# Patient Record
Sex: MALE | Race: WHITE | HISPANIC OR LATINO | Employment: OTHER | URBAN - NONMETROPOLITAN AREA
[De-identification: names, ages, dates, MRNs, and addresses within clinical notes are randomized per-mention and may not be internally consistent; named-entity substitution may affect disease eponyms.]

---

## 2017-03-09 PROBLEM — M19.90 DJD (DEGENERATIVE JOINT DISEASE): Status: ACTIVE | Noted: 2017-03-09

## 2017-03-09 PROBLEM — G47.33 OSA (OBSTRUCTIVE SLEEP APNEA): Status: ACTIVE | Noted: 2017-03-09

## 2017-03-09 PROBLEM — F32.A ANXIETY AND DEPRESSION: Status: ACTIVE | Noted: 2017-03-09

## 2017-03-09 PROBLEM — G47.00 INSOMNIA: Status: ACTIVE | Noted: 2017-03-09

## 2017-03-09 PROBLEM — E78.5 DYSLIPIDEMIA: Status: ACTIVE | Noted: 2017-03-09

## 2017-03-09 PROBLEM — K51.90 ULCERATIVE COLITIS (HCC): Status: ACTIVE | Noted: 2017-03-09

## 2017-03-09 PROBLEM — I25.10 CAD (CORONARY ARTERY DISEASE): Status: ACTIVE | Noted: 2017-03-09

## 2017-03-09 PROBLEM — F41.9 ANXIETY AND DEPRESSION: Status: ACTIVE | Noted: 2017-03-09

## 2017-03-09 PROBLEM — I10 HYPERTENSION: Status: ACTIVE | Noted: 2017-03-09

## 2017-03-09 PROBLEM — R41.3 POOR SHORT TERM MEMORY: Status: ACTIVE | Noted: 2017-03-09

## 2017-03-09 PROBLEM — N40.0 BPH (BENIGN PROSTATIC HYPERPLASIA): Status: ACTIVE | Noted: 2017-03-09

## 2017-10-04 ENCOUNTER — OFFICE VISIT (OUTPATIENT)
Dept: CARDIOLOGY | Facility: CLINIC | Age: 72
End: 2017-10-04

## 2017-10-04 VITALS
HEART RATE: 58 BPM | OXYGEN SATURATION: 100 % | WEIGHT: 195.4 LBS | HEIGHT: 66 IN | DIASTOLIC BLOOD PRESSURE: 69 MMHG | BODY MASS INDEX: 31.4 KG/M2 | SYSTOLIC BLOOD PRESSURE: 125 MMHG

## 2017-10-04 DIAGNOSIS — I25.10 CORONARY ARTERY DISEASE DUE TO CALCIFIED CORONARY LESION: Primary | ICD-10-CM

## 2017-10-04 DIAGNOSIS — R06.02 SHORTNESS OF BREATH: ICD-10-CM

## 2017-10-04 DIAGNOSIS — I25.84 CORONARY ARTERY DISEASE DUE TO CALCIFIED CORONARY LESION: Primary | ICD-10-CM

## 2017-10-04 DIAGNOSIS — E78.5 DYSLIPIDEMIA: ICD-10-CM

## 2017-10-04 DIAGNOSIS — G47.33 OSA (OBSTRUCTIVE SLEEP APNEA): ICD-10-CM

## 2017-10-04 DIAGNOSIS — I10 ESSENTIAL HYPERTENSION: ICD-10-CM

## 2017-10-04 DIAGNOSIS — R41.3 POOR SHORT TERM MEMORY: ICD-10-CM

## 2017-10-04 PROCEDURE — 99214 OFFICE O/P EST MOD 30 MIN: CPT | Performed by: INTERNAL MEDICINE

## 2017-10-04 PROCEDURE — 93000 ELECTROCARDIOGRAM COMPLETE: CPT | Performed by: INTERNAL MEDICINE

## 2017-10-04 RX ORDER — MECLIZINE HYDROCHLORIDE 25 MG/1
25 TABLET ORAL
COMMUNITY
Start: 2017-07-06 | End: 2020-01-28

## 2017-10-04 RX ORDER — ZOLPIDEM TARTRATE 10 MG/1
10 TABLET ORAL NIGHTLY PRN
COMMUNITY
End: 2023-02-07

## 2017-10-04 RX ORDER — METOPROLOL TARTRATE 50 MG/1
TABLET, FILM COATED ORAL NIGHTLY
COMMUNITY
Start: 2017-08-07 | End: 2017-10-04 | Stop reason: DRUGHIGH

## 2017-10-04 RX ORDER — ALPRAZOLAM 1 MG/1
1 TABLET ORAL NIGHTLY
COMMUNITY
Start: 2017-09-05 | End: 2023-02-07

## 2017-10-04 RX ORDER — LISINOPRIL 5 MG/1
10 TABLET ORAL DAILY
COMMUNITY
Start: 2017-09-24 | End: 2020-01-28 | Stop reason: DRUGHIGH

## 2017-10-04 RX ORDER — CLONIDINE HYDROCHLORIDE 0.2 MG/1
0.2 TABLET ORAL NIGHTLY
COMMUNITY
Start: 2017-09-22 | End: 2020-01-28

## 2017-10-04 RX ORDER — ASPIRIN 325 MG
325 TABLET ORAL DAILY
COMMUNITY
End: 2023-02-07 | Stop reason: DRUGHIGH

## 2017-10-04 NOTE — PROGRESS NOTES
Subjective   Keyon Olivas is a 72 y.o. male     Chief Complaint   Patient presents with   • Establish Care   • Coronary Artery Disease   • Hypertension   • Sleep Apnea   • Hyperlipidemia       PROBLEM LIST:     1. Coronary artery disease   1.1 CABG x 3, 05/11/2000;  LIMA to LAD.  Left radial artery to RCA.  Left radial artery as T graft to LIMA/OM2  1.2 cath august 2006, stenting to RCA and OM2  2. Hypertension   3. Dyslipidemia   3.1 intolerant to Crestor 2012, and Lipitor   4. H/o hep. C; followed by Dr. Cash   5. Dyspnea on Exertion     Specialty Problems        Cardiology Problems    CAD (coronary artery disease)        Hypertension                HPI:  Mr. Keyon Olivas is a 72-year-old white male referred from Dr. Isac Olivas's office for assumption of cardiac care.    Mr. Olivas has known coronary artery disease and underwent coronary artery bypass grafting ×3 in the year 2000 after presenting with acute coronary syndrome.  Apparently internal mammary artery and left radial artery were used as conduits.  Several years after bypass grafting Mr. Olivas developed progressive exertional dyspnea.  Apparently this resulted in repeat stress testing and subsequently PTCA.  Several years later, again per patient report, exertional dyspnea resulted in a second round of stenting.    Mr. Olivas was lost to follow-up shortly thereafter.  However, he was seen by Dr. Hylton in 2016.  Apparently, the patient had stress testing which demonstrated no evidence of ischemia and no further evaluation was performed.    Currently, Mr. Olivas describes severe exertional dyspnea.  He states that he tries to take in his garden or plant even a small plant he will have to spend the entire day working and resting to accomplish.  He has no exertional chest discomfort.  He relates no orthopnea or PND.  He has stable lower extremity edema.  His current symptoms mimic those prior to stenting.  Notably, symptoms improved after each percutaneous  intervention.  Mr. Olivas has chronic intermittent vertigo which she relates to benign positional vertigo.  He also has mild instability.  However, he denies claudication, palpitations, presyncope, or syncope.          CURRENT MEDICATION:    Current Outpatient Prescriptions   Medication Sig Dispense Refill   • ALPRAZolam (XANAX) 1 MG tablet Every Night.     • aspirin 325 MG tablet Take 325 mg by mouth Daily.     • CloNIDine (CATAPRES) 0.2 MG tablet Every Night.     • lisinopril (PRINIVIL,ZESTRIL) 5 MG tablet Daily.     • meclizine (ANTIVERT) 25 MG tablet prn     • zolpidem (AMBIEN) 10 MG tablet Take 10 mg by mouth At Night As Needed for Sleep.     • Evolocumab with Infusor 420 MG/3.5ML solution cartridge Inject 1 Cartridge under the skin Every 30 (Thirty) Days. 1 cartridge. 11   • metoprolol tartrate (LOPRESSOR) 25 MG tablet Take 1 tablet by mouth 2 (Two) Times a Day. 60 tablet 11     No current facility-administered medications for this visit.        ALLERGIES:    Crestor [rosuvastatin]; Lescol [fluvastatin sodium]; Lipitor [atorvastatin]; Penicillins; and Niacin and related    PAST MEDICAL HISTORY:    Past Medical History:   Diagnosis Date   • Anxiety and depression 3/9/2017   • BPH (benign prostatic hyperplasia) 3/9/2017   • CAD (coronary artery disease) 3/9/2017   • DJD (degenerative joint disease) 3/9/2017   • Dyslipidemia 3/9/2017   • Hepatitis C     History of hepatitis C; followed by Dr. Cash in [place].   • Hypertension 3/9/2017   • Insomnia 3/9/2017   • DEACON (obstructive sleep apnea) 3/9/2017   • Poor short term memory 3/9/2017   • Ulcerative colitis 3/9/2017       SURGICAL HISTORY:    Past Surgical History:   Procedure Laterality Date   • CARDIAC CATHETERIZATION  05/2000   • COLECTOMY TOTAL      Ulcerative colitis, status post total colectomy with ileostomy.    • CORONARY ARTERY BYPASS GRAFT  05/11/2000    x3   • CORONARY STENT PLACEMENT  08/2006       SOCIAL HISTORY:    Social History     Social History  "  • Marital status:      Spouse name: N/A   • Number of children: N/A   • Years of education: N/A     Occupational History   • Not on file.     Social History Main Topics   • Smoking status: Former Smoker     Types: Cigarettes     Quit date: 1/1/1983   • Smokeless tobacco: Former User     Types: Chew      Comment: smoked 10-20 years, < last 1 PPD   • Alcohol use No   • Drug use: No   • Sexual activity: Not on file     Other Topics Concern   • Not on file     Social History Narrative       FAMILY HISTORY:    Family History   Problem Relation Age of Onset   • Emphysema Mother    • Heart failure Mother    • Heart attack Father    • Memory loss Brother        Review of Systems   Constitutional: Positive for fatigue. Negative for chills, diaphoresis and fever.   HENT: Positive for voice change.    Eyes: Positive for visual disturbance (reading glasses).   Respiratory: Positive for shortness of breath. Negative for cough, choking, chest tightness, wheezing and stridor.    Cardiovascular: Negative.    Gastrointestinal: Negative.  Negative for abdominal pain, blood in stool, constipation, diarrhea, nausea and vomiting.   Endocrine: Negative.  Negative for cold intolerance and heat intolerance.   Genitourinary: Negative.    Musculoskeletal: Positive for arthralgias and myalgias.   Skin: Negative.  Negative for color change, pallor, rash and wound.   Allergic/Immunologic: Positive for environmental allergies.   Neurological: Positive for dizziness (vertigo). Negative for tremors, seizures, syncope, facial asymmetry, speech difficulty, weakness, light-headedness, numbness and headaches.        HX vertigo   Hematological: Bruises/bleeds easily.   Psychiatric/Behavioral: Positive for sleep disturbance.       VISIT VITALS:    /69 (BP Location: Left arm, Patient Position: Sitting)  Pulse 58  Ht 66\" (167.6 cm)  Wt 195 lb 6.4 oz (88.6 kg)  SpO2 100%  BMI 31.54 kg/m2    RECENT LABS:    Objective       Physical Exam "   Constitutional: He is oriented to person, place, and time. He appears well-developed and well-nourished. No distress.   HENT:   Head: Normocephalic and atraumatic.   Mouth/Throat: Uvula is midline. No oral lesions.   Eyes: Conjunctivae, EOM and lids are normal. Pupils are equal, round, and reactive to light. Lids are everted and swept, no foreign bodies found.   Negative ptosis   Negative lid lag   Negative arcus senilis     Neck: Normal range of motion. Neck supple. Normal carotid pulses, no hepatojugular reflux and no JVD present. Carotid bruit is not present. No tracheal deviation present. No thyroid mass and no thyromegaly present.   Cardiovascular: Normal rate, regular rhythm, S1 normal, S2 normal, normal heart sounds and intact distal pulses.  Exam reveals no gallop, no S3, no S4, no distant heart sounds and no friction rub.    No murmur heard.  Pulses:       Radial pulses are 2+ on the right side, and 2+ on the left side.        Dorsalis pedis pulses are 2+ on the right side, and 2+ on the left side.        Posterior tibial pulses are 2+ on the right side, and 2+ on the left side.   Pulmonary/Chest: Effort normal and breath sounds normal. No respiratory distress. He has no decreased breath sounds. He has no wheezes. He has no rhonchi. He has no rales. He exhibits no tenderness.   Abdominal: Soft. Bowel sounds are normal. He exhibits no distension, no abdominal bruit and no mass. There is no tenderness. There is no rebound and no guarding.   Negative organomegaly      Musculoskeletal: Normal range of motion. He exhibits edema (trace edema BLE). He exhibits no tenderness or deformity.   Lymphadenopathy:     He has no cervical adenopathy.     He has no axillary adenopathy.   Neurological: He is alert and oriented to person, place, and time.   Skin: Skin is warm and dry. No rash noted. No erythema. No pallor.   Psychiatric: He has a normal mood and affect. His speech is normal and behavior is normal. Judgment  and thought content normal. Cognition and memory are normal.   Nursing note and vitals reviewed.        ECG 12 Lead  Date/Time: 10/4/2017 2:51 PM  Performed by: SONIYA GRACE  Authorized by: SONIYA GRACE   Rhythm: sinus rhythm  Rate: bradycardic  QRS axis: normal  Clinical impression: abnormal ECG              Assessment/Plan    #1.  Mr. Olivas has known coronary artery disease and has had progressive and severe exertional dyspnea (no pericardial association class 3-4 symptomatology).  His current symptoms are similar to those which prompted stenting on 2 separate occasions in the past.  Therefore, I think that further risk stratification is indicated.    #2.  We will perform pharmacologic stress testing as an ischemia assessment as I don't believe the patient would be able to walk adequately on a treadmill.    #3.  I would like to obtain an echocardiogram to assess LV systolic and diastolic, LV filling pressures, pulmonary pressures, and a look for restrictive or constrictive hemodynamics as the patient states that shortness of breath initially began approximately one year after bypass.    #4.  The patient is significantly bradycardic today.  He takes metoprolol tartrate 50 mg in the morning.  He complains of labile hypertension.  Therefore, I would like to change metoprolol dosing to 25 mg twice daily and follow heart rate and blood pressure closely.  We may need to consider event monitoring at sometime in the future to ensure that chronotropic incompetence is not a contributing factor to the patient's poor exercise capacity.    #5.  Mr. Olivas is had significant side effects from multiple statin medications.  In the setting of known coronary artery disease I believe he is a candidate for PCSK-9 inhibitor therapy.  We will begin the same.    #6.  Mr. Olivas will follow-up with Dr. Olivas as instructed, and in our office after testing or on a when necessary basis for symptoms as discussed today.                Diagnosis Plan   1. Coronary artery disease due to calcified coronary lesion  ECG 12 Lead    Evolocumab with Infusor 420 MG/3.5ML solution cartridge    Lipid Panel    Comprehensive Metabolic Panel    Stress Test With Myocardial Perfusion One Day    Adult Transthoracic Echo Complete W/ Cont if Necessary Per Protocol    Lipid Panel    Comprehensive Metabolic Panel    Stress Test With Myocardial Perfusion One Day    Adult Transthoracic Echo Complete W/ Cont if Necessary Per Protocol   2. Essential hypertension  ECG 12 Lead    Lipid Panel    Comprehensive Metabolic Panel    Stress Test With Myocardial Perfusion One Day    Adult Transthoracic Echo Complete W/ Cont if Necessary Per Protocol    Lipid Panel    Comprehensive Metabolic Panel    Stress Test With Myocardial Perfusion One Day    Adult Transthoracic Echo Complete W/ Cont if Necessary Per Protocol   3. Shortness of breath  ECG 12 Lead    Lipid Panel    Comprehensive Metabolic Panel    Stress Test With Myocardial Perfusion One Day    Adult Transthoracic Echo Complete W/ Cont if Necessary Per Protocol    Lipid Panel    Comprehensive Metabolic Panel    Stress Test With Myocardial Perfusion One Day    Adult Transthoracic Echo Complete W/ Cont if Necessary Per Protocol   4. Poor short term memory     5. Dyslipidemia  Evolocumab with Infusor 420 MG/3.5ML solution cartridge    Lipid Panel    Comprehensive Metabolic Panel    Stress Test With Myocardial Perfusion One Day    Adult Transthoracic Echo Complete W/ Cont if Necessary Per Protocol    Lipid Panel    Comprehensive Metabolic Panel    Stress Test With Myocardial Perfusion One Day    Adult Transthoracic Echo Complete W/ Cont if Necessary Per Protocol   6. DEACON (obstructive sleep apnea)         Return for f/u after testing .         Juan C Samaniego MD   Scribed for Dr. Juan C Samaniego by DAXA Lou October 4, 2017 4:18 PM

## 2017-11-01 ENCOUNTER — HOSPITAL ENCOUNTER (OUTPATIENT)
Dept: CARDIOLOGY | Facility: HOSPITAL | Age: 72
Discharge: HOME | End: 2017-11-01
Attending: INTERNAL MEDICINE

## 2017-11-01 ENCOUNTER — OUTSIDE FACILITY SERVICE (OUTPATIENT)
Dept: CARDIOLOGY | Facility: CLINIC | Age: 72
End: 2017-11-01

## 2017-11-01 LAB
MAXIMAL PREDICTED HEART RATE: 150 BPM
MAXIMAL PREDICTED HEART RATE: 150 BPM
STRESS TARGET HR: 128 BPM
STRESS TARGET HR: 128 BPM

## 2017-11-01 PROCEDURE — A9500 TC99M SESTAMIBI: HCPCS | Performed by: INTERNAL MEDICINE

## 2017-11-01 PROCEDURE — 25010000002 REGADENOSON 0.4 MG/5ML SOLUTION: Performed by: INTERNAL MEDICINE

## 2017-11-01 PROCEDURE — 78452 HT MUSCLE IMAGE SPECT MULT: CPT

## 2017-11-01 PROCEDURE — 93018 CV STRESS TEST I&R ONLY: CPT | Performed by: INTERNAL MEDICINE

## 2017-11-01 PROCEDURE — 93306 TTE W/DOPPLER COMPLETE: CPT | Performed by: INTERNAL MEDICINE

## 2017-11-01 PROCEDURE — 0 TECHNETIUM SESTAMIBI: Performed by: INTERNAL MEDICINE

## 2017-11-01 PROCEDURE — 93306 TTE W/DOPPLER COMPLETE: CPT

## 2017-11-01 PROCEDURE — 93017 CV STRESS TEST TRACING ONLY: CPT

## 2017-11-01 PROCEDURE — 78452 HT MUSCLE IMAGE SPECT MULT: CPT | Performed by: INTERNAL MEDICINE

## 2017-11-01 RX ADMIN — TECHNETIUM TC-99M SESTAMIBI 1 DOSE: 1 INJECTION INTRAVENOUS at 13:15

## 2017-11-01 RX ADMIN — REGADENOSON 0.4 MG: 0.08 INJECTION, SOLUTION INTRAVENOUS at 13:15

## 2017-11-08 ENCOUNTER — TELEPHONE (OUTPATIENT)
Dept: CARDIOLOGY | Facility: CLINIC | Age: 72
End: 2017-11-08

## 2017-11-08 NOTE — TELEPHONE ENCOUNTER
PATIENT AWARE OF ECHO AND STRESS TEST RESULTS.  MEI PERDOMO SCHEDULING FOLLOW UP APPT. CHACHA,MICHELLE

## 2017-11-08 NOTE — TELEPHONE ENCOUNTER
----- Message from Jose Montano sent at 11/8/2017  9:49 AM EST -----  Contact: DEVORA LOZOYA RETURNED YOUR CALL FOR HIS TEST RESULTS.

## 2018-01-08 ENCOUNTER — OFFICE VISIT (OUTPATIENT)
Dept: CARDIOLOGY | Facility: CLINIC | Age: 73
End: 2018-01-08

## 2018-01-08 VITALS
WEIGHT: 192.2 LBS | HEIGHT: 66 IN | SYSTOLIC BLOOD PRESSURE: 134 MMHG | BODY MASS INDEX: 30.89 KG/M2 | DIASTOLIC BLOOD PRESSURE: 71 MMHG | OXYGEN SATURATION: 98 % | HEART RATE: 65 BPM

## 2018-01-08 DIAGNOSIS — G47.33 OSA (OBSTRUCTIVE SLEEP APNEA): ICD-10-CM

## 2018-01-08 DIAGNOSIS — E78.5 DYSLIPIDEMIA: ICD-10-CM

## 2018-01-08 DIAGNOSIS — I10 ESSENTIAL HYPERTENSION: ICD-10-CM

## 2018-01-08 DIAGNOSIS — I25.810 CORONARY ARTERY DISEASE INVOLVING CORONARY BYPASS GRAFT OF NATIVE HEART WITHOUT ANGINA PECTORIS: Primary | ICD-10-CM

## 2018-01-08 PROCEDURE — 99213 OFFICE O/P EST LOW 20 MIN: CPT | Performed by: NURSE PRACTITIONER

## 2018-01-08 RX ORDER — NITROGLYCERIN 0.4 MG/1
0.4 TABLET SUBLINGUAL
COMMUNITY
End: 2018-01-08 | Stop reason: SDUPTHER

## 2018-01-08 RX ORDER — NITROGLYCERIN 0.4 MG/1
0.4 TABLET SUBLINGUAL
Qty: 35 TABLET | Refills: 5 | Status: SHIPPED | OUTPATIENT
Start: 2018-01-08 | End: 2020-01-28 | Stop reason: SDUPTHER

## 2018-01-08 NOTE — PROGRESS NOTES
Subjective   Keyon Olivas is a 72 y.o. male     Chief Complaint   Patient presents with   • Coronary Artery Disease     presents as a follow up   • Results     presents for testing results        HPI    PROBLEM LIST:     1. Coronary artery disease   1.1 CABG x 3, 05/11/2000;  LIMA to LAD.  Left radial artery to RCA.  Left radial artery as T graft to LIMA/OM2  1.2 cath august 2006, stenting to RCA and OM2  1.3 Stress test 11/1/17-no ischemia, preserved LVEF  2. Hypertension   3. Dyslipidemia   3.1 intolerant to Crestor 2012, and Lipitor   4. H/o hep. C; followed by Dr. Cash   5. Dyspnea on Exertion   5.1 Echo 11/1/17-EF greater than 65%, diastolic dysfunction 1, mild MR, mild TR, IL, PA 30-35    Patient is a 72-year-old male who presents today for follow-up on testing.  He denies any chest pain, pressure, palpitations, fluttering, presyncope, syncope, orthopnea, PND or edema.  He denies any shortness of breath with activity.  He says he does have a history of vertigo has not had episode in a while.  He says he has for a few weeks now had numbness tingling in his arms and his legs.  He will follow-up with PCP regarding this.  PCP monitors his cholesterol.    Current Outpatient Prescriptions   Medication Sig Dispense Refill   • ALPRAZolam (XANAX) 1 MG tablet Every Night.     • aspirin 325 MG tablet Take 325 mg by mouth Daily.     • CloNIDine (CATAPRES) 0.2 MG tablet Every Night.     • Evolocumab with Infusor 420 MG/3.5ML solution cartridge Inject 1 Cartridge under the skin Every 30 (Thirty) Days. 1 cartridge. 11   • meclizine (ANTIVERT) 25 MG tablet prn     • metoprolol tartrate (LOPRESSOR) 25 MG tablet Take 1 tablet by mouth 2 (Two) Times a Day. (Patient taking differently: Take 25 mg by mouth Daily.) 60 tablet 11   • nitroglycerin (NITROSTAT) 0.4 MG SL tablet Place 1 tablet under the tongue Every 5 (Five) Minutes As Needed for Chest Pain. Take no more than 3 doses in 15 minutes. 35 tablet 5   • zolpidem (AMBIEN) 10  MG tablet Take 10 mg by mouth At Night As Needed for Sleep.     • lisinopril (PRINIVIL,ZESTRIL) 5 MG tablet Take 2.5 mg by mouth Daily.       No current facility-administered medications for this visit.        ALLERGIES    Crestor [rosuvastatin]; Lescol [fluvastatin sodium]; Lipitor [atorvastatin]; Penicillins; and Niacin and related    Past Medical History:   Diagnosis Date   • Anxiety and depression 3/9/2017   • BPH (benign prostatic hyperplasia) 3/9/2017   • CAD (coronary artery disease) 3/9/2017   • DJD (degenerative joint disease) 3/9/2017   • Dyslipidemia 3/9/2017   • Hepatitis C     History of hepatitis C; followed by Dr. Cash in [place].   • Hypertension 3/9/2017   • Insomnia 3/9/2017   • DEACON (obstructive sleep apnea) 3/9/2017   • Poor short term memory 3/9/2017   • Ulcerative colitis 3/9/2017       Social History     Social History   • Marital status:      Spouse name: N/A   • Number of children: N/A   • Years of education: N/A     Occupational History   • Not on file.     Social History Main Topics   • Smoking status: Former Smoker     Types: Cigarettes     Quit date: 1/1/1983   • Smokeless tobacco: Former User     Types: Chew      Comment: smoked 10-20 years, < last 1 PPD   • Alcohol use No   • Drug use: No   • Sexual activity: Not on file     Other Topics Concern   • Not on file     Social History Narrative       Family History   Problem Relation Age of Onset   • Emphysema Mother    • Heart failure Mother    • Heart attack Father    • Memory loss Brother        Review of Systems   Constitutional: Negative for diaphoresis and fatigue.   HENT: Positive for rhinorrhea. Negative for sinus pressure and sneezing.         Dried out nose at night    Eyes: Negative for visual disturbance.   Respiratory: Negative for chest tightness and shortness of breath.    Cardiovascular: Negative for chest pain, palpitations and leg swelling.   Gastrointestinal: Negative for constipation, diarrhea, nausea and  "vomiting.   Endocrine: Negative.    Genitourinary: Negative for difficulty urinating.   Musculoskeletal: Positive for arthralgias, back pain and gait problem (off balance ). Negative for myalgias and neck pain.   Skin: Negative.    Allergic/Immunologic: Positive for environmental allergies.   Neurological: Positive for dizziness (H/O vertigo ) and numbness (arms, hands, legs and feet tingles started a couple of weeks ago  ). Negative for syncope, light-headedness and headaches.   Hematological: Does not bruise/bleed easily.   Psychiatric/Behavioral: The patient is not nervous/anxious.        Objective   /71 (BP Location: Left arm, Patient Position: Sitting)  Pulse 65  Ht 167.6 cm (66\")  Wt 87.2 kg (192 lb 3.2 oz)  SpO2 98%  BMI 31.02 kg/m2  Vitals:    01/08/18 1512   BP: 134/71   BP Location: Left arm   Patient Position: Sitting   Pulse: 65   SpO2: 98%   Weight: 87.2 kg (192 lb 3.2 oz)   Height: 167.6 cm (66\")      Lab Results (most recent)     None        Physical Exam   Constitutional: He is oriented to person, place, and time. Vital signs are normal. He appears well-developed and well-nourished. He is active and cooperative.   HENT:   Head: Normocephalic.   Eyes: Lids are normal.   Neck: Normal carotid pulses, no hepatojugular reflux and no JVD present. Carotid bruit is not present.   Cardiovascular: Normal rate, regular rhythm and normal heart sounds.    Pulses:       Radial pulses are 2+ on the left side.        Dorsalis pedis pulses are 2+ on the right side, and 2+ on the left side.        Posterior tibial pulses are 2+ on the right side, and 2+ on the left side.   No edema BLE.    Pulmonary/Chest: Effort normal and breath sounds normal.   Abdominal: Normal appearance and bowel sounds are normal.   Neurological: He is alert and oriented to person, place, and time.   Skin: Skin is warm, dry and intact.   Psychiatric: He has a normal mood and affect. His speech is normal and behavior is normal. " Judgment and thought content normal. Cognition and memory are normal.       Procedure   Procedures         Assessment/Plan      Diagnosis Plan   1. Coronary artery disease involving coronary bypass graft of native heart without angina pectoris  nitroglycerin (NITROSTAT) 0.4 MG SL tablet   2. Essential hypertension     3. Dyslipidemia     4. DEACON (obstructive sleep apnea)         Return in about 3 months (around 4/8/2018).       CAD-patient is on aspirin, Repatha and beta.  Hypertension-patient doing well.  Dyslipidemia-patient is a Repatha.  DEACON-patient is following up with Dr. Worthington.  He will continue his medication regimen.  He'll follow-up in 3 months or sooner if any changes.    Electronically signed by:

## 2018-01-08 NOTE — PATIENT INSTRUCTIONS
Coronary Artery Disease, Male  Coronary artery disease (CAD) is a process in which the blood vessels of the heart (coronary arteries) become narrow or blocked. The narrowing or blockage can lead to decreased blood flow to the heart muscle (angina). Prolonged reduced blood flow can cause a heart attack (myocardial infarction, MI). Because CAD is the leading cause of death in men, it is important to understand what causes this condition and how it is treated.  CAUSES  Atherosclerosis is the cause of CAD. This is the buildup of fat and cholesterol (plaque) on the inside of the arteries. Over time, the plaque may narrow or block the artery, and this will lessen blood flow to the heart. Plaque can also become weak and break off within a coronary artery to form a clot and cause a sudden blockage.  RISK FACTORS  Many risk factors increase your chances of getting CAD, including:  · High cholesterol levels.  · High blood pressure (hypertension).  · Tobacco use.  · Diabetes.  · Age. Men over age 45 are at a greater risk of CAD.  · Gender. Men often develop CAD earlier in life than women.  · Family history of CAD.  · Obesity.  · Lack of exercise.  · A diet high in saturated fats.  SYMPTOMS   Many people do not experience any symptoms during the early stages of CAD. As the condition progresses, symptoms may include:   · Chest pain.  ¨ The pain can be described as a crushing or squeezing in the chest, or a tightness, pressure, fullness, or heaviness in the chest.  ¨ The pain can last more than a few minutes or can stop and recur.   · Pain in the arms, neck, jaw, or back.  · Unexplained heartburn or indigestion.  · Shortness of breath.  · Nausea.  · Sudden cold sweats.   Less common symptoms of CAD in men can include:  · Fatigue.  · Unexplained feelings of nervousness or anxiety.  · Weakness.  · Diarrhea.  · Sudden light-headedness.  DIAGNOSIS   Tests to diagnose CAD may include:  · ECG (electrocardiogram).  · Exercise stress  test. This looks for signs of blockage when the heart is being exercised.  · Pharmacologic stress test. This test looks for signs of blockage when the heart is being stressed with a medicine.  · Blood tests.  · Coronary angiogram. This is a procedure to look at the coronary arteries to see if there is any blockage.  TREATMENT  The treatment of CAD may include the following:  · Healthy behavioral changes to reduce or control risk factors.  · Medicine.  · Coronary stenting. A stent helps to keep an artery open.  · Coronary angioplasty. This procedure widens a narrowed or blocked artery.  · Coronary artery bypass surgery. This will allow your blood to pass the blockage (bypass) to reach your heart.  HOME CARE INSTRUCTIONS  · Take medicines only as directed by your health care provider.  · Do not take the following medicines unless your health care provider approves:    Nonsteroidal anti-inflammatory drugs (NSAIDs), such as ibuprofen, naproxen, or celecoxib.    Vitamin supplements that contain vitamin A, vitamin E, or both.  · Manage other health conditions such as hypertension and diabetes as directed by your health care provider.  · Follow a heart-healthy diet. A dietitian can help to educate you about healthy food options and changes.  · Use healthy cooking methods such as roasting, grilling, broiling, baking, poaching, steaming, or stir-frying. Talk to a dietitian to learn more about healthy cooking methods.  · Follow an exercise program approved by your health care provider.  · Maintain a healthy weight. Lose weight as approved by your health care provider.  · Plan rest periods when fatigued.  · Learn to manage stress.  · Do not use any tobacco products, including cigarettes, chewing tobacco, or electronic cigarettes. If you need help quitting, ask your health care provider.  · If you drink alcohol, and your health care provider approves, limit your alcohol intake to no more than 1 drink per day. One drink equals  12 ounces of beer, 5 ounces of wine, or 1½ ounces of hard liquor.  · Stop illegal drug use.  · Your health care provider may ask you to monitor your blood pressure. A blood pressure reading consists of a higher number over a lower number, such as 110 over 72, which is written as 110/72. Ideally, your blood pressure should be:    Below 140/90 if you have no other medical conditions.    Below 130/80 if you have diabetes or kidney disease.  · Keep all follow-up visits as directed by your health care provider. This is important.  SEEK IMMEDIATE MEDICAL CARE IF:  · You have pain in your chest, neck, arm, jaw, stomach, or back that lasts more than a few minutes, is recurring, or is unrelieved by taking medicine under your tongue (sublingual nitroglycerin).  · You have profuse sweating without cause.  · You have unexplained:    Heartburn or indigestion.    Shortness of breath or difficulty breathing.    Nausea or vomiting.    Fatigue.    Feelings of nervousness or anxiety.    Weakness.    Diarrhea.  · You have sudden light-headedness or dizziness.  · You faint.  These symptoms may represent a serious problem that is an emergency. Do not wait to see if the symptoms will go away. Get medical help right away. Call your local emergency services (911 in the [place] Do not drive yourself to the hospital.     This information is not intended to replace advice given to you by your health care provider. Make sure you discuss any questions you have with your health care provider.     Document Released: 07/15/2015 Document Reviewed: 07/15/2015  [email or web address] Interactive Patient Education ©2017 [email or web address]

## 2018-04-20 ENCOUNTER — TRANSCRIBE ORDERS (OUTPATIENT)
Dept: LAB | Facility: HOSPITAL | Age: 73
End: 2018-04-20

## 2018-04-20 ENCOUNTER — LAB (OUTPATIENT)
Dept: LAB | Facility: HOSPITAL | Age: 73
End: 2018-04-20

## 2018-04-20 DIAGNOSIS — R20.2 PARESTHESIA: Primary | ICD-10-CM

## 2018-04-20 DIAGNOSIS — R53.83 FATIGUE, UNSPECIFIED TYPE: ICD-10-CM

## 2018-04-20 DIAGNOSIS — E55.9 VITAMIN D DEFICIENCY DISEASE: ICD-10-CM

## 2018-04-20 DIAGNOSIS — R20.2 PARESTHESIA: ICD-10-CM

## 2018-04-20 LAB
25(OH)D3 SERPL-MCNC: 28.2 NG/ML
ALBUMIN SERPL-MCNC: 4.6 G/DL (ref 3.2–4.8)
ALBUMIN/GLOB SERPL: 1.5 G/DL (ref 1.5–2.5)
ALP SERPL-CCNC: 71 U/L (ref 25–100)
ALT SERPL W P-5'-P-CCNC: 31 U/L (ref 7–40)
ANION GAP SERPL CALCULATED.3IONS-SCNC: 8 MMOL/L (ref 3–11)
ARTICHOKE IGE QN: 129 MG/DL (ref 0–130)
AST SERPL-CCNC: 27 U/L (ref 0–33)
BILIRUB SERPL-MCNC: 1 MG/DL (ref 0.3–1.2)
BUN BLD-MCNC: 23 MG/DL (ref 9–23)
BUN/CREAT SERPL: 19.2 (ref 7–25)
CALCIUM SPEC-SCNC: 10.2 MG/DL (ref 8.7–10.4)
CHLORIDE SERPL-SCNC: 102 MMOL/L (ref 99–109)
CHOLEST SERPL-MCNC: 176 MG/DL (ref 0–200)
CO2 SERPL-SCNC: 27 MMOL/L (ref 20–31)
CORTIS SERPL-MCNC: 3.6 MCG/DL
CREAT BLD-MCNC: 1.2 MG/DL (ref 0.6–1.3)
FOLATE SERPL-MCNC: 16.64 NG/ML (ref 3.2–20)
GFR SERPL CREATININE-BSD FRML MDRD: 60 ML/MIN/1.73
GLOBULIN UR ELPH-MCNC: 3 GM/DL
GLUCOSE BLD-MCNC: 95 MG/DL (ref 70–100)
HDLC SERPL-MCNC: 42 MG/DL (ref 40–60)
POTASSIUM BLD-SCNC: 4.1 MMOL/L (ref 3.5–5.5)
PROT SERPL-MCNC: 7.6 G/DL (ref 5.7–8.2)
SODIUM BLD-SCNC: 137 MMOL/L (ref 132–146)
T4 FREE SERPL-MCNC: 1.44 NG/DL (ref 0.89–1.76)
TRIGL SERPL-MCNC: 94 MG/DL (ref 0–150)
VIT B12 BLD-MCNC: 586 PG/ML (ref 211–911)

## 2018-04-20 PROCEDURE — 82306 VITAMIN D 25 HYDROXY: CPT

## 2018-04-20 PROCEDURE — 80061 LIPID PANEL: CPT | Performed by: INTERNAL MEDICINE

## 2018-04-20 PROCEDURE — 82533 TOTAL CORTISOL: CPT

## 2018-04-20 PROCEDURE — 80053 COMPREHEN METABOLIC PANEL: CPT | Performed by: INTERNAL MEDICINE

## 2018-04-20 PROCEDURE — 84439 ASSAY OF FREE THYROXINE: CPT

## 2018-04-20 PROCEDURE — 36415 COLL VENOUS BLD VENIPUNCTURE: CPT | Performed by: INTERNAL MEDICINE

## 2018-04-20 PROCEDURE — 82607 VITAMIN B-12: CPT

## 2018-04-20 PROCEDURE — 82746 ASSAY OF FOLIC ACID SERUM: CPT

## 2018-05-07 ENCOUNTER — TRANSCRIBE ORDERS (OUTPATIENT)
Dept: LAB | Facility: HOSPITAL | Age: 73
End: 2018-05-07

## 2018-05-07 ENCOUNTER — LAB (OUTPATIENT)
Dept: LAB | Facility: HOSPITAL | Age: 73
End: 2018-05-07

## 2018-05-07 DIAGNOSIS — R53.83 FATIGUE DUE TO DEPRESSION: ICD-10-CM

## 2018-05-07 DIAGNOSIS — F32.A FATIGUE DUE TO DEPRESSION: ICD-10-CM

## 2018-05-07 DIAGNOSIS — E55.9 VITAMIN D DEFICIENCY: ICD-10-CM

## 2018-05-07 DIAGNOSIS — R20.2 PARESTHESIA: ICD-10-CM

## 2018-05-07 DIAGNOSIS — R20.2 PARESTHESIA: Primary | ICD-10-CM

## 2018-05-07 LAB — CORTIS SERPL-MCNC: 18 MCG/DL

## 2018-05-07 PROCEDURE — 82533 TOTAL CORTISOL: CPT | Performed by: INTERNAL MEDICINE

## 2018-05-07 PROCEDURE — 36415 COLL VENOUS BLD VENIPUNCTURE: CPT

## 2019-06-27 ENCOUNTER — OFFICE VISIT (OUTPATIENT)
Dept: NEUROSURGERY | Facility: CLINIC | Age: 74
End: 2019-06-27

## 2019-06-27 VITALS
OXYGEN SATURATION: 99 % | BODY MASS INDEX: 30.02 KG/M2 | DIASTOLIC BLOOD PRESSURE: 58 MMHG | HEART RATE: 53 BPM | WEIGHT: 186 LBS | SYSTOLIC BLOOD PRESSURE: 110 MMHG | TEMPERATURE: 97.1 F | RESPIRATION RATE: 18 BRPM

## 2019-06-27 DIAGNOSIS — M48.02 CERVICAL STENOSIS OF SPINAL CANAL: Primary | ICD-10-CM

## 2019-06-27 DIAGNOSIS — M50.30 BULGING OF CERVICAL INTERVERTEBRAL DISC: ICD-10-CM

## 2019-06-27 PROCEDURE — 99203 OFFICE O/P NEW LOW 30 MIN: CPT | Performed by: NEUROLOGICAL SURGERY

## 2019-06-27 NOTE — PROGRESS NOTES
Subjective   Patient ID: Keyon Olivas is a 73 y.o. male is being seen for consultation today at the request of Osman Olivas MD  Chief Complaint: Numbness, tingling, paresthesia of feet and hands    History of Present Illness: The patient is a 73-year-old man who retired from [employer] in [place] about 16 years ago.  He has a complaint of numbness, paresthesia, and pain that affects his feet and if it continues long enough affect his hands as well.  He knows it particularly when he reclines in the evening at rest, and it may evolve very quickly.  He has had no gait disturbance.  He gets some crepitus of his neck.  He had an EMG/NCV by Dr. Grant which found no abnormalities.  He was seeing a neurologist who left [place] before his treatment was completed.  MRI of the cervical spine was done and cervical stenosis was reported and he is here to see if this needs to be treated.  He has tried gabapentin in the past but has adverse response and stopped taking it.    Review of Radiographic Studies:  Cervical MRI scan dated 4/26/2019 shows minimal stenosis at C5-6 and C6-7.  The C5-6 level shows contact with the spinal cord both anteriorly and posteriorly of the posterior disc space and the ligamentum flavum, but there is no distortion, flattening, or other evidence of significant pressure on the spinal cord.    The following portions of the patient's history were reviewed, updated as appropriate and approved: allergies, current medications, past family history, past medical history, past social history, past surgical history, review of systems and problem list.  Review of Systems   Constitutional: Positive for fatigue. Negative for activity change, appetite change, chills, diaphoresis, fever and unexpected weight change.   HENT: Negative for congestion, dental problem, drooling, ear discharge, ear pain, facial swelling, hearing loss, mouth sores, nosebleeds, postnasal drip, rhinorrhea, sinus pressure,  sneezing, sore throat, tinnitus, trouble swallowing and voice change.    Eyes: Negative for photophobia, pain, discharge, redness, itching and visual disturbance.   Respiratory: Negative for apnea, cough, choking, chest tightness, shortness of breath, wheezing and stridor.    Cardiovascular: Negative for chest pain, palpitations and leg swelling.   Gastrointestinal: Negative for abdominal distention, abdominal pain, anal bleeding, blood in stool, constipation, diarrhea, nausea, rectal pain and vomiting.   Endocrine: Negative for cold intolerance, heat intolerance, polydipsia, polyphagia and polyuria.   Genitourinary: Negative for decreased urine volume, difficulty urinating, dysuria, enuresis, flank pain, frequency, genital sores, hematuria and urgency.   Musculoskeletal: Positive for neck pain. Negative for arthralgias, back pain, gait problem, joint swelling, myalgias and neck stiffness.   Skin: Negative for color change, pallor, rash and wound.   Allergic/Immunologic: Negative for environmental allergies, food allergies and immunocompromised state.   Neurological: Positive for dizziness, weakness and light-headedness. Negative for tremors, seizures, syncope, facial asymmetry, speech difficulty, numbness and headaches.   Hematological: Negative for adenopathy. Does not bruise/bleed easily.   Psychiatric/Behavioral: Negative for agitation, behavioral problems, confusion, decreased concentration, dysphoric mood, hallucinations, self-injury, sleep disturbance and suicidal ideas. The patient is not nervous/anxious and is not hyperactive.        Objective     NEUROLOGICAL EXAMINATION:      MENTAL STATUS:  Alert and oriented.  Speech intact.  Recent and remote memory intact.      CRANIAL NERVES:  Cranial nerve II:  Visual fields are full.  Cranial nerves III, IV and VI:  PERRLADC.  Extraocular movements are intact.  Nystagmus is not present.  Cranial nerve V:  Facial sensation is intact.  Cranial nerve VII:  Muscles of  facial expression reveal no asymmetry.  Cranial nerve VIII:  Hearing is intact.  Cranial nerves IX and X:  Palate elevates symmetrically.  Cranial nerve XI:  Shoulder shrug is intact.  Cranial nerve XII:  Tongue is midline without evidence of atrophy or fasciculation.    MUSCULOSKELETAL: Crepitus with cervical range of motion subjectively.    MOTOR: No weakness of upper or lower extremities.  No spasticity.    SENSATION: Sensory subjective alterations of the feet as noted in present illness.    REFLEXES:  DTR normal 2+ upper and lower extremities.    Assessment   Mild stenosis C5-6, insufficient anatomically to be the cause of a myelopathy, and the paresthesias/dysesthesia of his feet has more the character of peripheral neuropathy rather than myelopathy.       Plan   I do not recommend surgical decompression of the cervical spine.       Evert Bland MD

## 2019-11-11 NOTE — TELEPHONE ENCOUNTER
Refill request for Metoprolol denied. Patient was given a 2 week supply on 10/28/19 and still did not schedule appt. Last seen 1/2018.  , DAXA

## 2020-01-28 ENCOUNTER — OFFICE VISIT (OUTPATIENT)
Dept: CARDIOLOGY | Facility: CLINIC | Age: 75
End: 2020-01-28

## 2020-01-28 VITALS
HEIGHT: 66 IN | WEIGHT: 188.8 LBS | DIASTOLIC BLOOD PRESSURE: 85 MMHG | OXYGEN SATURATION: 98 % | HEART RATE: 68 BPM | SYSTOLIC BLOOD PRESSURE: 175 MMHG | BODY MASS INDEX: 30.34 KG/M2

## 2020-01-28 DIAGNOSIS — R53.83 MALAISE AND FATIGUE: ICD-10-CM

## 2020-01-28 DIAGNOSIS — R53.81 MALAISE AND FATIGUE: ICD-10-CM

## 2020-01-28 DIAGNOSIS — R07.89 OTHER CHEST PAIN: Primary | ICD-10-CM

## 2020-01-28 DIAGNOSIS — I25.810 CORONARY ARTERY DISEASE INVOLVING CORONARY BYPASS GRAFT OF NATIVE HEART WITHOUT ANGINA PECTORIS: ICD-10-CM

## 2020-01-28 DIAGNOSIS — I10 ESSENTIAL HYPERTENSION: ICD-10-CM

## 2020-01-28 DIAGNOSIS — G47.33 OSA (OBSTRUCTIVE SLEEP APNEA): ICD-10-CM

## 2020-01-28 DIAGNOSIS — R06.02 SHORTNESS OF BREATH: ICD-10-CM

## 2020-01-28 DIAGNOSIS — Z00.00 HEALTHCARE MAINTENANCE: ICD-10-CM

## 2020-01-28 DIAGNOSIS — E78.5 DYSLIPIDEMIA: ICD-10-CM

## 2020-01-28 DIAGNOSIS — I49.1 PREMATURE ATRIAL CONTRACTION: ICD-10-CM

## 2020-01-28 PROCEDURE — 93000 ELECTROCARDIOGRAM COMPLETE: CPT | Performed by: NURSE PRACTITIONER

## 2020-01-28 PROCEDURE — 99214 OFFICE O/P EST MOD 30 MIN: CPT | Performed by: NURSE PRACTITIONER

## 2020-01-28 RX ORDER — NITROGLYCERIN 0.4 MG/1
0.4 TABLET SUBLINGUAL
Qty: 35 TABLET | Refills: 5 | Status: SHIPPED | OUTPATIENT
Start: 2020-01-28 | End: 2022-06-22 | Stop reason: SDUPTHER

## 2020-01-28 RX ORDER — LISINOPRIL 10 MG/1
10 TABLET ORAL DAILY
COMMUNITY
End: 2022-02-16 | Stop reason: SDUPTHER

## 2020-01-28 RX ORDER — GABAPENTIN 600 MG/1
600 TABLET ORAL 3 TIMES DAILY
COMMUNITY
End: 2022-06-22

## 2020-01-28 RX ORDER — AMLODIPINE BESYLATE 5 MG/1
5 TABLET ORAL DAILY
Qty: 30 TABLET | Refills: 11 | Status: SHIPPED | OUTPATIENT
Start: 2020-01-28 | End: 2021-02-15

## 2020-01-28 NOTE — PROGRESS NOTES
Subjective   Keyon Olivas is a 74 y.o. male     Chief Complaint   Patient presents with   • Shortness of Breath       HPI    PROBLEM LIST:     1. Coronary artery disease   1.1 CABG x 3, 05/11/2000;  LIMA to LAD.  Left radial artery to RCA.  Left radial artery as T graft to LIMA/OM2  1.2 cath august 2006, stenting to RCA and OM2  1.3 Stress test 11/1/17-no ischemia, preserved LVEF  2. Hypertension   3. Dyslipidemia   3.1 intolerant to Crestor 2012, Lipitor, simvastatin, Lescol, niacin and Zetia  4. H/o hep. C; followed by Dr. Cash   5. Dyspnea on Exertion   5.1 Echo 11/1/17-EF greater than 65%, diastolic dysfunction 1, mild MR, mild TR, OR, PA 30-35    Patient is a 74-year-old male who presents today for a follow-up.  Patient says approximately 2 weeks ago he had an episode where he was outside shoveling dirt.  He said he started having discomfort in his left arm which was like an ache that went to his wrist however it was the worst behind his arm.  He says he became short of breath.  He says he went inside sat down and rested and it went away.  He says this is exactly what he had when he had his previous open heart surgery therefore he became concerned.  He has not had any further episodes thus far.  He denies any palpitations, fluttering, dizziness, presyncope, syncope, orthopnea, PND or edema.  He says he does get short of breath with activity and it was worse over the past few weeks but it seems to be better when he rests again.  He says he is very easily fatigued.  He says he has to stop after about 30 minutes and rest before he can continue on.  He says since his open heart surgery he has had some short-term memory issues.  Patient has not been on any type of cholesterol medicine since 2012.  He was prescribed Repatha the last time he was here but because it was new he decided to wait and then just never got on it.  He has not had his cholesterol checked recently but he is aware of.    Current Outpatient  Medications on File Prior to Visit   Medication Sig Dispense Refill   • ALPRAZolam (XANAX) 1 MG tablet Take 1 mg by mouth Every Night.     • aspirin 325 MG tablet Take 325 mg by mouth Daily.     • gabapentin (NEURONTIN) 600 MG tablet Take 600 mg by mouth 3 (Three) Times a Day.     • lisinopril (PRINIVIL,ZESTRIL) 10 MG tablet Take 10 mg by mouth Daily.     • metoprolol tartrate (LOPRESSOR) 25 MG tablet TAKE ONE TABLET BY MOUTH TWICE A DAY 28 tablet 0   • zolpidem (AMBIEN) 10 MG tablet Take 10 mg by mouth At Night As Needed for Sleep.     • [DISCONTINUED] lisinopril (PRINIVIL,ZESTRIL) 5 MG tablet Take 10 mg by mouth Daily.     • [DISCONTINUED] nitroglycerin (NITROSTAT) 0.4 MG SL tablet Place 1 tablet under the tongue Every 5 (Five) Minutes As Needed for Chest Pain. Take no more than 3 doses in 15 minutes. 35 tablet 5   • [DISCONTINUED] CloNIDine (CATAPRES) 0.2 MG tablet Take 0.2 mg by mouth Every Night.     • [DISCONTINUED] Evolocumab with Infusor 420 MG/3.5ML solution cartridge Inject 1 Cartridge under the skin Every 30 (Thirty) Days. 1 cartridge. 11   • [DISCONTINUED] meclizine (ANTIVERT) 25 MG tablet Take 25 mg by mouth. prn       No current facility-administered medications on file prior to visit.        ALLERGIES    Crestor [rosuvastatin]; Lescol [fluvastatin sodium]; Lipitor [atorvastatin]; Niacin and related; and Penicillins    Past Medical History:   Diagnosis Date   • Anxiety and depression 3/9/2017   • BPH (benign prostatic hyperplasia) 3/9/2017   • CAD (coronary artery disease) 3/9/2017   • DJD (degenerative joint disease) 3/9/2017   • Dyslipidemia 3/9/2017   • Hepatitis C     History of hepatitis C; followed by Dr. Cash in [place].   • Hyperlipidemia    • Hypertension 3/9/2017   • Insomnia 3/9/2017   • DEACON (obstructive sleep apnea) 3/9/2017   • Poor short term memory 3/9/2017   • Small fiber neuropathy    • Ulcerative colitis (CMS/HCC) 3/9/2017       Social History     Socioeconomic History   • Marital  status:      Spouse name: Not on file   • Number of children: Not on file   • Years of education: Not on file   • Highest education level: Not on file   Tobacco Use   • Smoking status: Former Smoker     Packs/day: 1.00     Years: 15.00     Pack years: 15.00     Types: Cigarettes     Start date: 1965     Last attempt to quit: 1983     Years since quittin.0   • Smokeless tobacco: Former User     Types: Chew   • Tobacco comment: smoked 10-20 years, < last 1 PPD   Substance and Sexual Activity   • Alcohol use: No   • Drug use: No   • Sexual activity: Yes     Partners: Female     Birth control/protection: None       Family History   Problem Relation Age of Onset   • Emphysema Mother    • Heart failure Mother    • Heart attack Father    • Memory loss Brother        Review of Systems   Constitutional: Positive for fatigue (EASILY FATIGUED; IN PAST FEW MONTHS; ONLY CAN WORK 30 MIN THEN HAVE TO REST ). Negative for diaphoresis.   HENT: Negative for congestion, rhinorrhea and sneezing.    Eyes: Positive for visual disturbance (WEARS READING GLASSES PRN).   Respiratory: Positive for shortness of breath (EASILY SOA; WORSE ON EXERTION (WORSENED IN LST 2 WEEKS ) RESOLVES WITH REST). Negative for cough, chest tightness and wheezing.    Cardiovascular: Positive for chest pain (2 WEEKS AGO SHOVELING AND HAD LEFT ARM PAIN TO WRIST MOSTLY BEHIND LEFT ARM, ACHE, WHICH IS WHAT HE HAD PRIOR TO CABG; SHORT OF BREATH, STOPPED, RESTED AND WENT AWAY; NO NITRO ). Negative for palpitations and leg swelling.   Gastrointestinal: Negative for abdominal pain, nausea and vomiting.   Endocrine: Negative for cold intolerance and heat intolerance.   Genitourinary: Negative for difficulty urinating, frequency and urgency.   Musculoskeletal: Positive for arthralgias (JOINTS) and back pain (BACK PAIN FROM STENOSIS OF C - SPINE). Negative for neck pain.   Skin: Negative for rash and wound.   Allergic/Immunologic: Positive for  "environmental allergies (SEASONAL ALLERGIES). Negative for food allergies.   Neurological: Negative for dizziness, syncope and light-headedness.   Hematological: Bruises/bleeds easily (BLEEDS EASILY).   Psychiatric/Behavioral: Positive for agitation (EASILY AGITATED) and confusion (EASILY CONFUSED). Negative for sleep disturbance (DENIES WAKING UP SMOTHERING/SOA). The patient is nervous/anxious (EASILY NERVOUS/ANXIOUS).        Objective   /85 (BP Location: Left arm, Patient Position: Sitting)   Pulse 68   Ht 167.6 cm (66\")   Wt 85.6 kg (188 lb 12.8 oz)   SpO2 98%   BMI 30.47 kg/m²   Vitals:    01/28/20 1350   BP: 175/85   BP Location: Left arm   Patient Position: Sitting   Pulse: 68   SpO2: 98%   Weight: 85.6 kg (188 lb 12.8 oz)   Height: 167.6 cm (66\")      Lab Results (most recent)     None        Physical Exam   Constitutional: He is oriented to person, place, and time. Vital signs are normal. He appears well-developed and well-nourished. He is active and cooperative.   HENT:   Head: Normocephalic.   Eyes: Lids are normal.   Neck: Normal carotid pulses, no hepatojugular reflux and no JVD present. Carotid bruit is not present.   Cardiovascular: Normal rate, regular rhythm and normal heart sounds.   Pulses:       Radial pulses are 2+ on the right side, and 2+ on the left side.        Dorsalis pedis pulses are 2+ on the right side, and 2+ on the left side.        Posterior tibial pulses are 2+ on the right side, and 2+ on the left side.   NO EDEMA BLE.    Pulmonary/Chest: Effort normal and breath sounds normal.   Abdominal: Normal appearance and bowel sounds are normal.   Neurological: He is alert and oriented to person, place, and time.   Skin: Skin is warm, dry and intact.   Psychiatric: He has a normal mood and affect. His speech is normal and behavior is normal. Judgment and thought content normal. Cognition and memory are normal.       Procedure     ECG 12 Lead  Date/Time: 1/28/2020 2:24 " PM  Performed by: Christianne Shah APRN  Authorized by: Christianne Shah APRN   Comparison: compared with previous ECG from 10/4/2017  Comparison to previous ECG: Rare PAC  Rhythm: sinus rhythm  Ectopy: atrial premature contractions  Rate: normal  BPM: 62    Clinical impression: non-specific ECG                 Assessment/Plan      Diagnosis Plan   1. Other chest pain  Stress Test With Myocardial Perfusion One Day    Adult Transthoracic Echo Complete W/ Cont if Necessary Per Protocol   2. Coronary artery disease involving coronary bypass graft of native heart without angina pectoris  Lipid Panel    Stress Test With Myocardial Perfusion One Day    Adult Transthoracic Echo Complete W/ Cont if Necessary Per Protocol    nitroglycerin (NITROSTAT) 0.4 MG SL tablet   3. Essential hypertension  Comprehensive Metabolic Panel    TSH    Stress Test With Myocardial Perfusion One Day    Adult Transthoracic Echo Complete W/ Cont if Necessary Per Protocol    amLODIPine (NORVASC) 5 MG tablet   4. DEACON (obstructive sleep apnea)  Stress Test With Myocardial Perfusion One Day    Adult Transthoracic Echo Complete W/ Cont if Necessary Per Protocol   5. Dyslipidemia  Lipid Panel    Stress Test With Myocardial Perfusion One Day    Adult Transthoracic Echo Complete W/ Cont if Necessary Per Protocol   6. Shortness of breath  Stress Test With Myocardial Perfusion One Day    Adult Transthoracic Echo Complete W/ Cont if Necessary Per Protocol   7. Malaise and fatigue  Stress Test With Myocardial Perfusion One Day    Adult Transthoracic Echo Complete W/ Cont if Necessary Per Protocol   8. Healthcare maintenance  Lipid Panel    Comprehensive Metabolic Panel    TSH    Magnesium    Stress Test With Myocardial Perfusion One Day    Adult Transthoracic Echo Complete W/ Cont if Necessary Per Protocol   9. Premature atrial contraction  TSH    Magnesium    Stress Test With Myocardial Perfusion One Day    Adult Transthoracic Echo Complete W/ Cont if  Necessary Per Protocol       Return in about 14 weeks (around 5/5/2020).  Other chest pain/CAD/hypertension/dyslipidemia/shortness of breath/fatigue-patient will have ischemia work-up, stress and echo.  He will use nitro PRN for chest pain no resolution he will go to the ER.  He will come in either this week or next week to get blood work which will include a lipid, CMP, TSH and magnesium.  I advised once I have his lab work we will submit him for either Praluent or Repatha.  I am adding amlodipine for better blood pressure control.  He will continue his medication regimen for now otherwise.  He will follow-up in 14 weeks or sooner if any changes.         Patient's Body mass index is 30.47 kg/m². BMI is above normal parameters. Recommendations include: educational material and referral to primary care.      Electronically signed by:

## 2020-01-28 NOTE — PATIENT INSTRUCTIONS

## 2020-01-29 ENCOUNTER — LAB (OUTPATIENT)
Dept: LAB | Facility: HOSPITAL | Age: 75
End: 2020-01-29

## 2020-01-29 DIAGNOSIS — I49.1 PREMATURE ATRIAL CONTRACTION: ICD-10-CM

## 2020-01-29 DIAGNOSIS — I25.810 CORONARY ARTERY DISEASE INVOLVING CORONARY BYPASS GRAFT OF NATIVE HEART WITHOUT ANGINA PECTORIS: ICD-10-CM

## 2020-01-29 DIAGNOSIS — I10 ESSENTIAL HYPERTENSION: ICD-10-CM

## 2020-01-29 DIAGNOSIS — E78.5 DYSLIPIDEMIA: ICD-10-CM

## 2020-01-29 DIAGNOSIS — Z00.00 HEALTHCARE MAINTENANCE: ICD-10-CM

## 2020-01-29 LAB
ALBUMIN SERPL-MCNC: 4.14 G/DL (ref 3.5–5.2)
ALBUMIN/GLOB SERPL: 1.2 G/DL
ALP SERPL-CCNC: 73 U/L (ref 39–117)
ALT SERPL W P-5'-P-CCNC: 14 U/L (ref 1–41)
ANION GAP SERPL CALCULATED.3IONS-SCNC: 14.2 MMOL/L (ref 5–15)
AST SERPL-CCNC: 24 U/L (ref 1–40)
BILIRUB SERPL-MCNC: 0.5 MG/DL (ref 0.2–1.2)
BUN BLD-MCNC: 17 MG/DL (ref 8–23)
BUN/CREAT SERPL: 14.8 (ref 7–25)
CALCIUM SPEC-SCNC: 9.7 MG/DL (ref 8.6–10.5)
CHLORIDE SERPL-SCNC: 106 MMOL/L (ref 98–107)
CHOLEST SERPL-MCNC: 187 MG/DL (ref 0–200)
CO2 SERPL-SCNC: 22.8 MMOL/L (ref 22–29)
CREAT BLD-MCNC: 1.15 MG/DL (ref 0.76–1.27)
GFR SERPL CREATININE-BSD FRML MDRD: 63 ML/MIN/1.73
GLOBULIN UR ELPH-MCNC: 3.5 GM/DL
GLUCOSE BLD-MCNC: 102 MG/DL (ref 65–99)
HDLC SERPL-MCNC: 33 MG/DL (ref 40–60)
LDLC SERPL CALC-MCNC: 88 MG/DL (ref 0–100)
LDLC/HDLC SERPL: 2.68 {RATIO}
MAGNESIUM SERPL-MCNC: 1.7 MG/DL (ref 1.6–2.4)
POTASSIUM BLD-SCNC: 4.3 MMOL/L (ref 3.5–5.2)
PROT SERPL-MCNC: 7.6 G/DL (ref 6–8.5)
SODIUM BLD-SCNC: 143 MMOL/L (ref 136–145)
TRIGL SERPL-MCNC: 328 MG/DL (ref 0–150)
TSH SERPL DL<=0.05 MIU/L-ACNC: 1.6 UIU/ML (ref 0.27–4.2)
VLDLC SERPL-MCNC: 65.6 MG/DL

## 2020-01-29 PROCEDURE — 36415 COLL VENOUS BLD VENIPUNCTURE: CPT

## 2020-01-29 PROCEDURE — 83735 ASSAY OF MAGNESIUM: CPT | Performed by: NURSE PRACTITIONER

## 2020-01-29 PROCEDURE — 80053 COMPREHEN METABOLIC PANEL: CPT | Performed by: NURSE PRACTITIONER

## 2020-01-29 PROCEDURE — 84443 ASSAY THYROID STIM HORMONE: CPT | Performed by: NURSE PRACTITIONER

## 2020-01-29 PROCEDURE — 80061 LIPID PANEL: CPT | Performed by: NURSE PRACTITIONER

## 2020-01-30 ENCOUNTER — TELEPHONE (OUTPATIENT)
Dept: CARDIOLOGY | Facility: CLINIC | Age: 75
End: 2020-01-30

## 2020-01-30 NOTE — TELEPHONE ENCOUNTER
Called patient and left message to return call regarding lab results. -;[facility]    ---- Message from LISSET Pandey sent at 1/30/2020  6:40 AM EST -----  Please advise patient his sugar is a little high. His trig are as well.  Watch sweet foods, carbs/sugars.  LDL is good.  TSH   Order: [id number]   Status:  Final result   Visible to patient:  Yes (MyChart) Dx:  Premature atrial contraction; Essenti...   Component  Ref Range & Units 1d ago   TSH  0.270 - 4.200 uIU/mL 1.600    Resulting Agency  [facility]         Specimen Collected: 01/29/20 09:11 Last Resulted: 01/29/20 19:06         Lab Flowsheet       Order Details       View Encounter       Lab and Collection Details       Routing       Result History               Result Notes for Magnesium     Notes recorded by Christianne Shah APRN on 1/30/2020 at 6:40 AM EST  Please advise patient his sugar is a little high. His trig are as well.  Watch sweet foods, carbs/sugars.  LDL is good.   Magnesium   Order: [id number]   Status:  Final result   Visible to patient:  Yes (MyChart) Dx:  Premature atrial contraction; Healthc...   Component  Ref Range & Units 1d ago   Magnesium  1.6 - 2.4 mg/dL 1.7    Resulting Agency  [facility]         Specimen Collected: 01/29/20 09:11 Last Resulted: 01/29/20 19:05         Lab Flowsheet       Order Details       View Encounter       Lab and Collection Details       Routing       Result History               Result Notes for Comprehensive Metabolic Panel     Notes recorded by Christianne Shah APRN on 1/30/2020 at 6:40 AM EST  Please advise patient his sugar is a little high. His trig are as well.  Watch sweet foods, carbs/sugars.  LDL is good.   Contains abnormal data Comprehensive Metabolic Panel   Order: [id number]   Status:  Final result   Visible to patient:  Yes ([facility] Dx:  Essential hypertension; Healthcare ma...   Component  Ref Range & Units 1d ago 1yr ago   Glucose  65 - 99 mg/dL 102High   95 R   BUN  8 - 23 mg/dL 17  23 R    Creatinine  0.76 - 1.27 mg/dL 1.15  1.20 R   Sodium  136 - 145 mmol/L 143  137 R   Potassium  3.5 - 5.2 mmol/L 4.3  4.1 R   Chloride  98 - 107 mmol/L 106  102 R   CO2  22.0 - 29.0 mmol/L 22.8  27.0 R   Calcium  8.6 - 10.5 mg/dL 9.7  10.2 R   Total Protein  6.0 - 8.5 g/dL 7.6  7.6 R   Albumin  3.50 - 5.20 g/dL 4.14  4.60 R   ALT (SGPT)  1 - 41 U/L 14  31 R   AST (SGOT)  1 - 40 U/L 24  27 R   Alkaline Phosphatase  39 - 117 U/L 73  71 R   Total Bilirubin  0.2 - 1.2 mg/dL 0.5  1.0 R   eGFR Non African Amer  >60 mL/min/1.73 63  60Low     Globulin  gm/dL 3.5  3.0    A/G Ratio  g/dL 1.2  1.5 R   BUN/Creatinine Ratio  7.0 - 25.0 14.8  19.2    Anion Gap  5.0 - 15.0 mmol/L 14.2  8.0 R   Resulting Agency  [facility]      Narrative   Performed by: [facility]   GFR Normal >60  Chronic Kidney Disease <60  Kidney Failure <15      Specimen Collected: 01/29/20 09:11 Last Resulted: 01/29/20 19:05         Lab Flowsheet       Order Details       View Encounter       Lab and Collection Details       Routing       Result History         R=Reference range differs from displayed range           Result Notes for Lipid Panel     Notes recorded by Christianne Shah APRN on 1/30/2020 at 6:40 AM EST  Please advise patient his sugar is a little high. His trig are as well.  Watch sweet foods, carbs/sugars.  LDL is good.   Contains abnormal data Lipid Panel   Order: [id number]   Status:  Final result   Visible to patient:  Yes (MyChart) Dx:  Dyslipidemia; Healthcare maintenance;...   Component  Ref Range & Units 1d ago 1yr ago   Total Cholesterol  0 - 200 mg/dL 187  176    Triglycerides  0 - 150 mg/dL 328High   94    HDL Cholesterol  40 - 60 mg/dL 33Low   42    LDL Cholesterol   0 - 100 mg/dL 88  129 R   VLDL Cholesterol  mg/dL 65.6     LDL/HDL Ratio 2.68     Resulting Agency  [facility]      Narrative   Performed by: [facility]   Cholesterol Reference Ranges  (U.S. Department of Health and Human Services ATP III  Classifications)    Desirable          <200 mg/dL  Borderline High    200-239 mg/dL  High Risk          >240 mg/dL      Triglyceride Reference Ranges  (U.S. Department of Health and Human Services ATP III Classifications)    Normal           <150 mg/dL  Borderline High  150-199 mg/dL  High             200-499 mg/dL  Very High        >500 mg/dL    HDL Reference Ranges  (U.S. Department of Health and Human Services ATP III Classifcations)    Low     <40 mg/dl (major risk factor for CHD)  High    >60 mg/dl ('negative' risk factor for CHD)        LDL Reference Ranges  (U.S. Department of Health and Human Services ATP III Classifcations)    Optimal          <100 mg/dL  Near Optimal     100-129 mg/dL  Borderline High  130-159 mg/dL  High             160-189 mg/dL  Very High        >189 mg/dL

## 2020-01-30 NOTE — TELEPHONE ENCOUNTER
Pt LVM returning call.   #[phone]      Informed pt of his results and the message from Christianne, he verbalized understanding.

## 2020-02-27 ENCOUNTER — HOSPITAL ENCOUNTER (OUTPATIENT)
Dept: CARDIOLOGY | Facility: HOSPITAL | Age: 75
Discharge: HOME | End: 2020-02-27

## 2020-02-27 VITALS — BODY MASS INDEX: 30.33 KG/M2 | HEIGHT: 66 IN | WEIGHT: 188.71 LBS

## 2020-02-27 DIAGNOSIS — R53.81 MALAISE AND FATIGUE: ICD-10-CM

## 2020-02-27 DIAGNOSIS — R53.83 MALAISE AND FATIGUE: ICD-10-CM

## 2020-02-27 DIAGNOSIS — E78.5 DYSLIPIDEMIA: ICD-10-CM

## 2020-02-27 DIAGNOSIS — R07.89 OTHER CHEST PAIN: ICD-10-CM

## 2020-02-27 DIAGNOSIS — I49.1 PREMATURE ATRIAL CONTRACTION: ICD-10-CM

## 2020-02-27 DIAGNOSIS — Z00.00 HEALTHCARE MAINTENANCE: ICD-10-CM

## 2020-02-27 DIAGNOSIS — R06.02 SHORTNESS OF BREATH: ICD-10-CM

## 2020-02-27 DIAGNOSIS — I25.810 CORONARY ARTERY DISEASE INVOLVING CORONARY BYPASS GRAFT OF NATIVE HEART WITHOUT ANGINA PECTORIS: ICD-10-CM

## 2020-02-27 DIAGNOSIS — G47.33 OSA (OBSTRUCTIVE SLEEP APNEA): ICD-10-CM

## 2020-02-27 DIAGNOSIS — I10 ESSENTIAL HYPERTENSION: ICD-10-CM

## 2020-02-27 PROCEDURE — 93306 TTE W/DOPPLER COMPLETE: CPT

## 2020-02-27 PROCEDURE — A9500 TC99M SESTAMIBI: HCPCS | Performed by: INTERNAL MEDICINE

## 2020-02-27 PROCEDURE — 0 TECHNETIUM SESTAMIBI: Performed by: INTERNAL MEDICINE

## 2020-02-27 PROCEDURE — 78452 HT MUSCLE IMAGE SPECT MULT: CPT

## 2020-02-27 PROCEDURE — 93017 CV STRESS TEST TRACING ONLY: CPT

## 2020-02-27 PROCEDURE — 25010000002 REGADENOSON 0.4 MG/5ML SOLUTION: Performed by: INTERNAL MEDICINE

## 2020-02-27 PROCEDURE — 93016 CV STRESS TEST SUPVJ ONLY: CPT | Performed by: NURSE PRACTITIONER

## 2020-02-27 PROCEDURE — 93018 CV STRESS TEST I&R ONLY: CPT | Performed by: INTERNAL MEDICINE

## 2020-02-27 PROCEDURE — 93306 TTE W/DOPPLER COMPLETE: CPT | Performed by: INTERNAL MEDICINE

## 2020-02-27 PROCEDURE — 78452 HT MUSCLE IMAGE SPECT MULT: CPT | Performed by: INTERNAL MEDICINE

## 2020-02-27 RX ADMIN — TECHNETIUM TC 99M SESTAMIBI 1 DOSE: 1 INJECTION INTRAVENOUS at 10:11

## 2020-02-27 RX ADMIN — REGADENOSON 0.4 MG: 0.08 INJECTION, SOLUTION INTRAVENOUS at 10:12

## 2020-02-27 RX ADMIN — TECHNETIUM TC 99M SESTAMIBI 1 DOSE: 1 INJECTION INTRAVENOUS at 10:12

## 2020-02-28 ENCOUNTER — TELEPHONE (OUTPATIENT)
Dept: CARDIOLOGY | Facility: CLINIC | Age: 75
End: 2020-02-28

## 2020-02-28 LAB
BH CV STRESS BP STAGE 1: NORMAL
BH CV STRESS COMMENTS STAGE 1: NORMAL
BH CV STRESS DOSE REGADENOSON STAGE 1: 0.4
BH CV STRESS DURATION MIN STAGE 1: 0
BH CV STRESS DURATION SEC STAGE 1: 10
BH CV STRESS HR STAGE 1: 68
BH CV STRESS PROTOCOL 1: NORMAL
BH CV STRESS RECOVERY BP: NORMAL MMHG
BH CV STRESS RECOVERY HR: 64 BPM
BH CV STRESS STAGE 1: 1
MAXIMAL PREDICTED HEART RATE: 148 BPM
PERCENT MAX PREDICTED HR: 50 %
STRESS BASELINE BP: NORMAL MMHG
STRESS BASELINE HR: 45 BPM
STRESS PERCENT HR: 59 %
STRESS POST PEAK BP: NORMAL MMHG
STRESS POST PEAK HR: 74 BPM
STRESS TARGET HR: 126 BPM

## 2020-02-28 NOTE — TELEPHONE ENCOUNTER
Called and left message to return call regarding test results and appt date and time. -;[facility]    ---- Message from LISSET Pandey sent at 2/28/2020  6:35 AM EST -----  Get patient in next week.  This is a duplicate as I forgot to venecia important.  Stress Test With Myocardial Perfusion One Day   Order: [id number]   Status:  Final result   Visible to patient:  Yes (MyChart) Dx:  Shortness of breath; Other chest pain...   Details     Reading Physician Reading Date Result Priority   Juan C Samaniego MD 2/27/2020 Routine   Juan C Samaniego MD 2/27/2020    Jasmine Boone APRN 2/27/2020       Result Text     1.  Mild diaphragmatic ischemia.     2.  Minimally depressed post stress ejection fraction of 52% with no focal wall motion abnormalities.     3.  No evidence of pharmacologically induced transient ischemic dilation or of increased lung uptake of radiopharmaceutical.

## 2020-02-28 NOTE — TELEPHONE ENCOUNTER
Called patient and notified him of stress results. Patient notified of new appt date and time as well. -EMILY;MICHELLE

## 2020-03-01 LAB
BH CV ECHO MEAS - ACS: 1.8 CM
BH CV ECHO MEAS - AO MAX PG (FULL): 2.3 MMHG
BH CV ECHO MEAS - AO MAX PG: 5.4 MMHG
BH CV ECHO MEAS - AO MEAN PG (FULL): 2 MMHG
BH CV ECHO MEAS - AO MEAN PG: 3 MMHG
BH CV ECHO MEAS - AO ROOT AREA (BSA CORRECTED): 1.6
BH CV ECHO MEAS - AO ROOT AREA: 7.5 CM^2
BH CV ECHO MEAS - AO ROOT DIAM: 3.1 CM
BH CV ECHO MEAS - AO V2 MAX: 116 CM/SEC
BH CV ECHO MEAS - AO V2 MEAN: 81.8 CM/SEC
BH CV ECHO MEAS - AO V2 VTI: 29.3 CM
BH CV ECHO MEAS - AVA(I,A): 2.2 CM^2
BH CV ECHO MEAS - AVA(I,D): 2.2 CM^2
BH CV ECHO MEAS - AVA(V,A): 2.4 CM^2
BH CV ECHO MEAS - AVA(V,D): 2.4 CM^2
BH CV ECHO MEAS - BSA(HAYCOCK): 2 M^2
BH CV ECHO MEAS - BSA: 1.9 M^2
BH CV ECHO MEAS - BZI_BMI: 30.3 KILOGRAMS/M^2
BH CV ECHO MEAS - BZI_METRIC_HEIGHT: 167.6 CM
BH CV ECHO MEAS - BZI_METRIC_WEIGHT: 85.3 KG
BH CV ECHO MEAS - EDV(CUBED): 126.5 ML
BH CV ECHO MEAS - EDV(MOD-SP4): 75.9 ML
BH CV ECHO MEAS - EDV(TEICH): 119.3 ML
BH CV ECHO MEAS - EF(CUBED): 76.5 %
BH CV ECHO MEAS - EF(MOD-SP4): 58 %
BH CV ECHO MEAS - EF(TEICH): 68.2 %
BH CV ECHO MEAS - ESV(CUBED): 29.8 ML
BH CV ECHO MEAS - ESV(MOD-SP4): 31.9 ML
BH CV ECHO MEAS - ESV(TEICH): 37.9 ML
BH CV ECHO MEAS - FS: 38.2 %
BH CV ECHO MEAS - IVS/LVPW: 1
BH CV ECHO MEAS - IVSD: 1.1 CM
BH CV ECHO MEAS - LA DIMENSION(2D): 4.7 CM
BH CV ECHO MEAS - LA DIMENSION: 4.7 CM
BH CV ECHO MEAS - LA/AO: 1.5
BH CV ECHO MEAS - LAT PEAK E' VEL: 10.6 CM/SEC
BH CV ECHO MEAS - LV DIASTOLIC VOL/BSA (35-75): 39 ML/M^2
BH CV ECHO MEAS - LV IVRT: 0.12 SEC
BH CV ECHO MEAS - LV MASS(C)D: 195.7 GRAMS
BH CV ECHO MEAS - LV MASS(C)DI: 100.4 GRAMS/M^2
BH CV ECHO MEAS - LV MAX PG: 3.1 MMHG
BH CV ECHO MEAS - LV MEAN PG: 1 MMHG
BH CV ECHO MEAS - LV SYSTOLIC VOL/BSA (12-30): 16.4 ML/M^2
BH CV ECHO MEAS - LV V1 MAX: 87.5 CM/SEC
BH CV ECHO MEAS - LV V1 MEAN: 53.8 CM/SEC
BH CV ECHO MEAS - LV V1 VTI: 20.5 CM
BH CV ECHO MEAS - LVIDD: 5 CM
BH CV ECHO MEAS - LVIDS: 3.1 CM
BH CV ECHO MEAS - LVLD AP4: 6.2 CM
BH CV ECHO MEAS - LVLS AP4: 5.5 CM
BH CV ECHO MEAS - LVOT AREA (M): 3.1 CM^2
BH CV ECHO MEAS - LVOT AREA: 3.1 CM^2
BH CV ECHO MEAS - LVOT DIAM: 2 CM
BH CV ECHO MEAS - LVPWD: 1.1 CM
BH CV ECHO MEAS - MED PEAK E' VEL: 7.8 CM/SEC
BH CV ECHO MEAS - MR MAX PG: 83.9 MMHG
BH CV ECHO MEAS - MR MAX VEL: 458 CM/SEC
BH CV ECHO MEAS - MV A MAX VEL: 47.6 CM/SEC
BH CV ECHO MEAS - MV DEC SLOPE: 375 CM/SEC^2
BH CV ECHO MEAS - MV DEC TIME: 0.25 SEC
BH CV ECHO MEAS - MV E MAX VEL: 64 CM/SEC
BH CV ECHO MEAS - MV E/A: 1.3
BH CV ECHO MEAS - MV MAX PG: 2.4 MMHG
BH CV ECHO MEAS - MV MEAN PG: 1 MMHG
BH CV ECHO MEAS - MV P1/2T MAX VEL: 83.1 CM/SEC
BH CV ECHO MEAS - MV P1/2T: 64.9 MSEC
BH CV ECHO MEAS - MV V2 MAX: 77.5 CM/SEC
BH CV ECHO MEAS - MV V2 MEAN: 36.3 CM/SEC
BH CV ECHO MEAS - MV V2 VTI: 33.2 CM
BH CV ECHO MEAS - MVA P1/2T LCG: 2.6 CM^2
BH CV ECHO MEAS - MVA(P1/2T): 3.4 CM^2
BH CV ECHO MEAS - MVA(VTI): 1.9 CM^2
BH CV ECHO MEAS - PA MAX PG (FULL): 0.53 MMHG
BH CV ECHO MEAS - PA MAX PG: 1.4 MMHG
BH CV ECHO MEAS - PA MEAN PG (FULL): 1 MMHG
BH CV ECHO MEAS - PA MEAN PG: 1 MMHG
BH CV ECHO MEAS - PA V2 MAX: 58.8 CM/SEC
BH CV ECHO MEAS - PA V2 MEAN: 44.5 CM/SEC
BH CV ECHO MEAS - PA V2 VTI: 14.9 CM
BH CV ECHO MEAS - RAP SYSTOLE: 10 MMHG
BH CV ECHO MEAS - RV MAX PG: 0.86 MMHG
BH CV ECHO MEAS - RV MEAN PG: 0 MMHG
BH CV ECHO MEAS - RV V1 MAX: 46.3 CM/SEC
BH CV ECHO MEAS - RV V1 MEAN: 32.1 CM/SEC
BH CV ECHO MEAS - RV V1 VTI: 12.7 CM
BH CV ECHO MEAS - RVDD: 2.8 CM
BH CV ECHO MEAS - RVSP: 40 MMHG
BH CV ECHO MEAS - SI(AO): 113.5 ML/M^2
BH CV ECHO MEAS - SI(CUBED): 49.6 ML/M^2
BH CV ECHO MEAS - SI(LVOT): 33.1 ML/M^2
BH CV ECHO MEAS - SI(MOD-SP4): 22.6 ML/M^2
BH CV ECHO MEAS - SI(TEICH): 41.8 ML/M^2
BH CV ECHO MEAS - SV(AO): 221.1 ML
BH CV ECHO MEAS - SV(CUBED): 96.7 ML
BH CV ECHO MEAS - SV(LVOT): 64.4 ML
BH CV ECHO MEAS - SV(MOD-SP4): 44 ML
BH CV ECHO MEAS - SV(TEICH): 81.4 ML
BH CV ECHO MEAS - TAPSE (>1.6): 1.2 CM2
BH CV ECHO MEAS - TR MAX VEL: 270 CM/SEC
BH CV ECHO MEASUREMENTS AVERAGE E/E' RATIO: 6.96
LEFT ATRIUM VOLUME INDEX: 35.8 ML/M2
LEFT ATRIUM VOLUME: 69.8 CM3
MAXIMAL PREDICTED HEART RATE: 148 BPM
STRESS TARGET HR: 126 BPM

## 2020-03-05 ENCOUNTER — OFFICE VISIT (OUTPATIENT)
Dept: CARDIOLOGY | Facility: CLINIC | Age: 75
End: 2020-03-05

## 2020-03-05 VITALS
DIASTOLIC BLOOD PRESSURE: 71 MMHG | SYSTOLIC BLOOD PRESSURE: 120 MMHG | BODY MASS INDEX: 28.28 KG/M2 | HEART RATE: 56 BPM | WEIGHT: 176 LBS | OXYGEN SATURATION: 99 % | HEIGHT: 66 IN

## 2020-03-05 DIAGNOSIS — I25.810 CORONARY ARTERY DISEASE INVOLVING CORONARY BYPASS GRAFT OF NATIVE HEART WITHOUT ANGINA PECTORIS: Primary | ICD-10-CM

## 2020-03-05 DIAGNOSIS — Z00.00 HEALTHCARE MAINTENANCE: ICD-10-CM

## 2020-03-05 DIAGNOSIS — I10 ESSENTIAL HYPERTENSION: ICD-10-CM

## 2020-03-05 DIAGNOSIS — R94.39 ABNORMAL STRESS TEST: ICD-10-CM

## 2020-03-05 DIAGNOSIS — G47.33 OSA (OBSTRUCTIVE SLEEP APNEA): ICD-10-CM

## 2020-03-05 DIAGNOSIS — E78.5 DYSLIPIDEMIA: ICD-10-CM

## 2020-03-05 DIAGNOSIS — R06.02 SHORTNESS OF BREATH: ICD-10-CM

## 2020-03-05 PROCEDURE — 99214 OFFICE O/P EST MOD 30 MIN: CPT | Performed by: NURSE PRACTITIONER

## 2020-03-05 RX ORDER — EZETIMIBE 10 MG/1
10 TABLET ORAL DAILY
Qty: 90 TABLET | Refills: 3 | Status: SHIPPED | OUTPATIENT
Start: 2020-03-05 | End: 2022-06-22

## 2020-03-05 NOTE — PROGRESS NOTES
Subjective   Keyon Olivas is a 74 y.o. male     Chief Complaint   Patient presents with   • Follow-up     Here for a follow up on testing        HPI    PROBLEM LIST:     1. Coronary artery disease   1.1 CABG x 3, 05/11/2000;  LIMA to LAD.  Left radial artery to RCA.  Left radial artery as T graft to LIMA/OM2  1.2 cath august 2006, stenting to RCA and OM2  1.3 Stress test 11/1/17-no ischemia, preserved LVEF  1.4 stress test 2/27/2020-mild diaphragmatic ischemia, post-rest EF 52%, no evidence of pharmacologically induced transient ischemic dilation  2. Hypertension   3. Dyslipidemia   3.1 intolerant to Crestor 2012, Lipitor, simvastatin, Lescol, niacin and Zetia  4. H/o hep. C; followed by Dr. Cash   5. Dyspnea on Exertion   5.1 Echo 11/1/17-EF greater than 65%, diastolic dysfunction 1, mild MR, mild TR, UT, PA 30-35  5.2 echo 2/27/2020-EF 50%, mild LVH, diastolic dysfunction 1, moderate to severe left atrial large amount, mild to moderate MR, mild TR, PA in the 30s  6.  DEACON, CPAP    Patient is a 74-year-old male who presents today for follow-up on testing.  He denies any chest pain, pressure, palpitations, fluttering, dizziness, presyncope, syncope, orthopnea, PND or edema.  He says he is rarely lightheaded with position change.  He says he does get short of breath when he does more than normal like digging or doing heavy work.  He says he does tire easily.  Patient says he is noticed on occasion but his heart rate is down to the 30s and 40s.  He says it has been this way for about a year now and he realizes now that he had increase his beta-blocker.  We will decrease it back to 12.5 twice a day and he will monitor his blood pressure.    We went over stress and echo.    Current Outpatient Medications on File Prior to Visit   Medication Sig Dispense Refill   • ALPRAZolam (XANAX) 1 MG tablet Take 1 mg by mouth Every Night.     • amLODIPine (NORVASC) 5 MG tablet Take 1 tablet by mouth Daily. 30 tablet 11   • aspirin  325 MG tablet Take 325 mg by mouth Daily.     • gabapentin (NEURONTIN) 600 MG tablet Take 600 mg by mouth 3 (Three) Times a Day.     • lisinopril (PRINIVIL,ZESTRIL) 10 MG tablet Take 10 mg by mouth Daily.     • nitroglycerin (NITROSTAT) 0.4 MG SL tablet Place 1 tablet under the tongue Every 5 (Five) Minutes As Needed for Chest Pain. Take no more than 3 doses in 15 minutes. 35 tablet 5   • zolpidem (AMBIEN) 10 MG tablet Take 10 mg by mouth At Night As Needed for Sleep.     • [DISCONTINUED] metoprolol tartrate (LOPRESSOR) 25 MG tablet TAKE ONE TABLET BY MOUTH TWICE A DAY 28 tablet 0     No current facility-administered medications on file prior to visit.        ALLERGIES    Crestor [rosuvastatin]; Lescol [fluvastatin sodium]; Lipitor [atorvastatin]; Niacin and related; and Penicillins    Past Medical History:   Diagnosis Date   • Anxiety and depression 3/9/2017   • BPH (benign prostatic hyperplasia) 3/9/2017   • CAD (coronary artery disease) 3/9/2017   • DJD (degenerative joint disease) 3/9/2017   • Dyslipidemia 3/9/2017   • Hepatitis C     History of hepatitis C; followed by Dr. Cash in [place].   • Hyperlipidemia    • Hypertension 3/9/2017   • Insomnia 3/9/2017   • DEACON (obstructive sleep apnea) 3/9/2017   • Poor short term memory 3/9/2017   • Small fiber neuropathy    • Ulcerative colitis (CMS/HCC) 3/9/2017       Social History     Socioeconomic History   • Marital status:      Spouse name: Not on file   • Number of children: Not on file   • Years of education: Not on file   • Highest education level: Not on file   Tobacco Use   • Smoking status: Former Smoker     Packs/day: 1.00     Years: 15.00     Pack years: 15.00     Types: Cigarettes     Start date: 1965     Last attempt to quit: 1983     Years since quittin.2   • Smokeless tobacco: Former User     Types: Chew   • Tobacco comment: smoked 10-20 years, < last 1 PPD   Substance and Sexual Activity   • Alcohol use: No   • Drug use: No   •  "Sexual activity: Yes     Partners: Female     Birth control/protection: None       Family History   Problem Relation Age of Onset   • Emphysema Mother    • Heart failure Mother    • Heart attack Father    • Memory loss Brother        Review of Systems   Constitutional: Positive for fatigue (tires easily ). Negative for chills and fever.   HENT: Negative for congestion, rhinorrhea and sore throat.    Eyes: Positive for visual disturbance (reading glasses ).   Respiratory: Positive for shortness of breath (SOA with moderate exertion; like digging, has to be doing heavy work ). Negative for chest tightness.    Cardiovascular: Negative for chest pain, palpitations and leg swelling.   Gastrointestinal: Negative for abdominal pain, blood in stool, nausea and vomiting.   Endocrine: Positive for cold intolerance. Negative for heat intolerance.   Genitourinary: Negative for dysuria, frequency, hematuria and urgency.   Musculoskeletal: Negative for arthralgias and back pain.   Skin: Negative for rash and wound.   Allergic/Immunologic: Negative for environmental allergies and food allergies.   Neurological: Positive for light-headedness (rarely ). Negative for dizziness and weakness.   Hematological: Bruises/bleeds easily (bleeds easily ).   Psychiatric/Behavioral: Negative for sleep disturbance (denies waking with smothering ).       Objective   /71 (BP Location: Left arm, Patient Position: Sitting)   Pulse 56   Ht 167.6 cm (66\")   Wt 79.8 kg (176 lb)   SpO2 99%   BMI 28.41 kg/m²   Vitals:    03/05/20 0851   BP: 120/71   BP Location: Left arm   Patient Position: Sitting   Pulse: 56   SpO2: 99%   Weight: 79.8 kg (176 lb)   Height: 167.6 cm (66\")      Lab Results (most recent)     None        Physical Exam   Constitutional: He is oriented to person, place, and time. Vital signs are normal. He appears well-developed and well-nourished. He is active and cooperative.   HENT:   Head: Normocephalic.   Eyes: Lids are " normal.   Neck: Normal carotid pulses, no hepatojugular reflux and no JVD present. Carotid bruit is not present.   Cardiovascular: Regular rhythm and normal heart sounds. Bradycardia present.   Pulses:       Radial pulses are 2+ on the left side.        Dorsalis pedis pulses are 2+ on the right side, and 2+ on the left side.        Posterior tibial pulses are 2+ on the right side, and 2+ on the left side.   No edema BLE.   Pulmonary/Chest: Effort normal and breath sounds normal.   Abdominal: Normal appearance and bowel sounds are normal.   Neurological: He is alert and oriented to person, place, and time.   Skin: Skin is warm, dry and intact.   Psychiatric: He has a normal mood and affect. His speech is normal and behavior is normal. Judgment and thought content normal. Cognition and memory are normal.       Procedure   Procedures         Assessment/Plan      Diagnosis Plan   1. Coronary artery disease involving coronary bypass graft of native heart without angina pectoris  ezetimibe (ZETIA) 10 MG tablet    Lipid Panel    metoprolol tartrate (LOPRESSOR) 25 MG tablet   2. Essential hypertension  Comprehensive Metabolic Panel    metoprolol tartrate (LOPRESSOR) 25 MG tablet   3. DEACON (obstructive sleep apnea)     4. Shortness of breath     5. Dyslipidemia  ezetimibe (ZETIA) 10 MG tablet    Lipid Panel   6. Abnormal stress test     7. Healthcare maintenance  Lipid Panel    Comprehensive Metabolic Panel       Return in about 4 months (around 7/5/2020).  CAD-patient is on aspirin, beta and he is intolerant to statins but willing to try Zetia.  He was on the injections in the past but they were too expensive.  Hypertension-patient's doing well on amlodipine, lisinopril and metoprolol.  We are however going to decrease his metoprolol due to bradycardia.  DEACON-patient is compliant with CPAP.  Dyslipidemia-patient is going to try Zetia and will get lipid and CMP in 6 weeks.  Abnormal stress test-patient is having no signs of  ischemia therefore it is low risk stress test so we will not proceed with any further work-up at this time.  Should he have any further issues he is to contact our office and get back in.  He will follow-up in 4 months or sooner if any changes.    If LDL is not within range with the Zetia he is willing to try the PK 9 inhibitors again if the pricing is better.    Keyon Olivas  reports that he quit smoking about 37 years ago. His smoking use included cigarettes. He started smoking about 55 years ago. He has a 15.00 pack-year smoking history. He has quit using smokeless tobacco.  His smokeless tobacco use included chew..        Patient's Body mass index is 28.41 kg/m². BMI is above normal parameters. Recommendations include: educational material and referral to primary care.      Electronically signed by:

## 2020-03-05 NOTE — PATIENT INSTRUCTIONS
Advance Care Planning and Advance Directives     You make decisions on a daily basis - decisions about where you want to live, your career, your home, your life. Perhaps one of the most important decisions you face is your choice for future medical care. Take time to talk with your family and your healthcare team and start planning today.  Advance Care Planning is a process that can help you:  · Understand possible future healthcare decisions in light of your own experiences  · Reflect on those decision in light of your goals and values  · Discuss your decisions with those closest to you and the healthcare professionals that care for you  · Make a plan by creating a document that reflects your wishes    Surrogate Decision Maker  In the event of a medical emergency, which has left you unable to communicate or to make your own decisions, you would need someone to make decisions for you.  It is important to discuss your preferences for medical treatment with this person while you are in good health.     Qualities of a surrogate decision maker:  • Willing to take on this role and responsibility  • Knows what you want for future medical care  • Willing to follow your wishes even if they don't agree with them  • Able to make difficult medical decisions under stressful circumstances    Advance Directives  These are legal documents you can create that will guide your healthcare team and decision maker(s) when needed. These documents can be stored in the electronic medical record.    · Living Will - a legal document to guide your care if you have a terminal condition or a serious illness and are unable to communicate. States vary by statute in document names/types, but most forms may include one or more of the following:        -  Directions regarding life-prolonging treatments        -  Directions regarding artificially provided nutrition/hydration        -  Choosing a healthcare decision maker        -  Direction  regarding organ/tissue donation    · Durable Power of  for Healthcare - this document names an -in-fact to make medical decisions for you, but it may also allow this person to make personal and financial decisions for you. Please seek the advice of an  if you need this type of document.    **Advance Directives are not required and no one may discriminate against you if you do not sign one.    Medical Orders  Many states allow specific forms/orders signed by your physician to record your wishes for medical treatment in your current state of health. This form, signed in personal communication with your physician, addresses resuscitation and other medical interventions that you may or may not want.      For more information or to schedule a time with a [facility] Advance Care Planning Facilitator contact: [facility] or call [phone] and someone will contact you directly.  Fat and Cholesterol Restricted Eating Plan  Getting too much fat and cholesterol in your diet may cause health problems. Choosing the right foods helps keep your fat and cholesterol at normal levels. This can keep you from getting certain diseases.  Your doctor may recommend an eating plan that includes:  · Total fat: ______% or less of total calories a day.  · Saturated fat: ______% or less of total calories a day.  · Cholesterol: less than _________mg a day.  · Fiber: ______g a day.  What are tips for following this plan?  Meal planning  · At meals, divide your plate into four equal parts:  ? Fill one-half of your plate with vegetables and green salads.  ? Fill one-fourth of your plate with whole grains.  ? Fill one-fourth of your plate with low-fat (lean) protein foods.  · Eat fish that is high in omega-3 fats at least two times a week. This includes mackerel, tuna, sardines, and salmon.  · Eat foods that are high in fiber, such as whole grains, beans, apples, broccoli, carrots, peas, and barley.  General  "tips    · Work with your doctor to lose weight if you need to.  · Avoid:  ? Foods with added sugar.  ? Fried foods.  ? Foods with partially hydrogenated oils.  · Limit alcohol intake to no more than 1 drink a day for nonpregnant women and 2 drinks a day for men. One drink equals 12 oz of beer, 5 oz of wine, or 1½ oz of hard liquor.  Reading food labels  · Check food labels for:  ? Trans fats.  ? Partially hydrogenated oils.  ? Saturated fat (g) in each serving.  ? Cholesterol (mg) in each serving.  ? Fiber (g) in each serving.  · Choose foods with healthy fats, such as:  ? Monounsaturated fats.  ? Polyunsaturated fats.  ? Omega-3 fats.  · Choose grain products that have whole grains. Look for the word \"whole\" as the first word in the ingredient list.  Cooking  · Cook foods using low-fat methods. These include baking, boiling, grilling, and broiling.  · Eat more home-cooked foods. Eat at restaurants and buffets less often.  · Avoid cooking using saturated fats, such as butter, cream, palm oil, palm kernel oil, and coconut oil.  Recommended foods    Fruits  · All fresh, canned (in natural juice), or frozen fruits.  Vegetables  · Fresh or frozen vegetables (raw, steamed, roasted, or grilled). Green salads.  Grains  · Whole grains, such as whole wheat or whole grain breads, crackers, cereals, and pasta. Unsweetened oatmeal, bulgur, barley, quinoa, or brown rice. Corn or whole wheat flour tortillas.  Meats and other protein foods  · Ground beef (85% or leaner), grass-fed beef, or beef trimmed of fat. Skinless chicken or turkey. Ground chicken or turkey. Pork trimmed of fat. All fish and seafood. Egg whites. Dried beans, peas, or lentils. Unsalted nuts or seeds. Unsalted canned beans. Nut butters without added sugar or oil.  Dairy  · Low-fat or nonfat dairy products, such as skim or 1% milk, 2% or reduced-fat cheeses, low-fat and fat-free ricotta or cottage cheese, or plain low-fat and nonfat yogurt.  Fats and oils  · Tub " margarine without trans fats. Light or reduced-fat mayonnaise and salad dressings. Avocado. Olive, canola, sesame, or safflower oils.  The items listed above may not be a complete list of foods and beverages you can eat. Contact a dietitian for more information.  Foods to avoid  Fruits  · Canned fruit in heavy syrup. Fruit in cream or butter sauce. Fried fruit.  Vegetables  · Vegetables cooked in cheese, cream, or butter sauce. Fried vegetables.  Grains  · White bread. White pasta. White rice. Cornbread. Bagels, pastries, and croissants. Crackers and snack foods that contain trans fat and hydrogenated oils.  Meats and other protein foods  · Fatty cuts of meat. Ribs, chicken wings, bledsoe, sausage, bologna, salami, chitterlings, fatback, hot dogs, bratwurst, and packaged lunch meats. Liver and organ meats. Whole eggs and egg yolks. Chicken and turkey with skin. Fried meat.  Dairy  · Whole or 2% milk, cream, half-and-half, and cream cheese. Whole milk cheeses. Whole-fat or sweetened yogurt. Full-fat cheeses. Nondairy creamers and whipped toppings. Processed cheese, cheese spreads, and cheese curds.  Beverages  · Alcohol. Sugar-sweetened drinks such as sodas, lemonade, and fruit drinks.  Fats and oils  · Butter, stick margarine, lard, shortening, ghee, or bledsoe fat. Coconut, palm kernel, and palm oils.  Sweets and desserts  · Corn syrup, sugars, honey, and molasses. Candy. Jam and jelly. Syrup. Sweetened cereals. Cookies, pies, cakes, donuts, muffins, and ice cream.  The items listed above may not be a complete list of foods and beverages you should avoid. Contact a dietitian for more information.  Summary  · Choosing the right foods helps keep your fat and cholesterol at normal levels. This can keep you from getting certain diseases.  · At meals, fill one-half of your plate with vegetables and green salads.  · Eat high-fiber foods, like whole grains, beans, apples, carrots, peas, and barley.  · Limit added sugar,  "saturated fats, alcohol, and fried foods.  This information is not intended to replace advice given to you by your health care provider. Make sure you discuss any questions you have with your health care provider.  Document Released: 06/18/2013 Document Revised: 08/21/2019 Document Reviewed: 09/04/2018  [employer] Interactive Patient Education © 2020 [employer]    BMI for Adults    Body mass index (BMI) is a number that is calculated from a person's weight and height. BMI may help to estimate how much of a person's weight is composed of fat. BMI can help identify those who may be at higher risk for certain medical problems.  How is BMI used with adults?  BMI is used as a screening tool to identify possible weight problems. It is used to check whether a person is obese, overweight, healthy weight, or underweight.  How is BMI calculated?  BMI measures your weight and compares it to your height. This can be done either in English (U[place] or metric measurements. Note that charts are available to help you find your BMI quickly and easily without having to do these calculations yourself.  To calculate your BMI in English ([place] measurements, your health care provider will:  1. Measure your weight in pounds (lb).  2. Multiply the number of pounds by 703.  ? For example, for a person who weighs 180 lb, multiply that number by 703, which equals 126,540.  3. Measure your height in inches (in). Then multiply that number by itself to get a measurement called \"inches squared.\"  ? For example, for a person who is 70 in tall, the \"inches squared\" measurement is 70 in x 70 in, which equals 4900 inches squared.  4. Divide the total from Step 2 (number of lb x 703) by the total from Step 3 (inches squared): 126,540 ÷ 4900 = 25.8. This is your BMI.  To calculate your BMI in metric measurements, your health care provider will:  1. Measure your weight in kilograms (kg).  2. Measure your height in meters (m). Then multiply that number by " "itself to get a measurement called \"meters squared.\"  ? For example, for a person who is 1.75 m tall, the \"meters squared\" measurement is 1.75 m x 1.75 m, which is equal to 3.1 meters squared.  3. Divide the number of kilograms (your weight) by the meters squared number. In this example: 70 ÷ 3.1 = 22.6. This is your BMI.  How is BMI interpreted?  To interpret your results, your health care provider will use BMI charts to identify whether you are underweight, normal weight, overweight, or obese. The following guidelines will be used:  · Underweight: BMI less than 18.5.  · Normal weight: BMI between 18.5 and 24.9.  · Overweight: BMI between 25 and 29.9.  · Obese: BMI of 30 and above.  Please note:  · Weight includes both fat and muscle, so someone with a muscular build, such as an athlete, may have a BMI that is higher than 24.9. In cases like these, BMI is not an accurate measure of body fat.  · To determine if excess body fat is the cause of a BMI of 25 or higher, further assessments may need to be done by a health care provider.  · BMI is usually interpreted in the same way for men and women.  Why is BMI a useful tool?  BMI is useful in two ways:  · Identifying a weight problem that may be related to a medical condition, or that may increase the risk for medical problems.  · Promoting lifestyle and diet changes in order to reach a healthy weight.  Summary  · Body mass index (BMI) is a number that is calculated from a person's weight and height.  · BMI may help to estimate how much of a person's weight is composed of fat. BMI can help identify those who may be at higher risk for certain medical problems.  · BMI can be measured using English measurements or metric measurements.  · To interpret your results, your health care provider will use BMI charts to identify whether you are underweight, normal weight, overweight, or obese.  This information is not intended to replace advice given to you by your health care " provider. Make sure you discuss any questions you have with your health care provider.  Document Released: 08/29/2005 Document Revised: 10/31/2018 Document Reviewed: 10/31/2018  [employer] Interactive Patient Education © 2020 [employer]

## 2021-02-13 DIAGNOSIS — I10 ESSENTIAL HYPERTENSION: ICD-10-CM

## 2021-02-15 RX ORDER — AMLODIPINE BESYLATE 5 MG/1
TABLET ORAL
Qty: 30 TABLET | Refills: 10 | Status: SHIPPED | OUTPATIENT
Start: 2021-02-15 | End: 2022-02-16 | Stop reason: SDUPTHER

## 2022-02-16 ENCOUNTER — TELEPHONE (OUTPATIENT)
Dept: CARDIOLOGY | Facility: CLINIC | Age: 77
End: 2022-02-16

## 2022-02-16 DIAGNOSIS — I10 ESSENTIAL HYPERTENSION: ICD-10-CM

## 2022-02-16 RX ORDER — LISINOPRIL 10 MG/1
10 TABLET ORAL DAILY
Qty: 30 TABLET | Refills: 3 | Status: SHIPPED | OUTPATIENT
Start: 2022-02-16 | End: 2022-08-23 | Stop reason: SDUPTHER

## 2022-02-16 RX ORDER — AMLODIPINE BESYLATE 5 MG/1
5 TABLET ORAL DAILY
Qty: 30 TABLET | Refills: 3 | Status: SHIPPED | OUTPATIENT
Start: 2022-02-16 | End: 2023-02-07 | Stop reason: ALTCHOICE

## 2022-02-16 NOTE — TELEPHONE ENCOUNTER
Pt called and says he needs a refill on lisinopril/amlodipine. Has not been seen since 2020. I spoke with him denies any cardiac sx, and is aware he needs a follow up to continue refilling medications. Once scheduled, will send in medications til next kimberlyn.    Can we please schedule?

## 2022-06-22 ENCOUNTER — OFFICE VISIT (OUTPATIENT)
Dept: CARDIOLOGY | Facility: CLINIC | Age: 77
End: 2022-06-22

## 2022-06-22 VITALS
HEART RATE: 68 BPM | SYSTOLIC BLOOD PRESSURE: 118 MMHG | OXYGEN SATURATION: 98 % | BODY MASS INDEX: 26.26 KG/M2 | HEIGHT: 66 IN | DIASTOLIC BLOOD PRESSURE: 75 MMHG | WEIGHT: 163.4 LBS

## 2022-06-22 DIAGNOSIS — E78.5 DYSLIPIDEMIA: ICD-10-CM

## 2022-06-22 DIAGNOSIS — I10 PRIMARY HYPERTENSION: ICD-10-CM

## 2022-06-22 DIAGNOSIS — R55 SYNCOPE AND COLLAPSE: ICD-10-CM

## 2022-06-22 DIAGNOSIS — I25.810 CORONARY ARTERY DISEASE INVOLVING CORONARY BYPASS GRAFT OF NATIVE HEART WITHOUT ANGINA PECTORIS: Primary | ICD-10-CM

## 2022-06-22 DIAGNOSIS — G47.33 OSA (OBSTRUCTIVE SLEEP APNEA): ICD-10-CM

## 2022-06-22 DIAGNOSIS — R06.02 SHORTNESS OF BREATH: ICD-10-CM

## 2022-06-22 PROBLEM — G62.9 NEUROPATHY: Status: ACTIVE | Noted: 2019-01-11

## 2022-06-22 PROCEDURE — 99214 OFFICE O/P EST MOD 30 MIN: CPT | Performed by: NURSE PRACTITIONER

## 2022-06-22 PROCEDURE — 93000 ELECTROCARDIOGRAM COMPLETE: CPT | Performed by: NURSE PRACTITIONER

## 2022-06-22 RX ORDER — NITROGLYCERIN 0.4 MG/1
0.4 TABLET SUBLINGUAL
Qty: 35 TABLET | Refills: 5 | Status: SHIPPED | OUTPATIENT
Start: 2022-06-22

## 2022-06-22 NOTE — PROGRESS NOTES
Subjective   Keyon Olivas is a 76 y.o. male     Chief Complaint   Patient presents with   • Coronary Artery Disease       HPI    PROBLEM LIST:     1. Coronary artery disease   1.1 CABG x 3, 05/11/2000;  LIMA to LAD.  Left radial artery to RCA.  Left radial artery as T graft to LIMA/OM2  1.2 cath august 2006, stenting to RCA and OM2  1.3 Stress test 11/1/17-no ischemia, preserved LVEF  1.4 stress test 2/27/2020-mild diaphragmatic ischemia, post-rest EF 52%, no evidence of pharmacologically induced transient ischemic dilation  2. Hypertension   3. Dyslipidemia   3.1 intolerant to Crestor 2012, Lipitor, simvastatin, Lescol, niacin and Zetia  4. H/o hep. C; followed by Dr. Cash   5. Dyspnea on Exertion   5.1 Echo 11/1/17-EF greater than 65%, diastolic dysfunction 1, mild MR, mild TR, WI, PA 30-35  5.2 echo 2/27/2020-EF 50%, mild LVH, diastolic dysfunction 1, moderate to severe left atrial large amount, mild to moderate MR, mild TR, PA in the 30s  6.  DEACON, CPAP    Patient is a 76-year-old male who presents today for a follow-up.  He denies any chest pain, pressure, palpitations, fluttering, orthopnea, PND or edema.  Patient says he has had at least 3 episodes where he has been up and he just blacks out.  He says he has no dizziness, he does not feel like his blood sleeving his body nor does he feel any palpitations.  He says he will raise his arms and he he will just be able to tell he is going to pass out.  He says once he hits the ground he is not on the ground very long.  He does know who he is and where he is when he comes to and has had no loss of bowel or bladder.  Patient says he does get shortness of breath but only if he overdoes it.  He says he will get fatigued easier he can still carry the same weight but he can only carry it for short distance.  Patient did have blood work with PCP.  I asked him why he was not taking any medication for cholesterol he says his cholesterol was within normal range.  I educated  patient on purpose of needing cholesterol medication but we requested labs from PCP nonetheless.    Received labs after patient left which I will discuss with him at follow-up.  His creatinine was 1.1, K4.2, LDL was 103 and triglycerides were 188.      Current Outpatient Medications on File Prior to Visit   Medication Sig Dispense Refill   • ALPRAZolam (XANAX) 1 MG tablet Take 1 mg by mouth Every Night.     • amLODIPine (NORVASC) 5 MG tablet Take 1 tablet by mouth Daily. 30 tablet 3   • aspirin 325 MG tablet Take 325 mg by mouth Daily.     • lisinopril (PRINIVIL,ZESTRIL) 10 MG tablet Take 1 tablet by mouth Daily. 30 tablet 3   • zolpidem (AMBIEN) 10 MG tablet Take 10 mg by mouth At Night As Needed for Sleep.     • [DISCONTINUED] metoprolol tartrate (LOPRESSOR) 25 MG tablet Take 25 mg by mouth 2 (Two) Times a Day.     • [DISCONTINUED] ezetimibe (ZETIA) 10 MG tablet Take 1 tablet by mouth Daily. 90 tablet 3   • [DISCONTINUED] gabapentin (NEURONTIN) 600 MG tablet Take 600 mg by mouth 3 (Three) Times a Day.     • [DISCONTINUED] metoprolol tartrate (LOPRESSOR) 25 MG tablet Take 0.5 tablets by mouth 2 (Two) Times a Day. (Patient taking differently: Take 25 mg by mouth 2 (Two) Times a Day.) 180 tablet 3   • [DISCONTINUED] nitroglycerin (NITROSTAT) 0.4 MG SL tablet Place 1 tablet under the tongue Every 5 (Five) Minutes As Needed for Chest Pain. Take no more than 3 doses in 15 minutes. 35 tablet 5     No current facility-administered medications on file prior to visit.       ALLERGIES    Crestor [rosuvastatin], Lescol [fluvastatin sodium], Lipitor [atorvastatin], Niacin and related, and Penicillins    Past Medical History:   Diagnosis Date   • Anxiety and depression 03/09/2017   • BPH (benign prostatic hyperplasia) 03/09/2017   • CAD (coronary artery disease) 03/09/2017   • DJD (degenerative joint disease) 03/09/2017   • Dyslipidemia 03/09/2017   • Hepatitis C     History of hepatitis C; followed by Dr. Cash in [place].   •  Hyperlipidemia    • Hypertension 2017   • Insomnia 2017   • Neuropathy    • DEACON (obstructive sleep apnea) 2017   • Poor short term memory 2017   • Small fiber neuropathy    • Ulcerative colitis (HCC) 2017       Social History     Socioeconomic History   • Marital status:    Tobacco Use   • Smoking status: Former Smoker     Packs/day: 1.00     Years: 15.00     Pack years: 15.00     Types: Cigarettes     Start date: 1965     Quit date: 1983     Years since quittin.4   • Smokeless tobacco: Former User     Types: Chew   • Tobacco comment: smoked 10-20 years, < last 1 PPD   Substance and Sexual Activity   • Alcohol use: No   • Drug use: No   • Sexual activity: Yes     Partners: Female     Birth control/protection: None       Family History   Problem Relation Age of Onset   • Emphysema Mother    • Heart failure Mother    • Heart attack Father    • Memory loss Brother        Review of Systems   Constitutional: Positive for fatigue (does feel a decline, can only carry about 2 ft, but can carry same weight ). Negative for chills, diaphoresis and fever.   HENT: Positive for hearing loss. Negative for congestion and sinus pressure.    Eyes: Positive for visual disturbance (glasses prn).   Respiratory: Positive for shortness of breath (only if he over does it ). Negative for chest tightness.    Cardiovascular: Negative for chest pain, palpitations and leg swelling.   Gastrointestinal: Negative for abdominal pain, blood in stool, constipation, diarrhea, nausea and vomiting.   Endocrine: Positive for cold intolerance. Negative for heat intolerance.   Genitourinary: Negative for dysuria, frequency, hematuria and urgency.   Musculoskeletal: Positive for back pain (low back pain, chronic) and gait problem (reports he has been having falls). Negative for neck pain.   Skin: Positive for wound (bruises on arms).   Allergic/Immunologic: Negative for environmental allergies and food  "allergies.   Neurological: Positive for dizziness (when first getting up in the mornings, sits on side of bed a few minutes before getting up) and syncope (no dizzy, just black out, just knows he is going; no confussion, no loss of bowel and bladder; has occured 3 x ). Negative for light-headedness.   Hematological: Bruises/bleeds easily.   Psychiatric/Behavioral: Negative for sleep disturbance (denies waking with soa or cp).       Objective   /75 (BP Location: Right arm, Patient Position: Sitting)   Pulse 68   Ht 167.6 cm (66\")   Wt 74.1 kg (163 lb 6.4 oz)   SpO2 98%   BMI 26.37 kg/m²   Vitals:    06/22/22 1305 06/22/22 1348   BP: 100/67 118/75   BP Location: Right arm Right arm   Patient Position: Sitting Sitting   Pulse: 88 68   SpO2: 98%    Weight: 74.1 kg (163 lb 6.4 oz)    Height: 167.6 cm (66\")       Lab Results (most recent)     None        Physical Exam  Vitals reviewed.   Constitutional:       General: He is awake.      Appearance: Normal appearance. He is well-developed, well-groomed and overweight.   HENT:      Head: Normocephalic.      Right Ear: Decreased hearing noted.      Left Ear: Decreased hearing noted.   Eyes:      General: Lids are normal.      Comments: Wears glasses    Neck:      Vascular: No carotid bruit, hepatojugular reflux or JVD.   Cardiovascular:      Rate and Rhythm: Normal rate and regular rhythm.      Pulses:           Radial pulses are 2+ on the right side.        Dorsalis pedis pulses are 2+ on the right side and 2+ on the left side.        Posterior tibial pulses are 2+ on the right side and 2+ on the left side.      Heart sounds: Normal heart sounds.   Pulmonary:      Effort: Pulmonary effort is normal.      Breath sounds: Normal breath sounds and air entry.   Abdominal:      General: Bowel sounds are normal.      Palpations: Abdomen is soft.   Musculoskeletal:      Right lower leg: No edema.      Left lower leg: No edema.   Skin:     General: Skin is warm and dry.    "   Comments: Scar LFA    Neurological:      Mental Status: He is alert and oriented to person, place, and time.   Psychiatric:         Attention and Perception: Attention and perception normal.         Mood and Affect: Mood and affect normal.         Speech: Speech normal.         Behavior: Behavior normal. Behavior is cooperative.         Thought Content: Thought content normal.         Cognition and Memory: Cognition and memory normal.         Judgment: Judgment normal.         Procedure     ECG 12 Lead    Date/Time: 6/22/2022 1:25 PM  Performed by: Christianne Shah APRN  Authorized by: Christianne Shah APRN   Comparison: compared with previous ECG from 1/28/2020  Comparison to previous ECG: Previously had premature beats  Rhythm: sinus rhythm  Rate: normal  BPM: 76  QRS axis: normal    Clinical impression: normal ECG                 Assessment & Plan      Diagnosis Plan   1. Coronary artery disease involving coronary bypass graft of native heart without angina pectoris  ECG 12 Lead    metoprolol tartrate (LOPRESSOR) 25 MG tablet    Stress Test With Myocardial Perfusion One Day    Adult Transthoracic Echo Complete W/ Cont if Necessary Per Protocol    nitroglycerin (NITROSTAT) 0.4 MG SL tablet   2. Primary hypertension  ECG 12 Lead    metoprolol tartrate (LOPRESSOR) 25 MG tablet    Stress Test With Myocardial Perfusion One Day    Adult Transthoracic Echo Complete W/ Cont if Necessary Per Protocol   3. Dyslipidemia  Stress Test With Myocardial Perfusion One Day   4. Shortness of breath  Stress Test With Myocardial Perfusion One Day    Adult Transthoracic Echo Complete W/ Cont if Necessary Per Protocol   5. DEACON (obstructive sleep apnea)  Adult Transthoracic Echo Complete W/ Cont if Necessary Per Protocol   6. Syncope and collapse  Stress Test With Myocardial Perfusion One Day    Adult Transthoracic Echo Complete W/ Cont if Necessary Per Protocol    Cardiac Event Monitor    Duplex Carotid Ultrasound CAR       Return in  about 12 weeks (around 9/14/2022).    CAD/hypertension/dyslipidemia/shortness of breath/DEACON/syncope-patient have an ischemia work-up, stress and echo.  Patient will wear an event monitor for 2 weeks.  He will have a carotid artery ultrasound.  CAD-patient's on aspirin and beta.  Hypertension-patient's on amlodipine, lisinopril, Toprol.  I decrease his metoprolol again which I did in the past but he does not know why he went back to the full tablet.  He had heart rates in the 40s previously.  He is going to start the metoprolol half a tab twice a day to allow for better blood pressure and also to allow his heart rate not to go so low.  Patient will follow-up in 12 weeks or sooner if any changes or abnormalities with testing.       Keyon Olivas  reports that he quit smoking about 39 years ago. His smoking use included cigarettes. He started smoking about 57 years ago. He has a 15.00 pack-year smoking history. He has quit using smokeless tobacco.  His smokeless tobacco use included chew.. I have educated him on the risk of diseases from using tobacco products such as cancer, COPD and heart disease.     Patient brought in medicine list to appointment, it's been reviewed with patient and med list was updated in the chart.     Advance Care Planning   ACP discussion was held with the patient during this visit. Patient does not have an advance directive, information provided.                Electronically signed by:

## 2022-06-30 ENCOUNTER — APPOINTMENT (OUTPATIENT)
Dept: CARDIOLOGY | Facility: HOSPITAL | Age: 77
End: 2022-06-30

## 2022-07-05 ENCOUNTER — HOSPITAL ENCOUNTER (OUTPATIENT)
Dept: CARDIOLOGY | Facility: HOSPITAL | Age: 77
Discharge: HOME | End: 2022-07-05

## 2022-07-05 ENCOUNTER — APPOINTMENT (OUTPATIENT)
Dept: CARDIOLOGY | Facility: HOSPITAL | Age: 77
End: 2022-07-05

## 2022-07-05 DIAGNOSIS — R55 SYNCOPE AND COLLAPSE: ICD-10-CM

## 2022-07-05 DIAGNOSIS — I25.810 CORONARY ARTERY DISEASE INVOLVING CORONARY BYPASS GRAFT OF NATIVE HEART WITHOUT ANGINA PECTORIS: ICD-10-CM

## 2022-07-05 DIAGNOSIS — G47.33 OSA (OBSTRUCTIVE SLEEP APNEA): ICD-10-CM

## 2022-07-05 DIAGNOSIS — R06.02 SHORTNESS OF BREATH: ICD-10-CM

## 2022-07-05 DIAGNOSIS — E78.5 DYSLIPIDEMIA: ICD-10-CM

## 2022-07-05 DIAGNOSIS — I10 PRIMARY HYPERTENSION: ICD-10-CM

## 2022-07-05 PROCEDURE — 0 TECHNETIUM SESTAMIBI: Performed by: INTERNAL MEDICINE

## 2022-07-05 PROCEDURE — A9500 TC99M SESTAMIBI: HCPCS | Performed by: INTERNAL MEDICINE

## 2022-07-05 PROCEDURE — 93306 TTE W/DOPPLER COMPLETE: CPT

## 2022-07-05 PROCEDURE — 93018 CV STRESS TEST I&R ONLY: CPT | Performed by: INTERNAL MEDICINE

## 2022-07-05 PROCEDURE — 78452 HT MUSCLE IMAGE SPECT MULT: CPT

## 2022-07-05 PROCEDURE — 25010000002 REGADENOSON 0.4 MG/5ML SOLUTION: Performed by: INTERNAL MEDICINE

## 2022-07-05 PROCEDURE — 93017 CV STRESS TEST TRACING ONLY: CPT

## 2022-07-05 PROCEDURE — 78452 HT MUSCLE IMAGE SPECT MULT: CPT | Performed by: INTERNAL MEDICINE

## 2022-07-05 PROCEDURE — 93306 TTE W/DOPPLER COMPLETE: CPT | Performed by: INTERNAL MEDICINE

## 2022-07-05 RX ADMIN — TECHNETIUM TC 99M SESTAMIBI 1 DOSE: 1 INJECTION INTRAVENOUS at 08:35

## 2022-07-05 RX ADMIN — TECHNETIUM TC 99M SESTAMIBI 1 DOSE: 1 INJECTION INTRAVENOUS at 10:57

## 2022-07-05 RX ADMIN — REGADENOSON 0.4 MG: 0.08 INJECTION, SOLUTION INTRAVENOUS at 10:57

## 2022-07-06 LAB
BH CV ECHO MEAS - ACS: 1.93 CM
BH CV ECHO MEAS - AO MAX PG: 4.7 MMHG
BH CV ECHO MEAS - AO MEAN PG: 2.9 MMHG
BH CV ECHO MEAS - AO ROOT DIAM: 3.6 CM
BH CV ECHO MEAS - AO V2 MAX: 108.6 CM/SEC
BH CV ECHO MEAS - AO V2 VTI: 26 CM
BH CV ECHO MEAS - EDV(CUBED): 90.5 ML
BH CV ECHO MEAS - EDV(MOD-SP4): 59.3 ML
BH CV ECHO MEAS - EF(MOD-SP4): 56.2 %
BH CV ECHO MEAS - EF_3D-VOL: 64 %
BH CV ECHO MEAS - ESV(CUBED): 22.9 ML
BH CV ECHO MEAS - ESV(MOD-SP4): 26 ML
BH CV ECHO MEAS - FS: 36.7 %
BH CV ECHO MEAS - IVS/LVPW: 0.99 CM
BH CV ECHO MEAS - IVSD: 1.01 CM
BH CV ECHO MEAS - LA DIMENSION: 4.4 CM
BH CV ECHO MEAS - LAT PEAK E' VEL: 11.1 CM/SEC
BH CV ECHO MEAS - LV DIASTOLIC VOL/BSA (35-75): 32.3 CM2
BH CV ECHO MEAS - LV MASS(C)D: 155.9 GRAMS
BH CV ECHO MEAS - LV SYSTOLIC VOL/BSA (12-30): 14.2 CM2
BH CV ECHO MEAS - LVIDD: 4.5 CM
BH CV ECHO MEAS - LVIDS: 2.8 CM
BH CV ECHO MEAS - LVOT AREA: 3.2 CM2
BH CV ECHO MEAS - LVOT DIAM: 2.01 CM
BH CV ECHO MEAS - LVPWD: 1.02 CM
BH CV ECHO MEAS - MED PEAK E' VEL: 5.9 CM/SEC
BH CV ECHO MEAS - MV A MAX VEL: 51.8 CM/SEC
BH CV ECHO MEAS - MV DEC SLOPE: 259.2 CM/SEC2
BH CV ECHO MEAS - MV E MAX VEL: 59.1 CM/SEC
BH CV ECHO MEAS - MV E/A: 1.14
BH CV ECHO MEAS - RAP SYSTOLE: 10 MMHG
BH CV ECHO MEAS - RVDD: 3.1 CM
BH CV ECHO MEAS - RVSP: 31.6 MMHG
BH CV ECHO MEAS - SI(MOD-SP4): 18.2 ML/M2
BH CV ECHO MEAS - SV(MOD-SP4): 33.3 ML
BH CV ECHO MEAS - TR MAX PG: 21.6 MMHG
BH CV ECHO MEAS - TR MAX VEL: 232.1 CM/SEC
BH CV ECHO MEASUREMENTS AVERAGE E/E' RATIO: 6.95
BH CV REST NUCLEAR ISOTOPE DOSE: 10 MCI
BH CV STRESS COMMENTS STAGE 1: NORMAL
BH CV STRESS DOSE REGADENOSON STAGE 1: 0.4
BH CV STRESS DURATION MIN STAGE 1: 0
BH CV STRESS DURATION SEC STAGE 1: 10
BH CV STRESS NUCLEAR ISOTOPE DOSE: 30 MCI
BH CV STRESS PROTOCOL 1: NORMAL
BH CV STRESS RECOVERY BP: NORMAL MMHG
BH CV STRESS RECOVERY HR: 65 BPM
BH CV STRESS STAGE 1: 1
LEFT ATRIUM VOLUME INDEX: 35.5 ML/M2
MAXIMAL PREDICTED HEART RATE: 146 BPM
MAXIMAL PREDICTED HEART RATE: 146 BPM
PERCENT MAX PREDICTED HR: 49.32 %
STRESS BASELINE BP: NORMAL MMHG
STRESS BASELINE HR: 48 BPM
STRESS PERCENT HR: 58 %
STRESS POST PEAK BP: NORMAL MMHG
STRESS POST PEAK HR: 72 BPM
STRESS TARGET HR: 124 BPM
STRESS TARGET HR: 124 BPM

## 2022-07-07 ENCOUNTER — TELEPHONE (OUTPATIENT)
Dept: CARDIOLOGY | Facility: CLINIC | Age: 77
End: 2022-07-07

## 2022-07-07 NOTE — TELEPHONE ENCOUNTER
----- Message from LISSET Pandey sent at 7/6/2022  9:07 PM EDT -----  Regarding: FW:  Please get patient in within 1 week. Thanks!  ----- Message -----  From: Juan C Samaniego MD  Sent: 7/6/2022   7:36 PM EDT  To: LISSET Pandey

## 2022-07-07 NOTE — TELEPHONE ENCOUNTER
Scheduled pt for 7/13 at 9:45am      First attempt to reach pt. Left a voicemail for pt to return my call at [phone].

## 2022-07-07 NOTE — TELEPHONE ENCOUNTER
Stress:  1.  Scintigraphic images demonstrate a moderate to large sized, dense, but only partially reversible defect involving the inferior and inferolateral walls compatible with ischemia.     2.  Preserved post-rest ejection fraction of 59% with no focal wall motion abnormalities.     3.  No evidence of pharmacologically induced transient ischemic dilation or of increased lung uptake of radiopharmaceutical.      Echo:  1.  LV size, function, wall motion, and wall thickness are normal with LV wall thickness at the upper limits of normal.  Visually estimated ejection fraction is 55 to 60%.  3D ejection fraction is 64%.  Grade 2 diastolic dysfunction.  Moderate left atrial and mild right atrial enlargement.  RV size and function are grossly preserved.  No septal defect or intracavitary mass or thrombus.     2.  The mitral valve is morphologically normal with no mitral stenosis and with trivial to mild MR.  The aortic valve is tricuspid and sclerotic but not heavily calcified nor stenotic.  There is trivial AI.  Mild TR is identified from a morphologically normal valve.     3.  No pericardial or great vessel pathology.  The proximal aortic root is at the upper limits of normal size.     4.  Pulmonary artery systolic pressures are estimated in the low 30s.

## 2022-07-08 NOTE — TELEPHONE ENCOUNTER
Second attempt to reach pt. Left a voicemail for pt to return my call at [phone]. Stated on VM that phone line will go off at 1130am today and reopen Monday at 8am.

## 2022-07-13 ENCOUNTER — OFFICE VISIT (OUTPATIENT)
Dept: CARDIOLOGY | Facility: CLINIC | Age: 77
End: 2022-07-13

## 2022-07-13 VITALS
OXYGEN SATURATION: 98 % | WEIGHT: 163 LBS | TEMPERATURE: 97.6 F | BODY MASS INDEX: 26.2 KG/M2 | SYSTOLIC BLOOD PRESSURE: 107 MMHG | HEIGHT: 66 IN | DIASTOLIC BLOOD PRESSURE: 65 MMHG | HEART RATE: 82 BPM

## 2022-07-13 DIAGNOSIS — R94.39 ABNORMAL STRESS TEST: Primary | ICD-10-CM

## 2022-07-13 DIAGNOSIS — R06.02 SHORTNESS OF BREATH: ICD-10-CM

## 2022-07-13 DIAGNOSIS — E78.5 DYSLIPIDEMIA: ICD-10-CM

## 2022-07-13 DIAGNOSIS — R42 DIZZINESS: ICD-10-CM

## 2022-07-13 DIAGNOSIS — I25.810 CORONARY ARTERY DISEASE INVOLVING CORONARY BYPASS GRAFT OF NATIVE HEART WITHOUT ANGINA PECTORIS: ICD-10-CM

## 2022-07-13 DIAGNOSIS — G47.33 OSA (OBSTRUCTIVE SLEEP APNEA): ICD-10-CM

## 2022-07-13 DIAGNOSIS — R53.81 MALAISE AND FATIGUE: ICD-10-CM

## 2022-07-13 DIAGNOSIS — I10 PRIMARY HYPERTENSION: ICD-10-CM

## 2022-07-13 DIAGNOSIS — R53.83 MALAISE AND FATIGUE: ICD-10-CM

## 2022-07-13 PROCEDURE — 99214 OFFICE O/P EST MOD 30 MIN: CPT | Performed by: NURSE PRACTITIONER

## 2022-07-13 NOTE — PROGRESS NOTES
Subjective   Keyon Olivas is a 76 y.o. male     Chief Complaint   Patient presents with   • Follow-up       HPI    PROBLEM LIST:     1. Coronary artery disease   1.1 CABG x 3, 05/11/2000;  LIMA to LAD, Left radial artery to RCA. Left radial artery as T graft to LIMA/OM2  1.2 cath august 2006, stenting to RCA and OM2  1.3 Stress test 7/5/2022-moderate to large size dense, but only partially reversible defect involving the inferior and inferolateral wall compatible with ischemia, post-rest EF 59%  2. Hypertension   3. Dyslipidemia   3.1 intolerant to Crestor 2012, Lipitor, simvastatin, Lescol, niacin and Zetia  4. H/o hep. C; followed by Dr. Cash   5. Dyspnea on Exertion   5.1 Echo 2/27/2020-EF 50%, mild LVH, diastolic dysfunction 1, moderate to severe left atrial large amount, mild to moderate MR, mild TR, PA in the 30s  5.2 Echo 7/5/2022-EF 55 to 60%, diastolic dysfunction 2, trivial to mild MR, mild TR, PA in the low 30s  6.  DEACON, CPAP  7. Event Monitor 6/22 - 7/5/2022 - NSR, PAC, Sinus Arrhythmia     Patient is a 76-year-old male who presents today for follow-up on testing.  Patient denies any chest pain, pressure, palpitations, fluttering, presyncope, syncope, orthopnea, PND or edema.  Patient says that he does have some left arm pain which is what he had prior to his CABG/stents and he is unsure if this is the same kind of pain as he had before.  He does have dizziness and lightheadedness if he changes position quickly.  He says this is just on occasion.  He said he has had increasing shortness of breath and fatigue which is what concerned him to begin with.  Again patient has never actually had chest pain when he has had any previous stents and/or CABG.    We went over stress, echo and event.    Current Outpatient Medications on File Prior to Visit   Medication Sig Dispense Refill   • ALPRAZolam (XANAX) 1 MG tablet Take 1 mg by mouth Every Night.     • amLODIPine (NORVASC) 5 MG tablet Take 1 tablet by mouth  Daily. 30 tablet 3   • aspirin 325 MG tablet Take 325 mg by mouth Daily.     • lisinopril (PRINIVIL,ZESTRIL) 10 MG tablet Take 1 tablet by mouth Daily. 30 tablet 3   • metoprolol tartrate (LOPRESSOR) 25 MG tablet Take 0.5 tablets by mouth 2 (Two) Times a Day. 60 tablet 11   • nitroglycerin (NITROSTAT) 0.4 MG SL tablet Place 1 tablet under the tongue Every 5 (Five) Minutes As Needed for Chest Pain. Take no more than 3 doses in 15 minutes. 35 tablet 5   • zolpidem (AMBIEN) 10 MG tablet Take 10 mg by mouth At Night As Needed for Sleep.       No current facility-administered medications on file prior to visit.       ALLERGIES    Crestor [rosuvastatin], Lescol [fluvastatin sodium], Lipitor [atorvastatin], Niacin and related, and Penicillins    Past Medical History:   Diagnosis Date   • Anxiety and depression 2017   • BPH (benign prostatic hyperplasia) 2017   • CAD (coronary artery disease) 2017   • COVID-19     2022   • COVID-19 vaccine administered     2021, 2021, 03/10/2022 - Moderna   • DJD (degenerative joint disease) 2017   • Dyslipidemia 2017   • Hepatitis C     History of hepatitis C; followed by Dr. Cash in [place].   • Hyperlipidemia    • Hypertension 2017   • Insomnia 2017   • Neuropathy    • DEACON (obstructive sleep apnea) 2017   • Poor short term memory 2017   • Small fiber neuropathy    • Ulcerative colitis (HCC) 2017       Social History     Socioeconomic History   • Marital status:    Tobacco Use   • Smoking status: Former Smoker     Packs/day: 1.00     Years: 15.00     Pack years: 15.00     Types: Cigarettes     Start date: 1965     Quit date: 1983     Years since quittin.5   • Smokeless tobacco: Former User     Types: Chew   • Tobacco comment: smoked 10-20 years, < last 1 PPD   Vaping Use   • Vaping Use: Never used   Substance and Sexual Activity   • Alcohol use: No   • Drug use: No   • Sexual activity: Yes      "Partners: Female     Birth control/protection: None       Family History   Problem Relation Age of Onset   • Emphysema Mother    • Heart failure Mother    • Heart attack Father    • Memory loss Brother        Review of Systems   Constitutional: Positive for fatigue (fatigued all the time; no get up and go ). Negative for appetite change, chills, diaphoresis and fever.   HENT: Negative for congestion, rhinorrhea and sore throat.    Eyes: Positive for visual disturbance (redaing glasses ).   Respiratory: Positive for shortness of breath (with any type of activity; gets short of breath easily ). Negative for cough, chest tightness and wheezing.    Cardiovascular: Negative for chest pain, palpitations and leg swelling.   Gastrointestinal: Negative for abdominal pain, blood in stool, constipation, diarrhea, nausea and vomiting.   Endocrine: Negative for cold intolerance and heat intolerance.   Genitourinary: Negative for difficulty urinating, dysuria, frequency, hematuria and urgency.   Musculoskeletal: Positive for arthralgias (ankles ) and back pain (lower back ). Negative for joint swelling, neck pain and neck stiffness.        Left arm pain, but not sure if it is the same as what he had when stents/cabg   Skin: Negative for color change, pallor, rash and wound.   Allergic/Immunologic: Negative for environmental allergies and food allergies.   Neurological: Positive for dizziness (getting up to fast and with any change in positions ) and light-headedness (goes along with the dizziness ). Negative for weakness, numbness and headaches.   Hematological: Bruises/bleeds easily (Bruises and bleeds due to ASA ).   Psychiatric/Behavioral: Negative for sleep disturbance.       Objective   /65 (BP Location: Left arm, Patient Position: Sitting)   Pulse 82   Temp 97.6 °F (36.4 °C)   Ht 167.6 cm (66\")   Wt 73.9 kg (163 lb)   SpO2 98%   BMI 26.31 kg/m²   Vitals:    07/13/22 0958   BP: 107/65   BP Location: Left arm " "  Patient Position: Sitting   Pulse: 82   Temp: 97.6 °F (36.4 °C)   SpO2: 98%   Weight: 73.9 kg (163 lb)   Height: 167.6 cm (66\")      Lab Results (most recent)     None        Physical Exam  Vitals reviewed.   Constitutional:       General: He is awake.      Appearance: Normal appearance. He is well-developed, well-groomed and overweight.   HENT:      Head: Normocephalic.   Eyes:      General: Lids are normal.      Comments: Wears glasses    Neck:      Vascular: No carotid bruit, hepatojugular reflux or JVD.   Cardiovascular:      Rate and Rhythm: Normal rate and regular rhythm.      Pulses:           Radial pulses are 2+ on the left side. Right radial pulse not accessible.        Dorsalis pedis pulses are 2+ on the right side and 2+ on the left side.        Posterior tibial pulses are 2+ on the right side and 2+ on the left side.      Heart sounds: Normal heart sounds.   Pulmonary:      Effort: Pulmonary effort is normal.      Breath sounds: Normal breath sounds and air entry.   Abdominal:      General: Bowel sounds are normal.      Palpations: Abdomen is soft.   Musculoskeletal:      Right lower leg: No edema.      Left lower leg: No edema.   Skin:     General: Skin is warm and dry.   Neurological:      Mental Status: He is alert and oriented to person, place, and time.   Psychiatric:         Attention and Perception: Attention and perception normal.         Mood and Affect: Mood and affect normal.         Speech: Speech normal.         Behavior: Behavior normal. Behavior is cooperative.         Thought Content: Thought content normal.         Cognition and Memory: Cognition and memory normal.         Judgment: Judgment normal.         Procedure   Procedures         Assessment & Plan      Diagnosis Plan   1. Abnormal stress test  [facility]   2. Primary hypertension  [facility]    CBC (No Diff)    Basic Metabolic Panel   3. Dyslipidemia  [facility]   4. Coronary artery disease " involving coronary bypass graft of native heart without angina pectoris  [facility]   5. Shortness of breath  [facility]   6. DEACON (obstructive sleep apnea)     7. Dizziness     8. Malaise and fatigue  [facility]       Return 2-4 weeks after LHC.    Abnormal stress test/hypertension/dyslipidemia/CAD/shortness of breath/malaise and fatigue-patient will have a left heart cath.  He will get a CBC and BMP prior.  DEACON-patient is compliant with CPAP.  Dizziness-stable.  He will continue his medication regimen.  He will follow-up 2 to 4 weeks after heart cath or sooner if any changes.       Keyon Olivas  reports that he quit smoking about 39 years ago. His smoking use included cigarettes. He started smoking about 57 years ago. He has a 15.00 pack-year smoking history. He has quit using smokeless tobacco.  His smokeless tobacco use included chew..Patient did not bring med list or medicine bottles to appointment, med list has been reviewed and updated based on patient's knowledge of their meds.        Advance Care Planning   ACP discussion was declined by the patient. Patient has an advance directive (not in EMR), copy requested.         Electronically signed by:

## 2022-07-14 ENCOUNTER — TELEPHONE (OUTPATIENT)
Dept: CARDIOLOGY | Facility: CLINIC | Age: 77
End: 2022-07-14

## 2022-07-14 NOTE — TELEPHONE ENCOUNTER
Patient called in to see if his brother could bring him and drop him off for his procedure and come back and get him afterwards.  I advised him yes that is acceptable.    Ayana MITTAL

## 2022-08-04 ENCOUNTER — HOSPITAL ENCOUNTER (OUTPATIENT)
Dept: CARDIOLOGY | Facility: HOSPITAL | Age: 77
Discharge: HOME | End: 2022-08-04

## 2022-08-04 DIAGNOSIS — R55 SYNCOPE AND COLLAPSE: ICD-10-CM

## 2022-08-04 PROCEDURE — 93880 EXTRACRANIAL BILAT STUDY: CPT

## 2022-08-04 PROCEDURE — 93880 EXTRACRANIAL BILAT STUDY: CPT | Performed by: INTERNAL MEDICINE

## 2022-08-08 ENCOUNTER — TELEPHONE (OUTPATIENT)
Dept: CARDIOLOGY | Facility: CLINIC | Age: 77
End: 2022-08-08

## 2022-08-10 ENCOUNTER — TELEPHONE (OUTPATIENT)
Dept: CARDIOLOGY | Facility: CLINIC | Age: 77
End: 2022-08-10

## 2022-08-10 DIAGNOSIS — I10 PRIMARY HYPERTENSION: ICD-10-CM

## 2022-08-17 ENCOUNTER — OUTSIDE FACILITY SERVICE (OUTPATIENT)
Dept: CARDIOLOGY | Facility: CLINIC | Age: 77
End: 2022-08-17

## 2022-08-17 PROCEDURE — 92920 PRQ TRLUML C ANGIOP 1ART&/BR: CPT | Performed by: INTERNAL MEDICINE

## 2022-08-17 PROCEDURE — 92978 ENDOLUMINL IVUS OCT C 1ST: CPT | Performed by: INTERNAL MEDICINE

## 2022-08-17 PROCEDURE — 93459 L HRT ART/GRFT ANGIO: CPT | Performed by: INTERNAL MEDICINE

## 2022-08-20 ENCOUNTER — TELEPHONE (OUTPATIENT)
Dept: CARDIOLOGY | Facility: CLINIC | Age: 77
End: 2022-08-20

## 2022-08-20 LAB
BH CV XLRA MEAS LEFT DIST CCA EDV: 13.8 CM/SEC
BH CV XLRA MEAS LEFT DIST CCA PSV: 87.4 CM/SEC
BH CV XLRA MEAS LEFT DIST ICA EDV: -24.5 CM/SEC
BH CV XLRA MEAS LEFT DIST ICA PSV: -112.5 CM/SEC
BH CV XLRA MEAS LEFT ICA/CCA RATIO: -1.29
BH CV XLRA MEAS LEFT MID ICA EDV: -26.4 CM/SEC
BH CV XLRA MEAS LEFT MID ICA PSV: -85.5 CM/SEC
BH CV XLRA MEAS LEFT PROX CCA EDV: 10.7 CM/SEC
BH CV XLRA MEAS LEFT PROX CCA PSV: 109.4 CM/SEC
BH CV XLRA MEAS LEFT PROX ECA EDV: -1.89 CM/SEC
BH CV XLRA MEAS LEFT PROX ECA PSV: -102.5 CM/SEC
BH CV XLRA MEAS LEFT PROX ICA EDV: -15.7 CM/SEC
BH CV XLRA MEAS LEFT PROX ICA PSV: -89.9 CM/SEC
BH CV XLRA MEAS LEFT VERTEBRAL A EDV: 11.4 CM/SEC
BH CV XLRA MEAS LEFT VERTEBRAL A PSV: 49.9 CM/SEC
BH CV XLRA MEAS RIGHT DIST CCA EDV: 11.3 CM/SEC
BH CV XLRA MEAS RIGHT DIST CCA PSV: 82 CM/SEC
BH CV XLRA MEAS RIGHT DIST ICA EDV: -23.7 CM/SEC
BH CV XLRA MEAS RIGHT DIST ICA PSV: -106.4 CM/SEC
BH CV XLRA MEAS RIGHT ICA/CCA RATIO: -1.3
BH CV XLRA MEAS RIGHT MID ICA EDV: -24.3 CM/SEC
BH CV XLRA MEAS RIGHT MID ICA PSV: -97.6 CM/SEC
BH CV XLRA MEAS RIGHT PROX CCA EDV: 8.8 CM/SEC
BH CV XLRA MEAS RIGHT PROX CCA PSV: 74.6 CM/SEC
BH CV XLRA MEAS RIGHT PROX ECA EDV: -6.3 CM/SEC
BH CV XLRA MEAS RIGHT PROX ECA PSV: -127.8 CM/SEC
BH CV XLRA MEAS RIGHT PROX ICA EDV: -19.9 CM/SEC
BH CV XLRA MEAS RIGHT PROX ICA PSV: -88.2 CM/SEC
BH CV XLRA MEAS RIGHT VERTEBRAL A EDV: -8.8 CM/SEC
BH CV XLRA MEAS RIGHT VERTEBRAL A PSV: -50.2 CM/SEC
MAXIMAL PREDICTED HEART RATE: 146 BPM
STRESS TARGET HR: 124 BPM

## 2022-08-21 NOTE — PROGRESS NOTES
Please advise patient.  He is on ASA, but no cholesterol meds appears intol to statins.  Is he willing to try zetia? If so send in zetia 10 mg PO daily and get cmp/lipids in 6 weeks.  If he has tried zetia, is he willing to try Repatha or Praluent?

## 2022-08-22 ENCOUNTER — TELEPHONE (OUTPATIENT)
Dept: CARDIOLOGY | Facility: CLINIC | Age: 77
End: 2022-08-22

## 2022-08-22 DIAGNOSIS — I10 PRIMARY HYPERTENSION: ICD-10-CM

## 2022-08-22 DIAGNOSIS — I25.810 CORONARY ARTERY DISEASE INVOLVING CORONARY BYPASS GRAFT OF NATIVE HEART WITHOUT ANGINA PECTORIS: ICD-10-CM

## 2022-08-22 DIAGNOSIS — E78.5 DYSLIPIDEMIA: Primary | ICD-10-CM

## 2022-08-22 NOTE — TELEPHONE ENCOUNTER
----- Message from LISSET Pandey sent at 8/21/2022  8:22 AM EDT -----  Please advise patient.  He is on ASA, but no cholesterol meds appears intol to statins.  Is he willing to try zetia? If so send in zetia 10 mg PO daily and get cmp/lipids in 6 weeks.  If he has tried zetia, is he willing to try Repatha or Praluent?        Left detailed mess regarding the above mess. If pt is willing to try Zetia we will send in medication or if pt would rather try the injections ( Repatha or Praluent ) we can send in which ever pt prefers . He will also need to repeat labs * Cholesterol and CMP ) pt has apt tomorrow . Labs are pending and so is Zetia 10 mg       Carotid U/S -   Mild bifurcation disease on the right, mild to moderate bifurcation disease on the left with less than 50% stenosis by echo and Doppler bilaterally.     3.  Less than or equal to 15% stenosis by Doppler in the right internal carotid artery with mild atheroma identified.  16 to 49% stenosis by Doppler in the distal left internal carotid artery or mild tortuosity is present.  No obstructive atheroma is identified in either internal carotid artery.     Summary: Nonobstructive carotid disease bilaterally as detailed above      DAXA Sanchez    Spoke with pt this am 08/23/2022 at 10:37 am pt states that he is willing to try the zetia we will send in 60 tablets and pt will repeat labs here after he finishes his medication if can tolerate the meds we will send in RF for 1 yr .

## 2022-08-23 ENCOUNTER — OFFICE VISIT (OUTPATIENT)
Dept: CARDIOLOGY | Facility: CLINIC | Age: 77
End: 2022-08-23

## 2022-08-23 VITALS
HEART RATE: 81 BPM | TEMPERATURE: 97.7 F | BODY MASS INDEX: 26.36 KG/M2 | SYSTOLIC BLOOD PRESSURE: 124 MMHG | OXYGEN SATURATION: 99 % | WEIGHT: 164 LBS | HEIGHT: 66 IN | DIASTOLIC BLOOD PRESSURE: 77 MMHG

## 2022-08-23 DIAGNOSIS — I25.810 CORONARY ARTERY DISEASE INVOLVING CORONARY BYPASS GRAFT OF NATIVE HEART WITHOUT ANGINA PECTORIS: ICD-10-CM

## 2022-08-23 DIAGNOSIS — R06.02 SHORTNESS OF BREATH: ICD-10-CM

## 2022-08-23 DIAGNOSIS — G47.33 OSA (OBSTRUCTIVE SLEEP APNEA): ICD-10-CM

## 2022-08-23 DIAGNOSIS — R07.89 OTHER CHEST PAIN: Primary | ICD-10-CM

## 2022-08-23 DIAGNOSIS — E78.5 DYSLIPIDEMIA: ICD-10-CM

## 2022-08-23 DIAGNOSIS — I10 PRIMARY HYPERTENSION: ICD-10-CM

## 2022-08-23 DIAGNOSIS — I49.1 PREMATURE ATRIAL COMPLEX: ICD-10-CM

## 2022-08-23 DIAGNOSIS — I65.23 BILATERAL CAROTID ARTERY STENOSIS: ICD-10-CM

## 2022-08-23 PROCEDURE — 99214 OFFICE O/P EST MOD 30 MIN: CPT | Performed by: NURSE PRACTITIONER

## 2022-08-23 RX ORDER — EZETIMIBE 10 MG/1
10 TABLET ORAL DAILY
Qty: 60 TABLET | Refills: 0 | Status: SHIPPED | OUTPATIENT
Start: 2022-08-23 | End: 2022-10-20

## 2022-08-23 RX ORDER — LISINOPRIL 5 MG/1
5 TABLET ORAL DAILY
Qty: 30 TABLET | Refills: 11 | Status: SHIPPED | OUTPATIENT
Start: 2022-08-23 | End: 2023-02-07 | Stop reason: ALTCHOICE

## 2022-08-23 RX ORDER — CLOPIDOGREL BISULFATE 75 MG/1
75 TABLET ORAL DAILY
COMMUNITY
End: 2022-10-05 | Stop reason: SDUPTHER

## 2022-08-23 RX ORDER — ISOSORBIDE MONONITRATE 30 MG/1
15 TABLET, EXTENDED RELEASE ORAL EVERY EVENING
Qty: 30 TABLET | Refills: 11 | Status: SHIPPED | OUTPATIENT
Start: 2022-08-23 | End: 2022-10-05

## 2022-08-23 NOTE — PROGRESS NOTES
Subjective   Keyon Olivas is a 76 y.o. male     Chief Complaint   Patient presents with   • [facility] follow up       HPI    PROBLEM LIST:     1. Coronary artery disease   1.1 CABG x 3, 05/11/2000;  LIMA to LAD, Left radial artery to RCA. Left radial artery as T graft to LIMA/OM2  1.2 cath august 2006, stenting to RCA and OM2  1.3 Stress test 7/5/2022-moderate to large size dense, but only partially reversible defect involving the inferior and inferolateral wall compatible with ischemia, post-rest EF 59%  1.4 [facility] 8/17/2022-left main 50% tapering, circumflex which was less than 1 mm had high-grade stenosis, second large multiply branching obtuse marginal with segmental 80 to 90% stenosis proximally in 7090% stenosis beyond its major bifurcation, ongoing circumflex provide a second obtuse marginal which appeared to be the least moderate size which was totally occluded proximally, it was filled with ipsilateral collaterals, LAD totally occluded, right coronary artery totally occluded, artery graft to the right coronary artery was tiny but patent, attached on the near the acute margin and ongoing recording artery was severely diffusely diseased with only 1 mm or less in diameter, the internal mammary artery bypass to the middle third LAD was widely patent, there is retrograde fill to the area of a significant septal  and antegrade fill demonstrated sequential high-grade stenosis with a vessel of less than 2 mm in diameter, EF 55%, after PTCA there is 50% or less stenosis in both treated segments with no evidence of thrombus, dissection, branch loss or distal embolization.  I would recommend the patient then be evaluated for repeat percutaneous intervention in the first obtuse marginal and in the chronically totally occluded second obtuse marginal as these are the only size for potential mechanical revascularization given the patient's low level symptoms and decreased LV systolic performance.  2. Hypertension   3.  Dyslipidemia   3.1 intolerant to Crestor 2012, Lipitor, simvastatin, Lescol, niacin and Zetia  4. H/o hep. C; followed by Dr. Cash   5. Dyspnea on Exertion   5.1 Echo 2/27/2020-EF 50%, mild LVH, diastolic dysfunction 1, moderate to severe left atrial large amount, mild to moderate MR, mild TR, PA in the 30s  5.2 Echo 7/5/2022-EF 55 to 60%, diastolic dysfunction 2, trivial to mild MR, mild TR, PA in the low 30s  6.  DEACON, CPAP  7. Event Monitor 6/22 - 7/5/2022 - NSR, PAC, Sinus Arrhythmia   8.  Carotid artery disease  8.1 carotid artery ultrasound 8/4/2022-less than 50% stenosis bilaterally at bifurcations, less than or go to 15% stenosis or internal carotid artery, 64 9% stenosis distal left internal carotid artery, mild tortuosity, antegrade flow both vertebral arteries  9.  Event monitor 6/22 through 7/5/2022-normal sinus rhythm, sinus arrhythmia, sinus tach, sinus bradycardia, PACs    Patient is a 76-year-old male who presents today for follow-up status post left heart cath with Dr. Samaniego.  Patient says he has been having some left anterior chest discomfort that he has had maybe 2-3 occurrences since having his heart cath.  He says it occurs mostly in the evening.  He says it only last for few seconds so he is not use nitroglycerin.  He does also still have the left arm pain that goes to the elbow and that is almost daily.  He says sometimes that is with activity like if he is using his arm or sometimes with rest.  Again this only lasts for about 5 seconds.  He denies any palpitations, fluttering, syncope, orthopnea, PND or edema.  Patient says he does have episodes where he will get up and feel like he is going to pass out.  He says typically he will be watching TV.  He says he has maybe had 2-3 episodes.  Patient says he did notice walking and today that he was short of breath.  Per patient right groin site is unremarkable.    We went over left heart cath, carotid artery ultrasound and event monitor.  Patient  does not know if his symptoms are because now he knows was going on in his body or if he is really actually having additional issues.  I did advise I have sent a message to Dr. Goff to get update in regards to if he received the films and if he is reviewed them yet.  I advised patient once I know we will let him know.    Current Outpatient Medications on File Prior to Visit   Medication Sig Dispense Refill   • ALPRAZolam (XANAX) 1 MG tablet Take 1 mg by mouth Every Night.     • amLODIPine (NORVASC) 5 MG tablet Take 1 tablet by mouth Daily. 30 tablet 3   • aspirin 325 MG tablet Take 325 mg by mouth Daily.     • clopidogrel (PLAVIX) 75 MG tablet Take 75 mg by mouth Daily.     • ezetimibe (ZETIA) 10 MG tablet Take 1 tablet by mouth Daily. 60 tablet 0   • metoprolol tartrate (LOPRESSOR) 25 MG tablet Take 0.5 tablets by mouth 2 (Two) Times a Day. 60 tablet 11   • nitroglycerin (NITROSTAT) 0.4 MG SL tablet Place 1 tablet under the tongue Every 5 (Five) Minutes As Needed for Chest Pain. Take no more than 3 doses in 15 minutes. 35 tablet 5   • zolpidem (AMBIEN) 10 MG tablet Take 10 mg by mouth At Night As Needed for Sleep.     • [DISCONTINUED] lisinopril (PRINIVIL,ZESTRIL) 10 MG tablet Take 1 tablet by mouth Daily. 30 tablet 3     No current facility-administered medications on file prior to visit.       ALLERGIES    Crestor [rosuvastatin], Lescol [fluvastatin sodium], Lipitor [atorvastatin], Niacin and related, and Penicillins    Past Medical History:   Diagnosis Date   • Anxiety and depression 03/09/2017   • Bilateral carotid artery stenosis 8/23/2022   • BPH (benign prostatic hyperplasia) 03/09/2017   • CAD (coronary artery disease) 03/09/2017   • COVID-19     02/2022   • COVID-19 vaccine administered     02/26/2021, 03/26/2021, 03/10/2022 - Moderna   • DJD (degenerative joint disease) 03/09/2017   • Dyslipidemia 03/09/2017   • Hepatitis C     History of hepatitis C; followed by Dr. Cash in [place].   •  Hyperlipidemia    • Hypertension 2017   • Insomnia 2017   • Neuropathy    • DEACON (obstructive sleep apnea) 2017   • Poor short term memory 2017   • Small fiber neuropathy    • Ulcerative colitis (HCC) 2017       Social History     Socioeconomic History   • Marital status:    Tobacco Use   • Smoking status: Former Smoker     Packs/day: 1.00     Years: 15.00     Pack years: 15.00     Types: Cigarettes     Start date: 1965     Quit date: 1983     Years since quittin.6   • Smokeless tobacco: Former User     Types: Chew   • Tobacco comment: smoked 10-20 years, < last 1 PPD   Vaping Use   • Vaping Use: Never used   Substance and Sexual Activity   • Alcohol use: No   • Drug use: No   • Sexual activity: Yes     Partners: Female     Birth control/protection: None       Family History   Problem Relation Age of Onset   • Emphysema Mother    • Heart failure Mother    • Heart attack Father    • Memory loss Brother        Review of Systems   Constitutional: Negative for activity change, appetite change, chills, diaphoresis, fatigue and fever.   HENT: Negative for congestion, rhinorrhea, sinus pressure and sore throat.    Eyes: Positive for visual disturbance.   Respiratory: Positive for shortness of breath (just walking in from the truck ). Negative for apnea, cough, chest tightness and wheezing.    Cardiovascular: Positive for chest pain (x2 weeks a pain in left side of chest, last x4 mins and goes away; 2-3 x; occured mostly at night; daily pain left arm to elbow lasts about 5 sec, somtimes with activity and others with rest ). Negative for palpitations and leg swelling.   Gastrointestinal: Negative for blood in stool, constipation, diarrhea, nausea and vomiting.   Endocrine: Negative for cold intolerance and heat intolerance.   Genitourinary: Negative for difficulty urinating, dysuria, frequency and urgency.   Musculoskeletal: Negative for arthralgias, back pain, gait problem,  "joint swelling, neck pain and neck stiffness.   Skin: Negative for color change, rash and wound.   Allergic/Immunologic: Negative for environmental allergies and food allergies.   Neurological: Positive for dizziness (Has gotten up from watching tv x1 month x2-3 times and feels like he almost passes out). Negative for syncope ( ), weakness, light-headedness, numbness and headaches.   Hematological: Bruises/bleeds easily.   Psychiatric/Behavioral: Negative for sleep disturbance.       Objective   /77 (BP Location: Left arm, Patient Position: Sitting, Cuff Size: Adult)   Pulse 81   Temp 97.7 °F (36.5 °C)   Ht 167.6 cm (66\")   Wt 74.4 kg (164 lb)   SpO2 99%   BMI 26.47 kg/m²   Vitals:    08/23/22 1101   BP: 124/77   BP Location: Left arm   Patient Position: Sitting   Cuff Size: Adult   Pulse: 81   Temp: 97.7 °F (36.5 °C)   SpO2: 99%   Weight: 74.4 kg (164 lb)   Height: 167.6 cm (66\")      Lab Results (most recent)     None        Physical Exam  Vitals reviewed.   Constitutional:       General: He is awake.      Appearance: Normal appearance. He is well-developed, well-groomed and overweight.   HENT:      Head: Normocephalic.   Eyes:      General: Lids are normal.      Comments: Wears glasses    Neck:      Vascular: No carotid bruit, hepatojugular reflux or JVD.   Cardiovascular:      Rate and Rhythm: Normal rate and regular rhythm.      Pulses:           Radial pulses are 2+ on the right side and 2+ on the left side.        Dorsalis pedis pulses are 2+ on the right side and 2+ on the left side.        Posterior tibial pulses are 2+ on the right side and 2+ on the left side.      Heart sounds: Normal heart sounds.   Pulmonary:      Effort: Pulmonary effort is normal.      Breath sounds: Normal breath sounds and air entry.   Abdominal:      General: Bowel sounds are normal.      Palpations: Abdomen is soft.   Musculoskeletal:      Right lower leg: No edema.      Left lower leg: No edema.   Skin:     General: " Skin is warm and dry.      Findings: Bruising (Left wrist ) present.   Neurological:      Mental Status: He is alert and oriented to person, place, and time.   Psychiatric:         Attention and Perception: Attention and perception normal.         Mood and Affect: Mood and affect normal.         Speech: Speech normal.         Behavior: Behavior normal. Behavior is cooperative.         Thought Content: Thought content normal.         Cognition and Memory: Cognition and memory normal.         Judgment: Judgment normal.         Procedure   Procedures         Assessment & Plan      Diagnosis Plan   1. Other chest pain  isosorbide mononitrate (IMDUR) 30 MG 24 hr tablet   2. Coronary artery disease involving coronary bypass graft of native heart without angina pectoris  isosorbide mononitrate (IMDUR) 30 MG 24 hr tablet   3. Primary hypertension  lisinopril (PRINIVIL,ZESTRIL) 5 MG tablet   4. Dyslipidemia     5. Shortness of breath     6. DEACON (obstructive sleep apnea)     7. Bilateral carotid artery stenosis     8. Premature atrial complex         Return keep Oct F/U.    Chest pain/CAD-patient will start isosorbide to half a tab a day.  He is on aspirin, Plavix and he will try Zetia.  He was encouraged to use nitro as needed for chest pain no resolution to go to the ER.  The cost of the PK 9 inhibitors was too expensive.  Hypertension-patient will decrease his lisinopril to 5 mg.  He will continue his amlodipine and metoprolol.  I did this so that he could tolerate a half tab of Imdur in the evenings.  Dyslipidemia-patient will try Zetia.  If he does tolerate it he will get lipid and CMP in 6 weeks.  Shortness of breath-stable.  DEACON-patient uses CPAP.  Carotid artery disease-patient is on aspirin and statin.  PACs-patient is on beta-blocker.  He will continue his medication regimen with above-noted changes.  He will follow-up as scheduled in October or sooner if any changes or should he proceed with heart cath with   [facility] and we need to get him back in sooner.     Patient brought in medicine list to appointment, it's been reviewed with patient and med list was updated in the chart.     Advance Care Planning   ACP discussion was declined by the patient. Patient has an advance directive in EMR which is still valid.  Would like to have pamphlet also.         Electronically signed by:

## 2022-08-25 ENCOUNTER — HOSPITAL ENCOUNTER (OUTPATIENT)
Dept: CARDIOLOGY | Facility: HOSPITAL | Age: 77
Discharge: HOME | End: 2022-08-25

## 2022-08-25 DIAGNOSIS — Z00.6 EXAMINATION FOR NORMAL COMPARISON OR CONTROL IN CLINICAL RESEARCH: ICD-10-CM

## 2022-08-30 ENCOUNTER — TELEPHONE (OUTPATIENT)
Dept: CARDIOLOGY | Facility: CLINIC | Age: 77
End: 2022-08-30

## 2022-08-30 NOTE — TELEPHONE ENCOUNTER
Called patient and let him know Dr. Fay did review patient's [facility] film.  He had a question for Dr. Samaniego so Dr. Samaniego is re-reviewing his Cath film today and getting back with Dr. Fay.  I advised as soon as I Have an update I will let him know.  He appreciated same.

## 2022-08-30 NOTE — TELEPHONE ENCOUNTER
Pt called our office reporting extreme weakness.  He called EMS this morning and was told [facility] cannot do anything for his.  The PM LVM and sent an important chat message to Dr. Fay's nurse.  Message sent to Christianne Shah, requesting a call to obtain an update.

## 2022-08-31 ENCOUNTER — APPOINTMENT (OUTPATIENT)
Dept: GENERAL RADIOLOGY | Facility: HOSPITAL | Age: 77
End: 2022-08-31

## 2022-08-31 ENCOUNTER — HOSPITAL ENCOUNTER (OUTPATIENT)
Facility: HOSPITAL | Age: 77
Setting detail: OBSERVATION
LOS: 1 days | Discharge: HOME | End: 2022-09-02
Attending: EMERGENCY MEDICINE | Admitting: INTERNAL MEDICINE

## 2022-08-31 ENCOUNTER — TELEPHONE (OUTPATIENT)
Dept: CARDIOLOGY | Facility: CLINIC | Age: 77
End: 2022-08-31

## 2022-08-31 ENCOUNTER — APPOINTMENT (OUTPATIENT)
Dept: CARDIOLOGY | Facility: HOSPITAL | Age: 77
End: 2022-08-31

## 2022-08-31 DIAGNOSIS — R53.1 GENERALIZED WEAKNESS: Primary | ICD-10-CM

## 2022-08-31 DIAGNOSIS — N17.9 ACUTE KIDNEY INJURY: ICD-10-CM

## 2022-08-31 DIAGNOSIS — Z86.79 HISTORY OF CORONARY ARTERY DISEASE: ICD-10-CM

## 2022-08-31 DIAGNOSIS — R06.09 DYSPNEA ON EXERTION: ICD-10-CM

## 2022-08-31 DIAGNOSIS — E83.52 HYPERCALCEMIA: ICD-10-CM

## 2022-08-31 PROBLEM — I25.10 CAD (CORONARY ARTERY DISEASE): Status: ACTIVE | Noted: 2022-08-31

## 2022-08-31 PROBLEM — R07.89 OTHER CHEST PAIN: Status: RESOLVED | Noted: 2020-01-28 | Resolved: 2022-08-31

## 2022-08-31 PROBLEM — R53.81 MALAISE AND FATIGUE: Status: RESOLVED | Noted: 2020-01-28 | Resolved: 2022-08-31

## 2022-08-31 PROBLEM — R42 DIZZINESS: Status: RESOLVED | Noted: 2022-07-13 | Resolved: 2022-08-31

## 2022-08-31 PROBLEM — I25.110 CORONARY ARTERY DISEASE INVOLVING NATIVE CORONARY ARTERY OF NATIVE HEART WITH UNSTABLE ANGINA PECTORIS: Status: ACTIVE | Noted: 2017-03-09

## 2022-08-31 PROBLEM — I48.91 ATRIAL FIBRILLATION: Status: ACTIVE | Noted: 2022-08-31

## 2022-08-31 PROBLEM — I25.119 CORONARY ARTERY DISEASE INVOLVING NATIVE CORONARY ARTERY OF NATIVE HEART WITH ANGINA PECTORIS: Status: ACTIVE | Noted: 2017-03-09

## 2022-08-31 PROBLEM — I49.1 PREMATURE ATRIAL COMPLEX: Status: RESOLVED | Noted: 2022-08-23 | Resolved: 2022-08-31

## 2022-08-31 PROBLEM — R06.02 SHORTNESS OF BREATH: Status: RESOLVED | Noted: 2020-01-28 | Resolved: 2022-08-31

## 2022-08-31 PROBLEM — R53.83 MALAISE AND FATIGUE: Status: RESOLVED | Noted: 2020-01-28 | Resolved: 2022-08-31

## 2022-08-31 PROBLEM — R41.3 POOR SHORT TERM MEMORY: Status: RESOLVED | Noted: 2017-03-09 | Resolved: 2022-08-31

## 2022-08-31 PROBLEM — R94.39 ABNORMAL STRESS TEST: Status: RESOLVED | Noted: 2022-07-13 | Resolved: 2022-08-31

## 2022-08-31 LAB
ADV 40+41 DNA STL QL NAA+NON-PROBE: NOT DETECTED
ALBUMIN SERPL-MCNC: 4.8 G/DL (ref 3.5–5.2)
ALBUMIN/GLOB SERPL: 1.3 G/DL
ALP SERPL-CCNC: 97 U/L (ref 39–117)
ALT SERPL W P-5'-P-CCNC: 12 U/L (ref 1–41)
ANION GAP SERPL CALCULATED.3IONS-SCNC: 16 MMOL/L (ref 5–15)
AST SERPL-CCNC: 19 U/L (ref 1–40)
ASTRO TYP 1-8 RNA STL QL NAA+NON-PROBE: NOT DETECTED
BACTERIA UR QL AUTO: ABNORMAL /HPF
BASOPHILS # BLD AUTO: 0.03 10*3/MM3 (ref 0–0.2)
BASOPHILS NFR BLD AUTO: 0.3 % (ref 0–1.5)
BH CV ECHO MEAS - AO MAX PG: 4.4 MMHG
BH CV ECHO MEAS - AO MEAN PG: 2 MMHG
BH CV ECHO MEAS - AO ROOT DIAM: 3.8 CM
BH CV ECHO MEAS - AO V2 MAX: 104.5 CM/SEC
BH CV ECHO MEAS - AO V2 VTI: 20.1 CM
BH CV ECHO MEAS - AVA(I,D): 2.6 CM2
BH CV ECHO MEAS - EDV(CUBED): 85.2 ML
BH CV ECHO MEAS - EDV(MOD-SP2): 44.9 ML
BH CV ECHO MEAS - EDV(MOD-SP4): 49.4 ML
BH CV ECHO MEAS - EF(MOD-BP): 45.2 %
BH CV ECHO MEAS - EF(MOD-SP2): 43.4 %
BH CV ECHO MEAS - EF(MOD-SP4): 51.8 %
BH CV ECHO MEAS - ESV(CUBED): 32.8 ML
BH CV ECHO MEAS - ESV(MOD-SP2): 25.4 ML
BH CV ECHO MEAS - ESV(MOD-SP4): 23.8 ML
BH CV ECHO MEAS - FS: 27.3 %
BH CV ECHO MEAS - IVS/LVPW: 1 CM
BH CV ECHO MEAS - IVSD: 1.2 CM
BH CV ECHO MEAS - LA DIMENSION: 2.8 CM
BH CV ECHO MEAS - LAT PEAK E' VEL: 10.3 CM/SEC
BH CV ECHO MEAS - LV MASS(C)D: 191.3 GRAMS
BH CV ECHO MEAS - LV MAX PG: 2.7 MMHG
BH CV ECHO MEAS - LV MEAN PG: 1 MMHG
BH CV ECHO MEAS - LV V1 MAX: 81.5 CM/SEC
BH CV ECHO MEAS - LV V1 VTI: 15.2 CM
BH CV ECHO MEAS - LVIDD: 4.4 CM
BH CV ECHO MEAS - LVIDS: 3.2 CM
BH CV ECHO MEAS - LVOT AREA: 3.5 CM2
BH CV ECHO MEAS - LVOT DIAM: 2.1 CM
BH CV ECHO MEAS - LVPWD: 1.2 CM
BH CV ECHO MEAS - MED PEAK E' VEL: 7.3 CM/SEC
BH CV ECHO MEAS - MR MAX PG: 79.6 MMHG
BH CV ECHO MEAS - MR MAX VEL: 446 CM/SEC
BH CV ECHO MEAS - MR MEAN PG: 50 MMHG
BH CV ECHO MEAS - MR MEAN VEL: 336 CM/SEC
BH CV ECHO MEAS - MR VTI: 119 CM
BH CV ECHO MEAS - MV A MAX VEL: 67.7 CM/SEC
BH CV ECHO MEAS - MV DEC SLOPE: 139 CM/SEC2
BH CV ECHO MEAS - MV DEC TIME: 0.27 MSEC
BH CV ECHO MEAS - MV E MAX VEL: 36.8 CM/SEC
BH CV ECHO MEAS - MV E/A: 0.54
BH CV ECHO MEAS - MV P1/2T: 86.2 MSEC
BH CV ECHO MEAS - MVA(P1/2T): 2.6 CM2
BH CV ECHO MEAS - PA ACC TIME: 0.09 SEC
BH CV ECHO MEAS - PA PR(ACCEL): 36.7 MMHG
BH CV ECHO MEAS - PA V2 MAX: 89.7 CM/SEC
BH CV ECHO MEAS - PI END-D VEL: 90.9 CM/SEC
BH CV ECHO MEAS - RAP SYSTOLE: 3 MMHG
BH CV ECHO MEAS - RVSP: 18 MMHG
BH CV ECHO MEAS - SV(LVOT): 52.6 ML
BH CV ECHO MEAS - SV(MOD-SP2): 19.5 ML
BH CV ECHO MEAS - SV(MOD-SP4): 25.6 ML
BH CV ECHO MEAS - TAPSE (>1.6): 1.3 CM
BH CV ECHO MEAS - TR MAX PG: 15 MMHG
BH CV ECHO MEAS - TR MAX VEL: 185.8 CM/SEC
BH CV ECHO MEASUREMENTS AVERAGE E/E' RATIO: 4.18
BH CV XLRA - RV BASE: 3.1 CM
BH CV XLRA - RV LENGTH: 6 CM
BH CV XLRA - RV MID: 2.3 CM
BH CV XLRA - TDI S': 6.2 CM/SEC
BILIRUB SERPL-MCNC: 0.8 MG/DL (ref 0–1.2)
BILIRUB UR QL STRIP: NEGATIVE
BUN SERPL-MCNC: 28 MG/DL (ref 8–23)
BUN/CREAT SERPL: 13 (ref 7–25)
C CAYETANENSIS DNA STL QL NAA+NON-PROBE: NOT DETECTED
C COLI+JEJ+UPSA DNA STL QL NAA+NON-PROBE: NOT DETECTED
CA-I SERPL ISE-MCNC: 1.46 MMOL/L (ref 1.12–1.32)
CALCIUM SPEC-SCNC: 10.6 MG/DL (ref 8.6–10.5)
CHLORIDE SERPL-SCNC: 102 MMOL/L (ref 98–107)
CHOLEST SERPL-MCNC: 163 MG/DL (ref 0–200)
CLARITY UR: CLEAR
CO2 SERPL-SCNC: 17 MMOL/L (ref 22–29)
COLOR UR: YELLOW
CREAT SERPL-MCNC: 2.15 MG/DL (ref 0.76–1.27)
CRYPTOSP DNA STL QL NAA+NON-PROBE: NOT DETECTED
D-LACTATE SERPL-SCNC: 1.3 MMOL/L (ref 0.5–2)
D-LACTATE SERPL-SCNC: 2.6 MMOL/L (ref 0.5–2)
DEPRECATED RDW RBC AUTO: 41.1 FL (ref 37–54)
E HISTOLYT DNA STL QL NAA+NON-PROBE: NOT DETECTED
EAEC PAA PLAS AGGR+AATA ST NAA+NON-PRB: NOT DETECTED
EC STX1+STX2 GENES STL QL NAA+NON-PROBE: NOT DETECTED
EGFRCR SERPLBLD CKD-EPI 2021: 31.5 ML/MIN/1.73
EOSINOPHIL # BLD AUTO: 0.06 10*3/MM3 (ref 0–0.4)
EOSINOPHIL NFR BLD AUTO: 0.6 % (ref 0.3–6.2)
EPEC EAE GENE STL QL NAA+NON-PROBE: NOT DETECTED
ERYTHROCYTE [DISTWIDTH] IN BLOOD BY AUTOMATED COUNT: 13.3 % (ref 12.3–15.4)
ETEC LTA+ST1A+ST1B TOX ST NAA+NON-PROBE: NOT DETECTED
FLUAV SUBTYP SPEC NAA+PROBE: NOT DETECTED
FLUBV RNA ISLT QL NAA+PROBE: NOT DETECTED
G LAMBLIA DNA STL QL NAA+NON-PROBE: NOT DETECTED
GLOBULIN UR ELPH-MCNC: 3.7 GM/DL
GLUCOSE SERPL-MCNC: 125 MG/DL (ref 65–99)
GLUCOSE UR STRIP-MCNC: NEGATIVE MG/DL
HBA1C MFR BLD: 4.6 % (ref 4.8–5.6)
HCT VFR BLD AUTO: 42.5 % (ref 37.5–51)
HDLC SERPL-MCNC: 37 MG/DL (ref 40–60)
HGB BLD-MCNC: 14.2 G/DL (ref 13–17.7)
HGB UR QL STRIP.AUTO: ABNORMAL
HOLD SPECIMEN: NORMAL
HYALINE CASTS UR QL AUTO: ABNORMAL /LPF
IMM GRANULOCYTES # BLD AUTO: 0.07 10*3/MM3 (ref 0–0.05)
IMM GRANULOCYTES NFR BLD AUTO: 0.7 % (ref 0–0.5)
KETONES UR QL STRIP: ABNORMAL
LDLC SERPL CALC-MCNC: 106 MG/DL (ref 0–100)
LDLC/HDLC SERPL: 2.82 {RATIO}
LEFT ATRIUM VOLUME INDEX: 27.4 ML/M2
LEUKOCYTE ESTERASE UR QL STRIP.AUTO: NEGATIVE
LV EF 2D ECHO EST: 50 %
LYMPHOCYTES # BLD AUTO: 1.2 10*3/MM3 (ref 0.7–3.1)
LYMPHOCYTES NFR BLD AUTO: 11.7 % (ref 19.6–45.3)
MAGNESIUM SERPL-MCNC: 1.9 MG/DL (ref 1.6–2.4)
MAXIMAL PREDICTED HEART RATE: 146 BPM
MCH RBC QN AUTO: 28.6 PG (ref 26.6–33)
MCHC RBC AUTO-ENTMCNC: 33.4 G/DL (ref 31.5–35.7)
MCV RBC AUTO: 85.5 FL (ref 79–97)
MONOCYTES # BLD AUTO: 0.59 10*3/MM3 (ref 0.1–0.9)
MONOCYTES NFR BLD AUTO: 5.8 % (ref 5–12)
NEUTROPHILS NFR BLD AUTO: 8.29 10*3/MM3 (ref 1.7–7)
NEUTROPHILS NFR BLD AUTO: 80.9 % (ref 42.7–76)
NITRITE UR QL STRIP: NEGATIVE
NOROVIRUS GI+II RNA STL QL NAA+NON-PROBE: NOT DETECTED
NRBC BLD AUTO-RTO: 0 /100 WBC (ref 0–0.2)
NT-PROBNP SERPL-MCNC: 497.9 PG/ML (ref 0–900)
P SHIGELLOIDES DNA STL QL NAA+NON-PROBE: NOT DETECTED
PH UR STRIP.AUTO: 5.5 [PH] (ref 5–8)
PLATELET # BLD AUTO: 229 10*3/MM3 (ref 140–450)
PMV BLD AUTO: 9.4 FL (ref 6–12)
POTASSIUM SERPL-SCNC: 4.5 MMOL/L (ref 3.5–5.2)
PROT SERPL-MCNC: 8.5 G/DL (ref 6–8.5)
PROT UR QL STRIP: ABNORMAL
QT INTERVAL: 388 MS
QTC INTERVAL: 442 MS
RBC # BLD AUTO: 4.97 10*6/MM3 (ref 4.14–5.8)
RBC # UR STRIP: ABNORMAL /HPF
REF LAB TEST METHOD: ABNORMAL
RVA RNA STL QL NAA+NON-PROBE: NOT DETECTED
S ENT+BONG DNA STL QL NAA+NON-PROBE: NOT DETECTED
SAPO I+II+IV+V RNA STL QL NAA+NON-PROBE: NOT DETECTED
SARS-COV-2 RNA PNL SPEC NAA+PROBE: NOT DETECTED
SHIGELLA SP+EIEC IPAH ST NAA+NON-PROBE: NOT DETECTED
SODIUM SERPL-SCNC: 135 MMOL/L (ref 136–145)
SP GR UR STRIP: 1.02 (ref 1–1.03)
SQUAMOUS #/AREA URNS HPF: ABNORMAL /HPF
STRESS TARGET HR: 124 BPM
T4 FREE SERPL-MCNC: 1.47 NG/DL (ref 0.93–1.7)
TRIGL SERPL-MCNC: 109 MG/DL (ref 0–150)
TROPONIN T SERPL-MCNC: <0.01 NG/ML (ref 0–0.03)
TROPONIN T SERPL-MCNC: <0.01 NG/ML (ref 0–0.03)
TSH SERPL DL<=0.05 MIU/L-ACNC: 2.08 UIU/ML (ref 0.27–4.2)
UROBILINOGEN UR QL STRIP: ABNORMAL
V CHOL+PARA+VUL DNA STL QL NAA+NON-PROBE: NOT DETECTED
V CHOLERAE DNA STL QL NAA+NON-PROBE: NOT DETECTED
VLDLC SERPL-MCNC: 20 MG/DL (ref 5–40)
WBC # UR STRIP: ABNORMAL /HPF
WBC NRBC COR # BLD: 10.24 10*3/MM3 (ref 3.4–10.8)
WHOLE BLOOD HOLD COAG: NORMAL
WHOLE BLOOD HOLD SPECIMEN: NORMAL
Y ENTEROCOL DNA STL QL NAA+NON-PROBE: NOT DETECTED

## 2022-08-31 PROCEDURE — C9803 HOPD COVID-19 SPEC COLLECT: HCPCS

## 2022-08-31 PROCEDURE — 71045 X-RAY EXAM CHEST 1 VIEW: CPT

## 2022-08-31 PROCEDURE — 99220 PR INITIAL OBSERVATION CARE/DAY 70 MINUTES: CPT | Performed by: INTERNAL MEDICINE

## 2022-08-31 PROCEDURE — 25010000002 HEPARIN (PORCINE) PER 1000 UNITS: Performed by: INTERNAL MEDICINE

## 2022-08-31 PROCEDURE — 99284 EMERGENCY DEPT VISIT MOD MDM: CPT

## 2022-08-31 PROCEDURE — 99204 OFFICE O/P NEW MOD 45 MIN: CPT | Performed by: INTERNAL MEDICINE

## 2022-08-31 PROCEDURE — 93005 ELECTROCARDIOGRAM TRACING: CPT | Performed by: INTERNAL MEDICINE

## 2022-08-31 PROCEDURE — 93306 TTE W/DOPPLER COMPLETE: CPT

## 2022-08-31 PROCEDURE — 87636 SARSCOV2 & INF A&B AMP PRB: CPT | Performed by: EMERGENCY MEDICINE

## 2022-08-31 PROCEDURE — 96361 HYDRATE IV INFUSION ADD-ON: CPT

## 2022-08-31 PROCEDURE — 93010 ELECTROCARDIOGRAM REPORT: CPT | Performed by: INTERNAL MEDICINE

## 2022-08-31 PROCEDURE — 85025 COMPLETE CBC W/AUTO DIFF WBC: CPT | Performed by: EMERGENCY MEDICINE

## 2022-08-31 PROCEDURE — 96372 THER/PROPH/DIAG INJ SC/IM: CPT

## 2022-08-31 PROCEDURE — 82330 ASSAY OF CALCIUM: CPT | Performed by: EMERGENCY MEDICINE

## 2022-08-31 PROCEDURE — 93306 TTE W/DOPPLER COMPLETE: CPT | Performed by: INTERNAL MEDICINE

## 2022-08-31 PROCEDURE — 84439 ASSAY OF FREE THYROXINE: CPT | Performed by: EMERGENCY MEDICINE

## 2022-08-31 PROCEDURE — 83036 HEMOGLOBIN GLYCOSYLATED A1C: CPT | Performed by: INTERNAL MEDICINE

## 2022-08-31 PROCEDURE — 96360 HYDRATION IV INFUSION INIT: CPT

## 2022-08-31 PROCEDURE — 93005 ELECTROCARDIOGRAM TRACING: CPT | Performed by: EMERGENCY MEDICINE

## 2022-08-31 PROCEDURE — 84484 ASSAY OF TROPONIN QUANT: CPT | Performed by: EMERGENCY MEDICINE

## 2022-08-31 PROCEDURE — 83735 ASSAY OF MAGNESIUM: CPT | Performed by: EMERGENCY MEDICINE

## 2022-08-31 PROCEDURE — 81001 URINALYSIS AUTO W/SCOPE: CPT | Performed by: INTERNAL MEDICINE

## 2022-08-31 PROCEDURE — 80053 COMPREHEN METABOLIC PANEL: CPT | Performed by: EMERGENCY MEDICINE

## 2022-08-31 PROCEDURE — 87507 IADNA-DNA/RNA PROBE TQ 12-25: CPT | Performed by: INTERNAL MEDICINE

## 2022-08-31 PROCEDURE — 84443 ASSAY THYROID STIM HORMONE: CPT | Performed by: EMERGENCY MEDICINE

## 2022-08-31 PROCEDURE — 83605 ASSAY OF LACTIC ACID: CPT | Performed by: INTERNAL MEDICINE

## 2022-08-31 PROCEDURE — 80061 LIPID PANEL: CPT | Performed by: INTERNAL MEDICINE

## 2022-08-31 PROCEDURE — 83880 ASSAY OF NATRIURETIC PEPTIDE: CPT | Performed by: INTERNAL MEDICINE

## 2022-08-31 PROCEDURE — 84484 ASSAY OF TROPONIN QUANT: CPT | Performed by: INTERNAL MEDICINE

## 2022-08-31 RX ORDER — ONDANSETRON 4 MG/1
4 TABLET, FILM COATED ORAL EVERY 6 HOURS PRN
Status: DISCONTINUED | OUTPATIENT
Start: 2022-08-31 | End: 2022-09-02 | Stop reason: HOSPADM

## 2022-08-31 RX ORDER — SODIUM CHLORIDE 0.9 % (FLUSH) 0.9 %
10 SYRINGE (ML) INJECTION AS NEEDED
Status: DISCONTINUED | OUTPATIENT
Start: 2022-08-31 | End: 2022-09-02 | Stop reason: HOSPADM

## 2022-08-31 RX ORDER — ALPRAZOLAM 1 MG/1
1 TABLET ORAL NIGHTLY PRN
Status: DISCONTINUED | OUTPATIENT
Start: 2022-08-31 | End: 2022-09-02 | Stop reason: HOSPADM

## 2022-08-31 RX ORDER — ACETAMINOPHEN 325 MG/1
650 TABLET ORAL EVERY 4 HOURS PRN
Status: DISCONTINUED | OUTPATIENT
Start: 2022-08-31 | End: 2022-09-02 | Stop reason: HOSPADM

## 2022-08-31 RX ORDER — ACETAMINOPHEN 160 MG/5ML
650 SOLUTION ORAL EVERY 4 HOURS PRN
Status: DISCONTINUED | OUTPATIENT
Start: 2022-08-31 | End: 2022-09-02 | Stop reason: HOSPADM

## 2022-08-31 RX ORDER — NITROGLYCERIN 0.4 MG/1
0.4 TABLET SUBLINGUAL
Status: DISCONTINUED | OUTPATIENT
Start: 2022-08-31 | End: 2022-09-02 | Stop reason: HOSPADM

## 2022-08-31 RX ORDER — ONDANSETRON 2 MG/ML
4 INJECTION INTRAMUSCULAR; INTRAVENOUS EVERY 6 HOURS PRN
Status: DISCONTINUED | OUTPATIENT
Start: 2022-08-31 | End: 2022-09-02 | Stop reason: HOSPADM

## 2022-08-31 RX ORDER — SODIUM CHLORIDE 9 MG/ML
100 INJECTION, SOLUTION INTRAVENOUS CONTINUOUS
Status: DISCONTINUED | OUTPATIENT
Start: 2022-08-31 | End: 2022-09-02

## 2022-08-31 RX ORDER — ISOSORBIDE MONONITRATE 30 MG/1
30 TABLET, EXTENDED RELEASE ORAL
Status: DISCONTINUED | OUTPATIENT
Start: 2022-09-01 | End: 2022-09-02 | Stop reason: HOSPADM

## 2022-08-31 RX ORDER — SODIUM CHLORIDE 0.9 % (FLUSH) 0.9 %
10 SYRINGE (ML) INJECTION EVERY 12 HOURS SCHEDULED
Status: DISCONTINUED | OUTPATIENT
Start: 2022-08-31 | End: 2022-09-02 | Stop reason: HOSPADM

## 2022-08-31 RX ORDER — ACETAMINOPHEN 650 MG/1
650 SUPPOSITORY RECTAL EVERY 4 HOURS PRN
Status: DISCONTINUED | OUTPATIENT
Start: 2022-08-31 | End: 2022-09-02 | Stop reason: HOSPADM

## 2022-08-31 RX ORDER — ASPIRIN 81 MG/1
81 TABLET ORAL DAILY
Status: DISCONTINUED | OUTPATIENT
Start: 2022-08-31 | End: 2022-09-02 | Stop reason: HOSPADM

## 2022-08-31 RX ORDER — AMLODIPINE BESYLATE 5 MG/1
5 TABLET ORAL DAILY
Status: DISCONTINUED | OUTPATIENT
Start: 2022-08-31 | End: 2022-09-02 | Stop reason: HOSPADM

## 2022-08-31 RX ORDER — HEPARIN SODIUM 5000 [USP'U]/ML
5000 INJECTION, SOLUTION INTRAVENOUS; SUBCUTANEOUS EVERY 8 HOURS SCHEDULED
Status: DISCONTINUED | OUTPATIENT
Start: 2022-08-31 | End: 2022-09-02 | Stop reason: HOSPADM

## 2022-08-31 RX ADMIN — SODIUM CHLORIDE 100 ML/HR: 9 INJECTION, SOLUTION INTRAVENOUS at 19:10

## 2022-08-31 RX ADMIN — Medication 10 ML: at 20:06

## 2022-08-31 RX ADMIN — HEPARIN SODIUM 5000 UNITS: 5000 INJECTION INTRAVENOUS; SUBCUTANEOUS at 20:05

## 2022-08-31 RX ADMIN — ALPRAZOLAM 1 MG: 1 TABLET ORAL at 20:05

## 2022-08-31 RX ADMIN — AMLODIPINE BESYLATE 5 MG: 5 TABLET ORAL at 20:05

## 2022-08-31 RX ADMIN — ASPIRIN 81 MG: 81 TABLET, COATED ORAL at 20:05

## 2022-08-31 RX ADMIN — METOPROLOL TARTRATE 12.5 MG: 25 TABLET, FILM COATED ORAL at 20:05

## 2022-08-31 NOTE — TELEPHONE ENCOUNTER
Pt's son called reporting the pt is declining rapidly and he came to [place] to pick the pt up and took him to [place] to his house.  He is not listed on his HIPPA however, the pt gave the PM permission to speak with his son.  The PM called Dr. Samaniego and he advised the pt to go to [facility] and he will call Dr. Fay regarding the review of his cath films yesterday. Pt's son notified and verbalized an understanding.

## 2022-09-01 LAB
ANION GAP SERPL CALCULATED.3IONS-SCNC: 11 MMOL/L (ref 5–15)
BASOPHILS # BLD AUTO: 0.05 10*3/MM3 (ref 0–0.2)
BASOPHILS NFR BLD AUTO: 0.6 % (ref 0–1.5)
BUN SERPL-MCNC: 32 MG/DL (ref 8–23)
BUN/CREAT SERPL: 24.4 (ref 7–25)
CALCIUM SPEC-SCNC: 9.7 MG/DL (ref 8.6–10.5)
CHLORIDE SERPL-SCNC: 107 MMOL/L (ref 98–107)
CO2 SERPL-SCNC: 18 MMOL/L (ref 22–29)
CREAT SERPL-MCNC: 1.31 MG/DL (ref 0.76–1.27)
DEPRECATED RDW RBC AUTO: 41.2 FL (ref 37–54)
EGFRCR SERPLBLD CKD-EPI 2021: 57.1 ML/MIN/1.73
EOSINOPHIL # BLD AUTO: 0.2 10*3/MM3 (ref 0–0.4)
EOSINOPHIL NFR BLD AUTO: 2.6 % (ref 0.3–6.2)
ERYTHROCYTE [DISTWIDTH] IN BLOOD BY AUTOMATED COUNT: 13.2 % (ref 12.3–15.4)
GLUCOSE SERPL-MCNC: 94 MG/DL (ref 65–99)
HCT VFR BLD AUTO: 38.2 % (ref 37.5–51)
HGB BLD-MCNC: 12.7 G/DL (ref 13–17.7)
IMM GRANULOCYTES # BLD AUTO: 0.03 10*3/MM3 (ref 0–0.05)
IMM GRANULOCYTES NFR BLD AUTO: 0.4 % (ref 0–0.5)
LYMPHOCYTES # BLD AUTO: 1.3 10*3/MM3 (ref 0.7–3.1)
LYMPHOCYTES NFR BLD AUTO: 16.6 % (ref 19.6–45.3)
MCH RBC QN AUTO: 28.7 PG (ref 26.6–33)
MCHC RBC AUTO-ENTMCNC: 33.2 G/DL (ref 31.5–35.7)
MCV RBC AUTO: 86.4 FL (ref 79–97)
MONOCYTES # BLD AUTO: 0.68 10*3/MM3 (ref 0.1–0.9)
MONOCYTES NFR BLD AUTO: 8.7 % (ref 5–12)
NEUTROPHILS NFR BLD AUTO: 5.58 10*3/MM3 (ref 1.7–7)
NEUTROPHILS NFR BLD AUTO: 71.1 % (ref 42.7–76)
NRBC BLD AUTO-RTO: 0 /100 WBC (ref 0–0.2)
PLATELET # BLD AUTO: 204 10*3/MM3 (ref 140–450)
PMV BLD AUTO: 9.4 FL (ref 6–12)
POTASSIUM SERPL-SCNC: 5.5 MMOL/L (ref 3.5–5.2)
QT INTERVAL: 318 MS
QT INTERVAL: 380 MS
QTC INTERVAL: 418 MS
QTC INTERVAL: 426 MS
RBC # BLD AUTO: 4.42 10*6/MM3 (ref 4.14–5.8)
SODIUM SERPL-SCNC: 136 MMOL/L (ref 136–145)
WBC NRBC COR # BLD: 7.84 10*3/MM3 (ref 3.4–10.8)

## 2022-09-01 PROCEDURE — 93010 ELECTROCARDIOGRAM REPORT: CPT | Performed by: INTERNAL MEDICINE

## 2022-09-01 PROCEDURE — 96372 THER/PROPH/DIAG INJ SC/IM: CPT

## 2022-09-01 PROCEDURE — 93005 ELECTROCARDIOGRAM TRACING: CPT | Performed by: INTERNAL MEDICINE

## 2022-09-01 PROCEDURE — G0378 HOSPITAL OBSERVATION PER HR: HCPCS

## 2022-09-01 PROCEDURE — 85025 COMPLETE CBC W/AUTO DIFF WBC: CPT | Performed by: INTERNAL MEDICINE

## 2022-09-01 PROCEDURE — 96361 HYDRATE IV INFUSION ADD-ON: CPT

## 2022-09-01 PROCEDURE — 97535 SELF CARE MNGMENT TRAINING: CPT

## 2022-09-01 PROCEDURE — 97166 OT EVAL MOD COMPLEX 45 MIN: CPT

## 2022-09-01 PROCEDURE — 99226 PR SBSQ OBSERVATION CARE/DAY 35 MINUTES: CPT | Performed by: INTERNAL MEDICINE

## 2022-09-01 PROCEDURE — 25010000002 HEPARIN (PORCINE) PER 1000 UNITS: Performed by: INTERNAL MEDICINE

## 2022-09-01 PROCEDURE — 80048 BASIC METABOLIC PNL TOTAL CA: CPT | Performed by: INTERNAL MEDICINE

## 2022-09-01 RX ORDER — LOPERAMIDE HYDROCHLORIDE 2 MG/1
2 CAPSULE ORAL 2 TIMES DAILY
Status: DISCONTINUED | OUTPATIENT
Start: 2022-09-01 | End: 2022-09-02 | Stop reason: HOSPADM

## 2022-09-01 RX ORDER — CLOPIDOGREL BISULFATE 75 MG/1
75 TABLET ORAL DAILY
Status: DISCONTINUED | OUTPATIENT
Start: 2022-09-01 | End: 2022-09-02 | Stop reason: HOSPADM

## 2022-09-01 RX ORDER — LOPERAMIDE HYDROCHLORIDE 2 MG/1
2 CAPSULE ORAL 2 TIMES DAILY
Status: DISCONTINUED | OUTPATIENT
Start: 2022-09-01 | End: 2022-09-01

## 2022-09-01 RX ADMIN — LOPERAMIDE HYDROCHLORIDE 2 MG: 2 CAPSULE ORAL at 17:20

## 2022-09-01 RX ADMIN — HEPARIN SODIUM 5000 UNITS: 5000 INJECTION INTRAVENOUS; SUBCUTANEOUS at 14:32

## 2022-09-01 RX ADMIN — METOPROLOL TARTRATE 25 MG: 25 TABLET, FILM COATED ORAL at 21:11

## 2022-09-01 RX ADMIN — Medication 10 ML: at 21:14

## 2022-09-01 RX ADMIN — ALPRAZOLAM 1 MG: 1 TABLET ORAL at 21:11

## 2022-09-01 RX ADMIN — SODIUM CHLORIDE 100 ML/HR: 9 INJECTION, SOLUTION INTRAVENOUS at 21:12

## 2022-09-01 RX ADMIN — ISOSORBIDE MONONITRATE 30 MG: 30 TABLET, EXTENDED RELEASE ORAL at 09:32

## 2022-09-01 RX ADMIN — SODIUM CHLORIDE 100 ML/HR: 9 INJECTION, SOLUTION INTRAVENOUS at 05:02

## 2022-09-01 RX ADMIN — HEPARIN SODIUM 5000 UNITS: 5000 INJECTION INTRAVENOUS; SUBCUTANEOUS at 21:12

## 2022-09-01 RX ADMIN — METOPROLOL TARTRATE 12.5 MG: 25 TABLET, FILM COATED ORAL at 09:32

## 2022-09-01 RX ADMIN — SODIUM CHLORIDE 100 ML/HR: 9 INJECTION, SOLUTION INTRAVENOUS at 17:20

## 2022-09-01 RX ADMIN — CLOPIDOGREL BISULFATE 75 MG: 75 TABLET ORAL at 14:32

## 2022-09-01 RX ADMIN — HEPARIN SODIUM 5000 UNITS: 5000 INJECTION INTRAVENOUS; SUBCUTANEOUS at 05:02

## 2022-09-01 NOTE — CASE MANAGEMENT/SOCIAL WORK
Discharge Planning Assessment  [facility]     Patient Name: Keyon Olivas  MRN: [id number]  Today's Date: 9/1/2022    Admit Date: 8/31/2022     Discharge Needs Assessment     Row Name 09/01/22 1134       Living Environment    People in Home alone    Current Living Arrangements home       Resource/Environmental Concerns    Resource/Environmental Concerns none       Transition Planning    Patient/Family Anticipates Transition to home with family    Patient/Family Anticipated Services at Transition none    Transportation Anticipated family or friend will provide       Discharge Needs Assessment    Equipment Currently Used at Home none;wound care supplies               Discharge Plan     Row Name 09/01/22 1135       Plan    Plan Home    Patient/Family in Agreement with Plan yes    Plan Comments Spoke w/pt in room. Insurance confirmed. Fills  scripts @ [employer] [place]. No issues obtaining medications. Not current w/HH or DME, has ileostomy didn't verbalize any issues obtaining supplies. Family will transport @ d/c. No d/c needs verbalized ATT. Awaiting PT/OT rec.s    Final Discharge Disposition Code 01 - home or self-care              Continued Care and Services - Admitted Since 8/31/2022    Coordination has not been started for this encounter.       Expected Discharge Date and Time     Expected Discharge Date Expected Discharge Time    Sep 7, 2022          Demographic Summary     Row Name 09/01/22 1133       General Information    Admission Type observation    Arrived From home    Referral Source emergency department    General Information Comments Has POA & LW paperwork on file. PCP is Dr Osman Olivas               Functional Status     Row Name 09/01/22 1134       Functional Status    Usual Activity Tolerance fair       Functional Status, IADL    Medications independent    Meal Preparation independent    Housekeeping independent    Laundry independent    Shopping independent               Psychosocial     No documentation.                Abuse/Neglect    No documentation.                Legal    No documentation.                Substance Abuse    No documentation.                Patient Forms    No documentation.                   Theodore Richardson RN

## 2022-09-01 NOTE — THERAPY EVALUATION
Patient Name: Keyon Olivas  : 1945    MRN: [id number]                              Today's Date: 2022       Admit Date: 2022    Visit Dx:     ICD-10-CM ICD-9-CM   1. Generalized weakness  R53.1 780.79   2. Dyspnea on exertion  R06.00 786.09   3. History of coronary artery disease  Z86.79 V12.59   4. Acute kidney injury (HCC)  N17.9 584.9   5. Hypercalcemia  E83.52 275.42     Patient Active Problem List   Diagnosis   • Coronary artery disease involving native coronary artery of native heart with angina pectoris (HCC)   • Essential hypertension   • Hyperlipidemia LDL goal <70   • DJD (degenerative joint disease)   • Anxiety and depression   • BPH (benign prostatic hyperplasia)   • Ulcerative colitis (HCC)   • DEACON (obstructive sleep apnea)   • Insomnia   • Cervical stenosis of spinal canal   • Neuropathy   • Bilateral carotid artery stenosis   • Weakness   • ANNA MARIE (acute kidney injury) (HCC)   • Atrial fibrillation (HCC)     Past Medical History:   Diagnosis Date   • ANNA MARIE (acute kidney injury) (HCC) 2022   • Anxiety and depression 2017   • Atrial fibrillation (HCC) 2022   • Bilateral carotid artery stenosis 2022   • BPH (benign prostatic hyperplasia) 2017   • CAD (coronary artery disease) 2017   • COVID-19     2022   • COVID-19 vaccine administered     2021, 2021, 03/10/2022 - Moderna   • DJD (degenerative joint disease) 2017   • Dyslipidemia 2017   • Hepatitis C     History of hepatitis C; followed by Dr. Cash in [place].   • Hyperlipidemia    • Hypertension 2017   • Insomnia 2017   • Neuropathy    • DEACON (obstructive sleep apnea) 2017   • Poor short term memory 2017   • Small fiber neuropathy    • Ulcerative colitis (HCC) 2017     Past Surgical History:   Procedure Laterality Date   • CARDIAC CATHETERIZATION  2000   • COLECTOMY TOTAL      Ulcerative colitis, status post total colectomy with ileostomy.    •  CORONARY ARTERY BYPASS GRAFT  05/11/2000    x3   • CORONARY STENT PLACEMENT  08/2006      General Information     Row Name 09/01/22 1311          OT Time and Intention    Document Type evaluation  -KF     Mode of Treatment occupational therapy  -     Row Name 09/01/22 1311          General Information    Patient Profile Reviewed yes  -KF     Prior Level of Function independent:;all household mobility;transfer;bed mobility;ADL's  -KF     Existing Precautions/Restrictions fall  illeostomy  -KF     Barriers to Rehab medically complex;previous functional deficit  -KF     Row Name 09/01/22 1311          Occupational Profile    Environmental Supports and Barriers (Occupational Profile) no DME at baseline  -KF     Row Name 09/01/22 1311          Living Environment    People in Home alone  -KF     Row Name 09/01/22 1311          Home Main Entrance    Number of Stairs, Main Entrance none  -KF     Row Name 09/01/22 1311          Stairs Within Home, Primary    Stairs, Within Home, Primary to access laundry  -KF     Number of Stairs, Within Home, Primary twelve  -KF     Stair Railings, Within Home, Primary none  -KF     Row Name 09/01/22 1311          Cognition    Orientation Status (Cognition) oriented x 4  -KF     Row Name 09/01/22 1311          Safety Issues, Functional Mobility    Safety Issues Affecting Function (Mobility) awareness of need for assistance;insight into deficits/self-awareness;safety precaution awareness  -KF     Impairments Affecting Function (Mobility) balance;endurance/activity tolerance;strength  -KF           User Key  (r) = Recorded By, (t) = Taken By, (c) = Cosigned By    Initials Name Provider Type    KF Angeles Boateng OT Occupational Therapist                 Mobility/ADL's     Row Name 09/01/22 1315          Bed Mobility    Bed Mobility scooting/bridging;supine-sit  -KF     Scooting/Bridging [place] (Bed Mobility) standby assist  -KF     Supine-Sit [place] (Bed Mobility) standby  assist  -KF     Bed Mobility, Safety Issues decreased use of arms for pushing/pulling;decreased use of legs for bridging/pushing  -     Comment, (Bed Mobility) no dizziness, good balance sitting EOB, did have leakage from ostomy requiring hygiene assist and assist for changing of ostomy by nursing during session while completing ADLs  -     Row Name 09/01/22 1315          Transfers    Transfers sit-stand transfer;stand-sit transfer;bed-chair transfer  -     Comment, (Transfers) unsteady with dynamic standing tasks  -KF     Bed-Chair [place] (Transfers) contact guard  -     Sit-Stand [place] (Transfers) contact guard  -     Stand-Sit [place] (Transfers) contact guard  -     Row Name 09/01/22 1315          Bed-Chair Transfer    Comment, (Bed-Chair Transfer) HHA intermittently  -     Row Name 09/01/22 1315          Functional Mobility    Functional Mobility- Ind. Level minimum assist (75% patient effort)  -     Functional Mobility-Distance (Feet) 4  -     Functional Mobility- Safety Issues step length decreased;weight-shifting ability decreased  -     Functional Mobility- Comment deferred 2/2 fatigue post bathing and transfers for dressing at this time tolerating steps to chair  -     Row Name 09/01/22 1315          Activities of Daily Living    BADL Assessment/Intervention upper body dressing;lower body dressing;toileting;bathing  -[facility] Name 09/01/22 1315          Upper Body Dressing Assessment/Training    [place] Level (Upper Body Dressing) don;doff;front opening garment;minimum assist (75% patient effort)  -     Position (Upper Body Dressing) edge of bed sitting  -     Comment, (Upper Body Dressing) able to weightshift EOB assist only due to hygiene requirements and leaking ostomy  -     Row Name 09/01/22 1315          Lower Body Dressing Assessment/Training    [place] Level (Lower Body Dressing) doff;don;socks;standby assist;pants/bottoms;contact guard  assist  -KF     Position (Lower Body Dressing) unsupported sitting  -KF     Comment, (Lower Body Dressing) CGA for doffing/donning clean undergarment and donning pants  -KF     Row Name 09/01/22 1315          Toileting Assessment/Training    [place] Level (Toileting) perform perineal hygiene;minimum assist (75% patient effort)  -KF     Position (Toileting) unsupported standing;unsupported sitting  -KF     Row Name 09/01/22 1315          Bathing Assessment/Intervention    [place] Level (Bathing) lower body;minimum assist (75% patient effort);set up;perineal area  -KF     Position (Bathing) unsupported sitting;supported standing  -KF     Comment, (Bathing) use of armrest for support in standing PRN  -KF           User Key  (r) = Recorded By, (t) = Taken By, (c) = Cosigned By    Initials Name Provider Type    KF Angeles Boateng, OT Occupational Therapist               Obj/Interventions     Row Name 09/01/22 1322          Sensory Assessment (Somatosensory)    Sensory Assessment (Somatosensory) UE sensation intact  -[facility] Name 09/01/22 1322          Vision Assessment/Intervention    Visual Impairment/Limitations WFL  -KF     [facility] Name 09/01/22 1322          Range of Motion Comprehensive    General Range of Motion bilateral upper extremity ROM WFL  -[facility] Name 09/01/22 1322          Strength Comprehensive (MMT)    General Manual Muscle Testing (MMT) Assessment upper extremity strength deficits identified  -KF     Comment, General Manual Muscle Testing (MMT) Assessment BUE 4/5 grossly  -[facility] Name 09/01/22 1322          Motor Skills    Motor Skills coordination  -KF     Coordination WFL;bilateral;upper extremity  -[facility] Name 09/01/22 1322          Balance    Balance Assessment sitting static balance;sitting dynamic balance;standing static balance;standing dynamic balance  -KF     Static Sitting Balance supervision  -KF     Dynamic Sitting Balance standby assist  -KF     Position, Sitting  Balance sitting edge of bed;unsupported  -KF     Static Standing Balance contact guard  -KF     Dynamic Standing Balance minimal assist  -KF     Position/Device Used, Standing Balance supported  -KF     Balance Interventions standing;supported;sit to stand;minimal challenge;weight shifting activity;UE activity with balance activity;occupation based/functional task  -KF     Comment, Balance [place] dynamic standing balance  -KF           User Key  (r) = Recorded By, (t) = Taken By, (c) = Cosigned By    Initials Name Provider Type    KF Angeles Boateng, OT Occupational Therapist               Goals/Plan     Row Name 09/01/22 1331          Transfer Goal 1 (OT)    Activity/Assistive Device (Transfer Goal 1, OT) toilet;walker, rolling  -KF     [place] Level/Cues Needed (Transfer Goal 1, OT) contact guard required  -KF     Time Frame (Transfer Goal 1, OT) long term goal (LTG);10 days  -KF     Progress/Outcome (Transfer Goal 1, OT) new goal  -KF     Row Name 09/01/22 1331          Dressing Goal 1 (OT)    Activity/Device (Dressing Goal 1, OT) lower body dressing  socks/undergarment and pants with good activity tolerance  -KF     [place]/Cues Needed (Dressing Goal 1, OT) supervision required  -KF     Time Frame (Dressing Goal 1, OT) long term goal (LTG);10 days  -KF     Progress/Outcome (Dressing Goal 1, OT) new goal  -     Row Name 09/01/22 1331          Problem Specific Goal 1 (OT)    Problem Specific Goal 1 (OT) Tolerate standing TA/TE for 10 min for ADL completion tolerance  -KF     Time Frame (Problem Specific Goal 1, OT) long term goal (LTG);10 days  -KF     Progress/Outcome (Problem Specific Goal 1, OT) new goal;goal ongoing  -KF     Row Name 09/01/22 1331          Therapy Assessment/Plan (OT)    Planned Therapy Interventions (OT) activity tolerance training;adaptive equipment training;BADL retraining;occupation/activity based interventions;strengthening exercise;transfer/mobility retraining;functional  balance retraining;ROM/therapeutic exercise;patient/caregiver education/training  -           User Key  (r) = Recorded By, (t) = Taken By, (c) = Cosigned By    Initials Name Provider Type    KF Angeles Boateng, OT Occupational Therapist               Clinical Impression     Row Name 09/01/22 1325          Pain Assessment    Pretreatment Pain Rating 0/10 - no pain  -KF     Posttreatment Pain Rating 0/10 - no pain  -KF     Pre/Posttreatment Pain Comment tolerated  -KF     Pain Intervention(s) Repositioned;Ambulation/increased activity  -     Row Name 09/01/22 1325          Plan of Care Review    Plan of Care Reviewed With patient  -     Progress improving  -     Outcome Evaluation OT eval completed Pt presents with deficits in strength, balance, and activity tolerance compared to baseline for ADL/functional transfer completion, [place] dynamic standing balance progressed to CGA with HHA intermittently, SBA LBD, setup UBD, [place] bathing tasks, recom IPOT d/c IRF pending progress  -     Row Name 09/01/22 1325          Therapy Assessment/Plan (OT)    Rehab Potential (OT) good, to achieve stated therapy goals  -     Criteria for Skilled Therapeutic Interventions Met (OT) yes;meets criteria;skilled treatment is necessary  -KF     Therapy Frequency (OT) daily  -     Row Name 09/01/22 1325          Therapy Plan Review/Discharge Plan (OT)    Anticipated Discharge Disposition (OT) inpatient rehabilitation facility  -     Row Name 09/01/22 1325          Vital Signs    Pre Systolic BP Rehab 118  RN cleared VSS  -KF     Pre Treatment Diastolic BP 72  -KF     Pre SpO2 (%) 100  -KF     O2 Delivery Pre Treatment room air  -KF     Post SpO2 (%) 100  -KF     O2 Delivery Post Treatment room air  -KF     Pre Patient Position Supine  -KF     Post Patient Position Sitting  -KF     Row Name 09/01/22 1325          Positioning and Restraints    Pre-Treatment Position in bed  -KF     Post Treatment Position chair  -KF     In  Chair notified nsg;sitting;reclined;call light within reach;encouraged to call for assist;exit alarm on;waffle cushion;legs elevated;with nsg  -KF           User Key  (r) = Recorded By, (t) = Taken By, (c) = Cosigned By    Initials Name Provider Type    Angeles Lea OT Occupational Therapist               Outcome Measures     Row Name 09/01/22 1336          How much help from another is currently needed...    Putting on and taking off regular lower body clothing? 3  -KF     Bathing (including washing, rinsing, and drying) 3  -KF     Toileting (which includes using toilet bed pan or urinal) 3  -KF     Putting on and taking off regular upper body clothing 4  -KF     Taking care of personal grooming (such as brushing teeth) 3  -KF     Eating meals 4  -KF     AM-PAC 6 Clicks Score (OT) 20  -KF     Row Name 09/01/22 1336          Functional Assessment    Outcome Measure Options AM-PAC 6 Clicks Daily Activity (OT)  -KF           User Key  (r) = Recorded By, (t) = Taken By, (c) = Cosigned By    Initials Name Provider Type    Angeles Lea OT Occupational Therapist                Occupational Therapy Education                 Title: PT OT SLP Therapies (In Progress)     Topic: Occupational Therapy (In Progress)     Point: ADL training (Done)     Description:   Instruct learner(s) on proper safety adaptation and remediation techniques during self care or transfers.   Instruct in proper use of assistive devices.              Learning Progress Summary           Patient Acceptance, E,TB,D, VU,DU,NR by  at 9/1/2022 1276                   Point: Home exercise program (Not Started)     Description:   Instruct learner(s) on appropriate technique for monitoring, assisting and/or progressing therapeutic exercises/activities.              Learner Progress:  Not documented in this visit.          Point: Precautions (Done)     Description:   Instruct learner(s) on prescribed precautions during self-care and  functional transfers.              Learning Progress Summary           Patient Acceptance, E,TB,D, VU,DU,NR by KF at 9/1/2022 1337                   Point: Body mechanics (Done)     Description:   Instruct learner(s) on proper positioning and spine alignment during self-care, functional mobility activities and/or exercises.              Learning Progress Summary           Patient Acceptance, E,TB,D, VU,DU,NR by KF at 9/1/2022 1337                               User Key     Initials Effective Dates Name Provider Type Discipline     06/16/21 -  Angeles Boateng OT Occupational Therapist OT              OT Recommendation and Plan  Planned Therapy Interventions (OT): activity tolerance training, adaptive equipment training, BADL retraining, occupation/activity based interventions, strengthening exercise, transfer/mobility retraining, functional balance retraining, ROM/therapeutic exercise, patient/caregiver education/training  Therapy Frequency (OT): daily  Plan of Care Review  Plan of Care Reviewed With: patient  Progress: improving  Outcome Evaluation: OT eval completed Pt presents with deficits in strength, balance, and activity tolerance compared to baseline for ADL/functional transfer completion, [place] dynamic standing balance progressed to CGA with HHA intermittently, SBA LBD, setup UBD, [place] bathing tasks, recom IPOT d/c IRF pending progress     Time Calculation:    Time Calculation- OT     Row Name 09/01/22 1109             Time Calculation- OT    OT Start Time 1109  -KF      OT Received On 09/01/22  -KF      OT Goal Re-Cert Due Date 09/11/22  -              Timed Charges    51330 - OT Self Care/Mgmt Minutes 10  -KF              Untimed Charges    OT Eval/Re-eval Minutes 47  -KF              Total Minutes    Timed Charges Total Minutes 10  -KF      Untimed Charges Total Minutes 47  -KF       Total Minutes 57  -KF            User Key  (r) = Recorded By, (t) = Taken By, (c) = Cosigned By    Initials Name  Provider Type    KF Angeles Boateng, OT Occupational Therapist              Therapy Charges for Today     Code Description Service Date Service Provider Modifiers Qty    [id number] HC OT SELF CARE/MGMT/TRAIN EA 15 MIN 9/1/2022 Angeles Boateng, OT GO 1    [id number]  OT EVAL MOD COMPLEXITY 4 9/1/2022 Angeles Boateng OT GO 1               Angeles Boateng OT  9/1/2022

## 2022-09-01 NOTE — PROGRESS NOTES
"    [facility]  PROGRESS NOTE    Patient Name: Keyon Olivas  : 1945  MRN: [id number]    Date of Admission: 2022  Primary Care Physician: Osman Olivas MD    Subjective   Subjective     CC:  Weakness and dyspnea    HPI:  Ostomy output has been higher than normal.  He has hd about 3 weeks of occasional lightheadedness donna when reaching up to a high shelf.  Usually imodium helps.  No abd pain or nausea/vomiting.  Lives alone so he doesn't eat well.  Had dry mouth on arrival but says this is better.     ROS:  Gen- No fevers, chills  CV- No chest pain, palpitations.  Notes that HR is usually 60s, but now 120 since \"they stopped my medicine\"   Resp- No cough.  Some dyspnea with standing  GI- No N/V/D, abd pain   - no history of hematuria but noted it yesterday along w severe penile pain w voiding.  Deneis nocturia.  Not sure about kidney stones       Objective   Objective     Vital Signs:   Temp:  [97.4 °F (36.3 °C)-98.2 °F (36.8 °C)] 98.2 °F (36.8 °C)  Heart Rate:  [] 71  Resp:  [16] 16  BP: (113-142)/(60-97) 115/60  Flow (L/min):  [2] 2     Physical Exam:  Constitutional: Awake, alert in chair, NAD and pleasant.  Stands unassisted beside chair, complains that it made him a little dyspneic  Eyes: PER, sclerae anicteric, no conjunctival injection  HENT: NCAT, mucous membranes moist  Neck: Supple,  trachea midline  Respiratory: Clear to auscultation bilaterally, nonlabored respirations   Cardiovascular: tachy RR, tele unclear if AFib or ST  Gastrointestinal: Positive bowel sounds, soft, nontender, nondistended, ostomy RLQ   Musculoskeletal: No bilateral ankle edema, no clubbing or cyanosis to extremities  Psychiatric: Appropriate affect, cooperative  Neurologic: Oriented x 3, strength symmetric in all extremities, Cranial Nerves grossly intact to confrontation, speech clear  Skin: No rashes   - no blood seen on penis, no tenderness or injury noted. "       Results Reviewed:  LAB RESULTS:      Lab 08/31/22 2032 08/31/22  1043   WBC  --  10.24   HEMOGLOBIN  --  14.2   HEMATOCRIT  --  42.5   PLATELETS  --  229   NEUTROS ABS  --  8.29*   IMMATURE GRANS (ABS)  --  0.07*   LYMPHS ABS  --  1.20   MONOS ABS  --  0.59   EOS ABS  --  0.06   MCV  --  85.5   LACTATE 1.3 2.6*         Lab 08/31/22  1043   SODIUM 135*   POTASSIUM 4.5   CHLORIDE 102   CO2 17.0*   ANION GAP 16.0*   BUN 28*   CREATININE 2.15*   EGFR 31.5*   GLUCOSE 125*   CALCIUM 10.6*   IONIZED CALCIUM 1.46*   MAGNESIUM 1.9   HEMOGLOBIN A1C 4.60*   TSH 2.080         Lab 08/31/22  1043   TOTAL PROTEIN 8.5   ALBUMIN 4.80   GLOBULIN 3.7   ALT (SGPT) 12   AST (SGOT) 19   BILIRUBIN 0.8   ALK PHOS 97         Lab 08/31/22 2032 08/31/22  1043   PROBNP  --  497.9   TROPONIN T <0.010 <0.010         Lab 08/31/22  1043   CHOLESTEROL 163   LDL CHOL 106*   HDL CHOL 37*   TRIGLYCERIDES 109             Brief Urine Lab Results  (Last result in the past 365 days)      Color   Clarity   Blood   Leuk Est   Nitrite   Protein   CREAT   Urine HCG        08/31/22 2120 Yellow   Clear   Large (3+)   Negative   Negative   Trace                 Microbiology Results Abnormal     Procedure Component Value - Date/Time    Gastrointestinal Panel, PCR - Stool, Per Stoma [[id number]]  (Normal) Collected: 08/31/22 1902    Lab Status: Final result Specimen: Stool from Per Stoma Updated: 08/31/22 2018     Campylobacter Not Detected     Plesiomonas shigelloides Not Detected     Salmonella Not Detected     Vibrio Not Detected     Vibrio cholerae Not Detected     Yersinia enterocolitica Not Detected     Enteroaggregative E. coli (EAEC) Not Detected     Enteropathogenic E. coli (EPEC) Not Detected     Enterotoxigenic E. coli (ETEC) lt/st Not Detected     Shiga-like toxin-producing E. coli (STEC) stx1/stx2 Not Detected     Shigella/Enteroinvasive E. coli (EIEC) Not Detected     Cryptosporidium Not Detected     Cyclospora cayetanensis Not Detected      Entamoeba histolytica Not Detected     Giardia lamblia Not Detected     Adenovirus F40/41 Not Detected     Astrovirus Not Detected     Norovirus GI/GII Not Detected     Rotavirus A Not Detected     Sapovirus (I, II, IV or V) Not Detected    COVID-19 and FLU A/B PCR - Swab, Nasopharynx [[id number]]  (Normal) Collected: 08/31/22 1114    Lab Status: Final result Specimen: Swab from Nasopharynx Updated: 08/31/22 1143     COVID19 Not Detected     Influenza A PCR Not Detected     Influenza B PCR Not Detected    Narrative:      Fact sheet for providers: https://www.fda.gov/media/702464/download    Fact sheet for patients: https://www.fda.gov/media/886534/download    Test performed by PCR.          Adult Transthoracic Echo Complete W/ Cont if Necessary Per Protocol    Result Date: 8/31/2022  · Left ventricular systolic function is normal. Estimated left ventricular EF = 50%. · Left ventricular wall thickness is consistent with mild concentric hypertrophy. · The aortic valve is calcified with trace to mild aortic insufficiency no significant stenosis. · Estimated right ventricular systolic pressure from tricuspid regurgitation is normal (<35 mmHg).      XR Chest 1 View    Result Date: 8/31/2022  XR CHEST 1 VW-  Date of Exam: 8/31/2022 10:33 AM  Indication: Weak/Dizzy/AMS triage protocol.  Comparison:?None available.  Technique:?A single view of the chest was obtained.  FINDINGS:  ?Patient status post median sternotomy.  Heart size and pulmonary vessels are within normal limits.  Lungs are clear bilaterally.  No pleural effusion identified.  No evidence pneumothorax.        Impression:  1. No acute cardiopulmonary disease.   This report was finalized on 8/31/2022 11:09 AM by Isac Padilla MD.        Results for orders placed during the hospital encounter of 08/31/22    Adult Transthoracic Echo Complete W/ Cont if Necessary Per Protocol    Interpretation Summary  · Left ventricular systolic function is normal. Estimated left  ventricular EF = 50%.  · Left ventricular wall thickness is consistent with mild concentric hypertrophy.  · The aortic valve is calcified with trace to mild aortic insufficiency no significant stenosis.  · Estimated right ventricular systolic pressure from tricuspid regurgitation is normal (<35 mmHg).      I have reviewed the medications:  Scheduled Meds:amLODIPine, 5 mg, Oral, Daily  aspirin, 81 mg, Oral, Daily  heparin (porcine), 5,000 Units, Subcutaneous, Q8H  isosorbide mononitrate, 30 mg, Oral, Q24H  metoprolol tartrate, 12.5 mg, Oral, BID  sodium chloride, 10 mL, Intravenous, Q12H      Continuous Infusions:sodium chloride, 100 mL/hr, Last Rate: 100 mL/hr (09/01/22 0502)      PRN Meds:.•  acetaminophen **OR** acetaminophen **OR** acetaminophen  •  ALPRAZolam  •  nitroglycerin  •  ondansetron **OR** ondansetron  •  sodium chloride  •  sodium chloride    Assessment & Plan   Assessment & Plan     Active Hospital Problems    Diagnosis  POA   • **Weakness [R53.1]  Yes   • ANNA MARIE (acute kidney injury) (HCC) [N17.9]  Yes   • Atrial fibrillation (HCC) [I48.91]  Unknown   • Coronary artery disease involving native coronary artery of native heart with angina pectoris (HCC) [I25.119]  Yes   • Essential hypertension [I10]  Yes   • Hyperlipidemia LDL goal <70 [E78.5]  Yes   • Ulcerative colitis (HCC) [K51.90]  Yes      Resolved Hospital Problems   No resolved problems to display.        Brief Hospital Course to date:  Keyon Olivas is a 76 y.o. male  with multivessel CAD s/p remote CABG now with blockages not amenable to intervention, ulcerative colitis s/p ileostomy 40 years ago.  He has had 2 days of increased stoma output with weakness and dyspnea.  Upon arrival to the floor, patient did have a brief episode of atrial fibrillation with rapid ventricular response.      Weakness  Dehydration  ANNA MARIE and mild metabolic acidosis   High ostomy output of unknown cause  -Suspect hypovolemic due to high output from ostomy  - GI PCR  unremarkable.  - no abd imaging done but exam unremarkable.  Consider imaging if fails to resolve   - start imodium  BID  - IV fluids - continue.      Hematuria  - new per patient  - source unclear - had ? Penile pain w voiding   - on ASA/Plavix  - UA not suggestive of UTI   - check PVR, watch for ongoing gross hematuria, await creat today     Mild elev calcium - likely from dehydration     Multivessel CAD  PAfib, recurrent episodes ongoing   -Seen by cardiology, no recs   -Echocardiogram with normal LVEF and mild LVH  -Continue ASA, IMDUR, amlodipine, metoprolol  -Hold ACE/ARB due to ANNA MARIE     Atrial fibrillation with RVR  -Recheck EKG, unclear if ST or AF curretnly  - increase metoprolol to 25 BID      Expected Discharge Location and Transportation: home by car  Expected Discharge Date: Fri-Sat.      DVT prophylaxis:  Medical DVT prophylaxis orders are present.          CODE STATUS:   Code Status and Medical Interventions:   Ordered at: 08/31/22 6279     Level Of Support Discussed With:    Patient     Code Status (Patient has no pulse and is not breathing):    CPR (Attempt to Resuscitate)     Medical Interventions (Patient has pulse or is breathing):    Full Support       Chen Payne MD  09/01/22

## 2022-09-01 NOTE — ED PROVIDER NOTES
"Subjective   76-year-old male presents for evaluation of generalized weakness, easy fatigability, and dyspnea with exertion.  Of note, the patient has a known history of coronary artery disease.  He states that he had a CABG approximately 20 years ago.  He was previously followed by Dr. Hylton here at our facility.  Currently, he is followed by a cardiologist in [place], [place].  He states that he had a recent heart cath that revealed \"numerous blockages\" not amenable to stents.  He was subsequently referred here to \"be admitted to the hospital.\"  He tells me that our cardiologists should be aware of him.  He denies any active chest pain at this time.  No vomiting.  No diaphoresis.  He states that his symptoms have not significantly worsened over the past several days.          Review of Systems   Constitutional: Positive for fatigue.   Respiratory: Positive for shortness of breath.    Neurological: Positive for weakness.   All other systems reviewed and are negative.      Past Medical History:   Diagnosis Date   • ANNA MARIE (acute kidney injury) ([facility]) 8/31/2022   • Anxiety and depression 03/09/2017   • Atrial fibrillation ([facility]) 8/31/2022   • Bilateral carotid artery stenosis 8/23/2022   • BPH (benign prostatic hyperplasia) 03/09/2017   • CAD (coronary artery disease) 03/09/2017   • COVID-19     02/2022   • COVID-19 vaccine administered     02/26/2021, 03/26/2021, 03/10/2022 - Moderna   • DJD (degenerative joint disease) 03/09/2017   • Dyslipidemia 03/09/2017   • Hepatitis C     History of hepatitis C; followed by Dr. Cash in [place].   • Hyperlipidemia    • Hypertension 03/09/2017   • Insomnia 03/09/2017   • Neuropathy    • DEACON (obstructive sleep apnea) 03/09/2017   • Poor short term memory 03/09/2017   • Small fiber neuropathy    • Ulcerative colitis (HCC) 03/09/2017       Allergies   Allergen Reactions   • Crestor [Rosuvastatin] Myalgia   • Lescol [Fluvastatin Sodium] Myalgia   • Lipitor [Atorvastatin] Myalgia   • " Niacin And Related Rash   • Penicillins Unknown - Low Severity       Past Surgical History:   Procedure Laterality Date   • CARDIAC CATHETERIZATION  2000   • COLECTOMY TOTAL      Ulcerative colitis, status post total colectomy with ileostomy.    • CORONARY ARTERY BYPASS GRAFT  05/11/2000    x3   • CORONARY STENT PLACEMENT  2006       Family History   Problem Relation Age of Onset   • Emphysema Mother    • Heart failure Mother    • Heart attack Father    • Memory loss Brother        Social History     Socioeconomic History   • Marital status:    Tobacco Use   • Smoking status: Former Smoker     Packs/day: 1.00     Years: 15.00     Pack years: 15.00     Types: Cigarettes     Start date: 1965     Quit date: 1983     Years since quittin.6   • Smokeless tobacco: Former User     Types: Chew   • Tobacco comment: smoked 10-20 years, < last 1 PPD   Vaping Use   • Vaping Use: Never used   Substance and Sexual Activity   • Alcohol use: No   • Drug use: No   • Sexual activity: Yes     Partners: Female     Birth control/protection: None           Objective   Physical Exam  Vitals and nursing note reviewed.   Constitutional:       General: He is not in acute distress.     Appearance: He is well-developed. He is not diaphoretic.   HENT:      Head: Normocephalic and atraumatic.   Neck:      Vascular: No JVD.   Cardiovascular:      Rate and Rhythm: Normal rate and regular rhythm.      Heart sounds: Normal heart sounds. No murmur heard.    No friction rub. No gallop.   Pulmonary:      Effort: Pulmonary effort is normal. No respiratory distress.      Breath sounds: Normal breath sounds. No wheezing or rales.   Abdominal:      General: Bowel sounds are normal. There is no distension.      Palpations: Abdomen is soft. There is no mass.      Tenderness: There is no abdominal tenderness. There is no guarding.   Musculoskeletal:         General: Normal range of motion.      Cervical back: Normal range of motion.  "  Skin:     General: Skin is warm and dry.      Coloration: Skin is not pale.      Findings: No erythema or rash.   Neurological:      Mental Status: He is alert and oriented to person, place, and time.   Psychiatric:         Thought Content: Thought content normal.         Judgment: Judgment normal.         Procedures           ED Course  ED Course as of 08/31/22 2055   Wed Aug 31, 2022   1118 76-year-old male presents for evaluation of generalized weakness and easy fatigability.  Of note, the patient has a history of coronary artery disease.  He had a prior bypass approximately 20 years ago.  The patient was previously followed by Dr. Hylton of cardiology here at our facility.  He is followed by cardiologist in [place], [place] and had a recent heart cath that revealed \"numerous blockages\" not amenable to stents.  He was subsequently referred here to \"be admitted.\"  He reports a significant decline in his energy level over the past several days.  He denies chest pain but states that he is getting quite winded with minimal exertion.  On arrival to the ED, the patient is nontoxic-appearing.  His initial EKG revealed normal sinus rhythm with sinus arrhythmia and a heart rate of 78 with nonspecific T wave abnormality noted to his anterior precordial leads.  We will obtain labs and imaging, and we will reassess following initial interventions. [DD]   1146 Labs remarkable for creatinine of 2.1 and mild hypercalcemia. [DD]   1147 I discussed the patient's case with Radha of cardiology.  She was already aware that the patient was coming to our facility and had been in touch with Dr. Fay.  She is requesting that we admit the patient to the hospitalist, and our cardiology team will consult. [DD]   1147 I discussed the patient's case with Dr. Briscoe, the patient will be admitted under his care for further evaluation and treatment.  The patient is aware/agreeable with the plan at this time. [DD]      ED Course User " Index  [DD] Bulmaro Valente MD                                       Recent Results (from the past 24 hour(s))   ECG 12 Lead    Collection Time: 08/31/22 10:30 AM   Result Value Ref Range    QT Interval 388 ms    QTC Interval 442 ms   Comprehensive Metabolic Panel    Collection Time: 08/31/22 10:43 AM    Specimen: Blood   Result Value Ref Range    Glucose 125 (H) 65 - 99 mg/dL    BUN 28 (H) 8 - 23 mg/dL    Creatinine 2.15 (H) 0.76 - 1.27 mg/dL    Sodium 135 (L) 136 - 145 mmol/L    Potassium 4.5 3.5 - 5.2 mmol/L    Chloride 102 98 - 107 mmol/L    CO2 17.0 (L) 22.0 - 29.0 mmol/L    Calcium 10.6 (H) 8.6 - 10.5 mg/dL    Total Protein 8.5 6.0 - 8.5 g/dL    Albumin 4.80 3.50 - 5.20 g/dL    ALT (SGPT) 12 1 - 41 U/L    AST (SGOT) 19 1 - 40 U/L    Alkaline Phosphatase 97 39 - 117 U/L    Total Bilirubin 0.8 0.0 - 1.2 mg/dL    Globulin 3.7 gm/dL    A/G Ratio 1.3 g/dL    BUN/Creatinine Ratio 13.0 7.0 - 25.0    Anion Gap 16.0 (H) 5.0 - 15.0 mmol/L    eGFR 31.5 (L) >60.0 mL/min/1.73   Troponin    Collection Time: 08/31/22 10:43 AM    Specimen: Blood   Result Value Ref Range    Troponin T <0.010 0.000 - 0.030 ng/mL   Magnesium    Collection Time: 08/31/22 10:43 AM    Specimen: Blood   Result Value Ref Range    Magnesium 1.9 1.6 - 2.4 mg/dL   Green Top (Gel)    Collection Time: 08/31/22 10:43 AM   Result Value Ref Range    Extra Tube Hold for add-ons.    Lavender Top    Collection Time: 08/31/22 10:43 AM   Result Value Ref Range    Extra Tube hold for add-on    Gold Top - SST    Collection Time: 08/31/22 10:43 AM   Result Value Ref Range    Extra Tube Hold for add-ons.    Gray Top    Collection Time: 08/31/22 10:43 AM   Result Value Ref Range    Extra Tube Hold for add-ons.    Light Blue Top    Collection Time: 08/31/22 10:43 AM   Result Value Ref Range    Extra Tube Hold for add-ons.    CBC Auto Differential    Collection Time: 08/31/22 10:43 AM    Specimen: Blood   Result Value Ref Range    WBC 10.24 3.40 - 10.80 10*3/mm3     RBC 4.97 4.14 - 5.80 10*6/mm3    Hemoglobin 14.2 13.0 - 17.7 g/dL    Hematocrit 42.5 37.5 - 51.0 %    MCV 85.5 79.0 - 97.0 fL    MCH 28.6 26.6 - 33.0 pg    MCHC 33.4 31.5 - 35.7 g/dL    RDW 13.3 12.3 - 15.4 %    RDW-SD 41.1 37.0 - 54.0 fl    MPV 9.4 6.0 - 12.0 fL    Platelets 229 140 - 450 10*3/mm3    Neutrophil % 80.9 (H) 42.7 - 76.0 %    Lymphocyte % 11.7 (L) 19.6 - 45.3 %    Monocyte % 5.8 5.0 - 12.0 %    Eosinophil % 0.6 0.3 - 6.2 %    Basophil % 0.3 0.0 - 1.5 %    Immature Grans % 0.7 (H) 0.0 - 0.5 %    Neutrophils, Absolute 8.29 (H) 1.70 - 7.00 10*3/mm3    Lymphocytes, Absolute 1.20 0.70 - 3.10 10*3/mm3    Monocytes, Absolute 0.59 0.10 - 0.90 10*3/mm3    Eosinophils, Absolute 0.06 0.00 - 0.40 10*3/mm3    Basophils, Absolute 0.03 0.00 - 0.20 10*3/mm3    Immature Grans, Absolute 0.07 (H) 0.00 - 0.05 10*3/mm3    nRBC 0.0 0.0 - 0.2 /100 WBC   T4, Free    Collection Time: 08/31/22 10:43 AM    Specimen: Blood   Result Value Ref Range    Free T4 1.47 0.93 - 1.70 ng/dL   TSH    Collection Time: 08/31/22 10:43 AM    Specimen: Blood   Result Value Ref Range    TSH 2.080 0.270 - 4.200 uIU/mL   Hemoglobin A1c    Collection Time: 08/31/22 10:43 AM    Specimen: Blood   Result Value Ref Range    Hemoglobin A1C 4.60 (L) 4.80 - 5.60 %   Lipid Panel    Collection Time: 08/31/22 10:43 AM    Specimen: Blood   Result Value Ref Range    Total Cholesterol 163 0 - 200 mg/dL    Triglycerides 109 0 - 150 mg/dL    HDL Cholesterol 37 (L) 40 - 60 mg/dL    LDL Cholesterol  106 (H) 0 - 100 mg/dL    VLDL Cholesterol 20 5 - 40 mg/dL    LDL/HDL Ratio 2.82    proBNP    Collection Time: 08/31/22 10:43 AM    Specimen: Blood   Result Value Ref Range    proBNP 497.9 0.0 - 900.0 pg/mL   Calcium, Ionized    Collection Time: 08/31/22 10:43 AM    Specimen: Blood   Result Value Ref Range    Ionized Calcium 1.46 (H) 1.12 - 1.32 mmol/L   Lactic Acid, Plasma    Collection Time: 08/31/22 10:43 AM    Specimen: Blood   Result Value Ref Range    Lactate 2.6  (C) 0.5 - 2.0 mmol/L   COVID-19 and FLU A/B PCR - Swab, Nasopharynx    Collection Time: 08/31/22 11:14 AM    Specimen: Nasopharynx; Swab   Result Value Ref Range    COVID19 Not Detected Not Detected - Ref. Range    Influenza A PCR Not Detected Not Detected    Influenza B PCR Not Detected Not Detected   Adult Transthoracic Echo Complete W/ Cont if Necessary Per Protocol    Collection Time: 08/31/22 12:30 PM   Result Value Ref Range    Target HR (85%) 124 bpm    Max. Pred. HR (100%) 146 bpm    RV S' 6.2 cm/sec    RV Base 3.1 cm    RV Length 6.0 cm    RV Mid 2.30 cm    LA ESV Index (BP) 27.4 ml/m2    Avg E/e' ratio 4.18     Ao root diam 3.8 cm    Ao pk celestino 104.5 cm/sec    Ao V2 VTI 20.1 cm    NANCY(I,D) 2.6 cm2    EDV(cubed) 85.2 ml    EDV(MOD-sp2) 44.9 ml    EDV(MOD-sp4) 49.4 ml    EF(MOD-bp) 45.2 %    EF(MOD-sp2) 43.4 %    EF(MOD-sp4) 51.8 %    ESV(cubed) 32.8 ml    ESV(MOD-sp2) 25.4 ml    ESV(MOD-sp4) 23.8 ml    IVS/LVPW 1.00 cm    Lat Peak E' Celestino 10.3 cm/sec    LV mass(C)d 191.3 grams    LV V1 max PG 2.7 mmHg    LV V1 mean PG 1.00 mmHg    LV V1 max 81.5 cm/sec    LVPWd 1.20 cm    Med Peak E' Celestino 7.3 cm/sec    MR max PG 79.6 mmHg    MR mean PG 50.0 mmHg    MR mean celestino 336.0 cm/sec    MR .0 cm    MV dec slope 139.0 cm/sec2    MV dec time 0.27 msec    MV P1/2t 86.2 msec    PA acc time 0.09 sec    PA pr(Accel) 36.7 mmHg    PA V2 max 89.7 cm/sec    PI end-d celestino 90.9 cm/sec    RAP systole 3 mmHg    RVSP(TR) 18 mmHg    SV(LVOT) 52.6 ml    SV(MOD-sp2) 19.5 ml    SV(MOD-sp4) 25.6 ml    TR max PG 15 mmHg    Ao max PG 4.4 mmHg    Ao mean PG 2.00 mmHg    FS 27.3 %    IVSd 1.20 cm    LA dimension (2D)  2.8 cm    LV V1 VTI 15.2 cm    LVIDd 4.4 cm    LVIDs 3.2 cm    LVOT area 3.5 cm2    LVOT diam 2.10 cm    MV E/A 0.54     MVA(P1/2t) 2.6 cm2    MR max celestino 446.0 cm/sec    MV A max celestino 67.7 cm/sec    MV E max celestino 36.8 cm/sec    TR max celestino 185.8 cm/sec    TAPSE (>1.6) 1.30 cm    Echo EF Estimated 50 %   ECG 12 Lead     Collection Time: 08/31/22  6:38 PM   Result Value Ref Range    QT Interval 318 ms    QTC Interval 426 ms   Gastrointestinal Panel, PCR - Stool, Per St[facility]    Collection Time: 08/31/22  7:02 PM    Specimen: Per St[facility]; Stool   Result Value Ref Range    Campylobacter Not Detected Not Detected    Plesiomonas shigelloides Not Detected Not Detected    Salmonella Not Detected Not Detected    Vibrio Not Detected Not Detected    Vibrio cholerae Not Detected Not Detected    Yersinia enterocolitica Not Detected Not Detected    Enteroaggregative E. coli (EAEC) Not Detected Not Detected    Enteropathogenic E. coli (EPEC) Not Detected Not Detected    Enterotoxigenic E. coli (ETEC) lt/st Not Detected Not Detected    Shiga-like toxin-producing E. coli (STEC) stx1/stx2 Not Detected Not Detected    Shigella/Enteroinvasive E. coli (EIEC) Not Detected Not Detected    Cryptosporidium Not Detected Not Detected    Cyclospora cayetanensis Not Detected Not Detected    Entamoeba histolytica Not Detected Not Detected    Giardia lamblia Not Detected Not Detected    Adenovirus F40/41 Not Detected Not Detected    Astrovirus Not Detected Not Detected    Norovirus GI/GII Not Detected Not Detected    Rotavirus A Not Detected Not Detected    Sapovirus (I, II, IV or V) Not Detected Not Detected     Note: In addition to lab results from this visit, the labs listed above may include labs taken at another facility or during a different encounter within the last 24 hours. Please correlate lab times with ED admission and discharge times for further clarification of the services performed during this visit.    XR Chest 1 View   Final Result       1. No acute cardiopulmonary disease.           This report was finalized on 8/31/2022 11:09 AM by Isac Padilla MD.            Vitals:    08/31/22 1844 08/31/22 1845 08/31/22 1846 08/31/22 1900   BP:       BP Location:       Patient Position:       Pulse: (!) 141 (!) 131 116 94   Resp:       Temp:       TempSrc:        SpO2: 98% 97% 99% 100%   Weight:       Height:         Medications   sodium chloride 0.9 % flush 10 mL (has no administration in time range)   amLODIPine (NORVASC) tablet 5 mg (5 mg Oral Given 8/31/22 2005)   aspirin EC tablet 81 mg (81 mg Oral Given 8/31/22 2005)   isosorbide mononitrate (IMDUR) 24 hr tablet 30 mg (has no administration in time range)   metoprolol tartrate (LOPRESSOR) half tablet 12.5 mg (12.5 mg Oral Given 8/31/22 2005)   nitroglycerin (NITROSTAT) SL tablet 0.4 mg (has no administration in time range)   ALPRAZolam (XANAX) tablet 1 mg (1 mg Oral Given 8/31/22 2005)   sodium chloride 0.9 % flush 10 mL (10 mL Intravenous Given 8/31/22 2006)   sodium chloride 0.9 % flush 10 mL (has no administration in time range)   heparin (porcine) 5000 UNIT/ML injection 5,000 Units ( Subcutaneous Canceled Entry 8/31/22 2200)   sodium chloride 0.9 % infusion (100 mL/hr Intravenous New Bag 8/31/22 1910)   acetaminophen (TYLENOL) tablet 650 mg (has no administration in time range)     Or   acetaminophen (TYLENOL) 160 MG/5ML solution 650 mg (has no administration in time range)     Or   acetaminophen (TYLENOL) suppository 650 mg (has no administration in time range)   ondansetron (ZOFRAN) tablet 4 mg (has no administration in time range)     Or   ondansetron (ZOFRAN) injection 4 mg (has no administration in time range)     ECG/EMG Results (last 24 hours)     Procedure Component Value Units Date/Time    Adult Transthoracic Echo Complete W/ Cont if Necessary Per Protocol [[id number]] Resulted: 08/31/22 1305     Updated: 08/31/22 1319     Target HR (85%) 124 bpm      Max. Pred. HR (100%) 146 bpm      RV S' 6.2 cm/sec      RV Base 3.1 cm      RV Length 6.0 cm      RV Mid 2.30 cm      LA ESV Index (BP) 27.4 ml/m2      Avg E/e' ratio 4.18     Ao root diam 3.8 cm      Ao pk ed 104.5 cm/sec      Ao V2 VTI 20.1 cm      NANYC(I,D) 2.6 cm2      EDV(cubed) 85.2 ml      EDV(MOD-sp2) 44.9 ml      EDV(MOD-sp4) 49.4 ml       EF(MOD-bp) 45.2 %      EF(MOD-sp2) 43.4 %      EF(MOD-sp4) 51.8 %      ESV(cubed) 32.8 ml      ESV(MOD-sp2) 25.4 ml      ESV(MOD-sp4) 23.8 ml      IVS/LVPW 1.00 cm      Lat Peak E' Celestino 10.3 cm/sec      LV mass(C)d 191.3 grams      LV V1 max PG 2.7 mmHg      LV V1 mean PG 1.00 mmHg      LV V1 max 81.5 cm/sec      LVPWd 1.20 cm      Med Peak E' Celestino 7.3 cm/sec      MR max PG 79.6 mmHg      MR mean PG 50.0 mmHg      MR mean celestino 336.0 cm/sec      MR .0 cm      MV dec slope 139.0 cm/sec2      MV dec time 0.27 msec      MV P1/2t 86.2 msec      PA acc time 0.09 sec      PA pr(Accel) 36.7 mmHg      PA V2 max 89.7 cm/sec      PI end-d celestino 90.9 cm/sec      RAP systole 3 mmHg      RVSP(TR) 18 mmHg      SV(LVOT) 52.6 ml      SV(MOD-sp2) 19.5 ml      SV(MOD-sp4) 25.6 ml      TR max PG 15 mmHg      Ao max PG 4.4 mmHg      Ao mean PG 2.00 mmHg      FS 27.3 %      IVSd 1.20 cm      LA dimension (2D)  2.8 cm      LV V1 VTI 15.2 cm      LVIDd 4.4 cm      LVIDs 3.2 cm      LVOT area 3.5 cm2      LVOT diam 2.10 cm      MV E/A 0.54     MVA(P1/2t) 2.6 cm2      MR max celestino 446.0 cm/sec      MV A max celestino 67.7 cm/sec      MV E max celestino 36.8 cm/sec      TR max celestino 185.8 cm/sec      TAPSE (>1.6) 1.30 cm      Echo EF Estimated 50 %     Narrative:      · Left ventricular systolic function is normal. Estimated left ventricular   EF = 50%.  · Left ventricular wall thickness is consistent with mild concentric   hypertrophy.  · The aortic valve is calcified with trace to mild aortic insufficiency no   significant stenosis.  · Estimated right ventricular systolic pressure from tricuspid   regurgitation is normal (<35 mmHg).       ECG 12 Lead [[id number]] Collected: 08/31/22 1030     Updated: 08/31/22 7153     QT Interval 388 ms      QTC Interval 442 ms     Narrative:      Test Reason : Weak/Dizzy/AMS protocol  Blood Pressure :   */*   mmHG  Vent. Rate :  78 BPM     Atrial Rate :  78 BPM     P-R Int : 144 ms          QRS Dur :  90 ms      QT Int :  388 ms       P-R-T Axes :  36  11  49 degrees     QTc Int : 442 ms    Normal sinus rhythm with sinus arrhythmia  Normal ECG  When compared with ECG of 18-SEP-2010 04:53,  No significant change was found  Confirmed by MD Valente Michael [id number]) on 8/31/2022 5:37:07 PM    Referred By:            Confirmed By: Keyon Valente MD    ECG 12 Lead [[id number]] Collected: 08/31/22 1838     Updated: 08/31/22 1847     QT Interval 318 ms      QTC Interval 426 ms     Narrative:      Test Reason : rhythm change  Blood Pressure :   */*   mmHG  Vent. Rate : 108 BPM     Atrial Rate :   * BPM     P-R Int :   * ms          QRS Dur :  88 ms      QT Int : 318 ms       P-R-T Axes :   *  29  75 degrees     QTc Int : 426 ms    ** Critical Test Result: High HR  Atrial fibrillation with rapid ventricular response  Nonspecific ST and T wave abnormality  Abnormal ECG  When compared with ECG of 31-AUG-2022 10:30,  Atrial fibrillation has replaced Sinus rhythm  Nonspecific T wave abnormality, worse in Anterior leads    Referred By:            Confirmed By:         ECG 12 Lead   Preliminary Result   Test Reason : rhythm change   Blood Pressure :   */*   mmHG   Vent. Rate : 108 BPM     Atrial Rate :   * BPM      P-R Int :   * ms          QRS Dur :  88 ms       QT Int : 318 ms       P-R-T Axes :   *  29  75 degrees      QTc Int : 426 ms      ** Critical Test Result: High HR   Atrial fibrillation with rapid ventricular response   Nonspecific ST and T wave abnormality   Abnormal ECG   When compared with ECG of 31-AUG-2022 10:30,   Atrial fibrillation has replaced Sinus rhythm   Nonspecific T wave abnormality, worse in Anterior leads      Referred By:            Confirmed By:       ECG 12 Lead   Final Result   Test Reason : Weak/Dizzy/AMS protocol   Blood Pressure :   */*   mmHG   Vent. Rate :  78 BPM     Atrial Rate :  78 BPM      P-R Int : 144 ms          QRS Dur :  90 ms       QT Int : 388 ms       P-R-T Axes :  36  11  49 degrees      QTc Int : 442 ms       Normal sinus rhythm with sinus arrhythmia   Normal ECG   When compared with ECG of 18-SEP-2010 04:53,   No significant change was found   Confirmed by MD Valente Michael ([id number]) on 8/31/2022 5:37:07 PM      Referred By:            Confirmed By: Keyon Valente MD                 [facility]    Final diagnoses:   Generalized weakness   Dyspnea on exertion   History of coronary artery disease   Acute kidney injury (HCC)   Hypercalcemia       ED Disposition  ED Disposition     ED Disposition   Decision to Admit    Condition   --    Comment   Level of Care: Telemetry [5]   Diagnosis: CAD (coronary artery disease) [611435]   Certification: I Certify That Inpatient Hospital Services Are Medically Necessary For Greater Than 2 Midnights               No follow-up provider specified.       Medication List      No changes were made to your prescriptions during this visit.          Bulmaro Valente MD  08/31/22 9309

## 2022-09-01 NOTE — PLAN OF CARE
Goal Outcome Evaluation:  Plan of Care Reviewed With: patient        Progress: improving  Outcome Evaluation: OT eval completed Pt presents with deficits in strength, balance, and activity tolerance compared to baseline for ADL/functional transfer completion, Carmella dynamic standing balance progressed to CGA with HHA intermittently, SBA LBD, setup UBD, Carmella bathing tasks, recom IPOT d/c IRF pending progress and may benefit from AD for balance pending progress

## 2022-09-02 VITALS
WEIGHT: 160 LBS | DIASTOLIC BLOOD PRESSURE: 77 MMHG | RESPIRATION RATE: 15 BRPM | BODY MASS INDEX: 25.71 KG/M2 | HEIGHT: 66 IN | OXYGEN SATURATION: 100 % | HEART RATE: 69 BPM | TEMPERATURE: 97.7 F | SYSTOLIC BLOOD PRESSURE: 119 MMHG

## 2022-09-02 PROBLEM — N17.9 AKI (ACUTE KIDNEY INJURY) (HCC): Status: RESOLVED | Noted: 2022-08-31 | Resolved: 2022-09-02

## 2022-09-02 PROBLEM — R53.1 WEAKNESS: Status: RESOLVED | Noted: 2022-08-31 | Resolved: 2022-09-02

## 2022-09-02 LAB
ANION GAP SERPL CALCULATED.3IONS-SCNC: 10 MMOL/L (ref 5–15)
BUN SERPL-MCNC: 33 MG/DL (ref 8–23)
BUN/CREAT SERPL: 25.4 (ref 7–25)
CALCIUM SPEC-SCNC: 9.5 MG/DL (ref 8.6–10.5)
CHLORIDE SERPL-SCNC: 112 MMOL/L (ref 98–107)
CO2 SERPL-SCNC: 16 MMOL/L (ref 22–29)
CREAT SERPL-MCNC: 1.3 MG/DL (ref 0.76–1.27)
DEPRECATED RDW RBC AUTO: 41.4 FL (ref 37–54)
EGFRCR SERPLBLD CKD-EPI 2021: 57.6 ML/MIN/1.73
ERYTHROCYTE [DISTWIDTH] IN BLOOD BY AUTOMATED COUNT: 13.5 % (ref 12.3–15.4)
GLUCOSE SERPL-MCNC: 96 MG/DL (ref 65–99)
HCT VFR BLD AUTO: 36 % (ref 37.5–51)
HGB BLD-MCNC: 12.4 G/DL (ref 13–17.7)
MAGNESIUM SERPL-MCNC: 1.9 MG/DL (ref 1.6–2.4)
MCH RBC QN AUTO: 29 PG (ref 26.6–33)
MCHC RBC AUTO-ENTMCNC: 34.4 G/DL (ref 31.5–35.7)
MCV RBC AUTO: 84.3 FL (ref 79–97)
PLATELET # BLD AUTO: 197 10*3/MM3 (ref 140–450)
PMV BLD AUTO: 9.6 FL (ref 6–12)
POTASSIUM SERPL-SCNC: 4.8 MMOL/L (ref 3.5–5.2)
QT INTERVAL: 384 MS
QTC INTERVAL: 417 MS
RBC # BLD AUTO: 4.27 10*6/MM3 (ref 4.14–5.8)
SODIUM SERPL-SCNC: 138 MMOL/L (ref 136–145)
WBC NRBC COR # BLD: 9.21 10*3/MM3 (ref 3.4–10.8)

## 2022-09-02 PROCEDURE — G0378 HOSPITAL OBSERVATION PER HR: HCPCS

## 2022-09-02 PROCEDURE — 96372 THER/PROPH/DIAG INJ SC/IM: CPT

## 2022-09-02 PROCEDURE — 85027 COMPLETE CBC AUTOMATED: CPT | Performed by: INTERNAL MEDICINE

## 2022-09-02 PROCEDURE — 97162 PT EVAL MOD COMPLEX 30 MIN: CPT

## 2022-09-02 PROCEDURE — 25010000002 HEPARIN (PORCINE) PER 1000 UNITS: Performed by: INTERNAL MEDICINE

## 2022-09-02 PROCEDURE — 97530 THERAPEUTIC ACTIVITIES: CPT

## 2022-09-02 PROCEDURE — 80048 BASIC METABOLIC PNL TOTAL CA: CPT | Performed by: INTERNAL MEDICINE

## 2022-09-02 PROCEDURE — 83735 ASSAY OF MAGNESIUM: CPT | Performed by: INTERNAL MEDICINE

## 2022-09-02 RX ORDER — LOPERAMIDE HYDROCHLORIDE 2 MG/1
2 CAPSULE ORAL 2 TIMES DAILY
Qty: 30 CAPSULE
Start: 2022-09-02

## 2022-09-02 RX ADMIN — METOPROLOL TARTRATE 25 MG: 25 TABLET, FILM COATED ORAL at 08:23

## 2022-09-02 RX ADMIN — ISOSORBIDE MONONITRATE 30 MG: 30 TABLET, EXTENDED RELEASE ORAL at 08:22

## 2022-09-02 RX ADMIN — ASPIRIN 81 MG: 81 TABLET, COATED ORAL at 08:22

## 2022-09-02 RX ADMIN — LOPERAMIDE HYDROCHLORIDE 2 MG: 2 CAPSULE ORAL at 05:18

## 2022-09-02 RX ADMIN — HEPARIN SODIUM 5000 UNITS: 5000 INJECTION INTRAVENOUS; SUBCUTANEOUS at 05:18

## 2022-09-02 RX ADMIN — CLOPIDOGREL BISULFATE 75 MG: 75 TABLET ORAL at 08:22

## 2022-09-02 RX ADMIN — AMLODIPINE BESYLATE 5 MG: 5 TABLET ORAL at 08:23

## 2022-09-02 NOTE — PLAN OF CARE
Goal Outcome Evaluation:  Plan of Care Reviewed With: patient           Outcome Evaluation: PT evaluation complete. The patient required supervision for sit to stand transfer without AD. Patient ambulated 250 feet without AD and forward flexed posture. Patient without loss of balance during ambulation and no increased work of breathing. Patient with improvement in functional mobility compared to OT evaluation yesterday. Patient would continue to benefit from skilled PT to progress activity tolerance and improve functional mobility. At hospital discharge recommend home with family assist and HHPT. No DME needs at this time.

## 2022-09-02 NOTE — CASE MANAGEMENT/SOCIAL WORK
Case Management Discharge Note      Final Note: Spoke w/pt in room regarding PT recs of home w/HH.  Pt declined services @ this time. Pt stated that he has family close that assist as he  needs. Family will transport. No further d/c needs @ this time.         Selected Continued Care - Admitted Since 8/31/2022     Destination    No services have been selected for the patient.              Durable Medical Equipment    No services have been selected for the patient.              Dialysis/Infusion    No services have been selected for the patient.              Home Medical Care    No services have been selected for the patient.              Therapy    No services have been selected for the patient.              Community Resources    No services have been selected for the patient.              Community & DME    No services have been selected for the patient.                       Final Discharge Disposition Code: 01 - home or self-care

## 2022-09-02 NOTE — THERAPY EVALUATION
Patient Name: Keyon Olivas  : 1945    MRN: [id number]                              Today's Date: 2022       Admit Date: 2022    Visit Dx:     ICD-10-CM ICD-9-CM   1. Generalized weakness  R53.1 780.79   2. Dyspnea on exertion  R06.00 786.09   3. History of coronary artery disease  Z86.79 V12.59   4. Acute kidney injury (HCC)  N17.9 584.9   5. Hypercalcemia  E83.52 275.42     Patient Active Problem List   Diagnosis   • Coronary artery disease involving native coronary artery of native heart with angina pectoris (HCC)   • Essential hypertension   • Hyperlipidemia LDL goal <70   • DJD (degenerative joint disease)   • Anxiety and depression   • BPH (benign prostatic hyperplasia)   • Ulcerative colitis (HCC)   • DEACON (obstructive sleep apnea)   • Insomnia   • Cervical stenosis of spinal canal   • Neuropathy   • Bilateral carotid artery stenosis   • Weakness   • ANNA MARIE (acute kidney injury) (HCC)   • Atrial fibrillation (HCC)     Past Medical History:   Diagnosis Date   • ANNA MARIE (acute kidney injury) (HCC) 2022   • Anxiety and depression 2017   • Atrial fibrillation (HCC) 2022   • Bilateral carotid artery stenosis 2022   • BPH (benign prostatic hyperplasia) 2017   • CAD (coronary artery disease) 2017   • COVID-19     2022   • COVID-19 vaccine administered     2021, 2021, 03/10/2022 - Moderna   • DJD (degenerative joint disease) 2017   • Dyslipidemia 2017   • Hepatitis C     History of hepatitis C; followed by Dr. Cash in [place].   • Hyperlipidemia    • Hypertension 2017   • Insomnia 2017   • Neuropathy    • DEACON (obstructive sleep apnea) 2017   • Poor short term memory 2017   • Small fiber neuropathy    • Ulcerative colitis (HCC) 2017     Past Surgical History:   Procedure Laterality Date   • CARDIAC CATHETERIZATION  2000   • COLECTOMY TOTAL      Ulcerative colitis, status post total colectomy with ileostomy.    •  CORONARY ARTERY BYPASS GRAFT  05/11/2000    x3   • CORONARY STENT PLACEMENT  08/2006      General Information     Row Name 09/02/22 0903          Physical Therapy Time and Intention    Document Type evaluation  -ML     Mode of Treatment physical therapy  -ML     Row Name 09/02/22 0903          General Information    Patient Profile Reviewed yes  -ML     Prior Level of Function independent:;all household mobility;community mobility;gait;transfer;ADL's  -ML     Existing Precautions/Restrictions fall  -ML     Barriers to Rehab medically complex  -ML     Row Name 09/02/22 0903          Living Environment    People in Home alone;other (see comments)  patient's brother lives nearby and checks on him  -ML     Row Name 09/02/22 0903          Home Main Entrance    Number of Stairs, Main Entrance none  -ML     Row Name 09/02/22 0903          Stairs Within Home, Primary    Stairs, Within Home, Primary single level home with basement, laundry in basement  -ML     Number of Stairs, Within Home, Primary twelve  -ML     Stair Railings, Within Home, Primary railing on right side (ascending)  -ML     Row Name 09/02/22 0903          Cognition    Orientation Status (Cognition) oriented x 4  -ML     Row Name 09/02/22 0903          Safety Issues, Functional Mobility    Impairments Affecting Function (Mobility) balance;endurance/activity tolerance;strength  -ML           User Key  (r) = Recorded By, (t) = Taken By, (c) = Cosigned By    Initials Name Provider Type    ML Sabine Vazquez Physical Therapist               Mobility     Row Name 09/02/22 0904          Bed Mobility    Bed Mobility supine-sit  -ML     Supine-Sit [place] (Bed Mobility) standby assist  -ML     Assistive Device (Bed Mobility) head of bed elevated  -ML     Row Name 09/02/22 0904          Sit-Stand Transfer    Sit-Stand [place] (Transfers) supervision  -ML     Assistive Device (Sit-Stand Transfers) other (see comments)  without UE support  -     Row Name  09/02/22 0904          Gait/Stairs (Locomotion)    [place] Level (Gait) supervision  -ML     Assistive Device (Gait) other (see comments)  no AD  -ML     Distance in Feet (Gait) 250  -ML     Bilateral Gait Deviations forward flexed posture  -ML     Comment, (Gait/Stairs) Patient ambulated without gait deficits and no loss of balance. Patient without increased work of breathing during ambulation.  -ML           User Key  (r) = Recorded By, (t) = Taken By, (c) = Cosigned By    Initials Name Provider Type    ML Sabine Vazquez Physical Therapist               Obj/Interventions     Row Name 09/02/22 0907          Range of Motion Comprehensive    General Range of Motion bilateral lower extremity ROM WFL  -ML     Row Name 09/02/22 0907          Strength Comprehensive (MMT)    Comment, General Manual Muscle Testing (MMT) Assessment BLE at least 3+/5  -ML     Row Name 09/02/22 0907          Balance    Balance Assessment sitting static balance;sitting dynamic balance;sit to stand dynamic balance;standing static balance;standing dynamic balance  -ML     Static Sitting Balance standby assist  -ML     Dynamic Sitting Balance standby assist  -ML     Position, Sitting Balance unsupported;sitting edge of bed  -ML     Sit to Stand Dynamic Balance supervision  -ML     Static Standing Balance supervision  -ML     Dynamic Standing Balance supervision  -ML     Position/Device Used, Standing Balance unsupported  -ML     Balance Interventions sitting;standing;sit to stand;supported;static;dynamic;minimal challenge;occupation based/functional task  -ML     Row Name 09/02/22 0907          Sensory Assessment (Somatosensory)    Sensory Assessment (Somatosensory) sensation intact  -ML           User Key  (r) = Recorded By, (t) = Taken By, (c) = Cosigned By    Initials Name Provider Type    ML Sabine Vazquez Physical Therapist               Goals/Plan     Row Name 09/02/22 0911          Transfer Goal 1 (PT)    Activity/Assistive Device  (Transfer Goal 1, PT) transfers, all;sit-to-stand/stand-to-sit;bed-to-chair/chair-to-bed  -ML     [place] Level/Cues Needed (Transfer Goal 1, PT) independent  -ML     Time Frame (Transfer Goal 1, PT) 10 days  -ML     Row Name 09/02/22 0911          Gait Training Goal 1 (PT)    Activity/Assistive Device (Gait Training Goal 1, PT) gait (walking locomotion);improve balance and speed;increase endurance/gait distance  -ML     [place] Level (Gait Training Goal 1, PT) independent  -ML     Distance (Gait Training Goal 1, PT) 500  -ML     Time Frame (Gait Training Goal 1, PT) 10 days  -ML     Row Name 09/02/22 0911          Stairs Goal 1 (PT)    Activity/Assistive Device (Stairs Goal 1, PT) ascending stairs;descending stairs;using handrail, right;step-to-step  -ML     [place] Level/Cues Needed (Stairs Goal 1, PT) modified independence  -ML     Number of Stairs (Stairs Goal 1, PT) 12  -ML     Time Frame (Stairs Goal 1, PT) 10 days  -ML     Row Name 09/02/22 0911          Therapy Assessment/Plan (PT)    Planned Therapy Interventions (PT) balance training;bed mobility training;gait training;home exercise program;patient/family education;transfer training;postural re-education;strengthening;stair training  -ML           User Key  (r) = Recorded By, (t) = Taken By, (c) = Cosigned By    Initials Name Provider Type     Sabine Vazquez Physical Therapist               Clinical Impression     Row Name 09/02/22 0908          Pain    Pretreatment Pain Rating 0/10 - no pain  -ML     Posttreatment Pain Rating 0/10 - no pain  -ML     Row Name 09/02/22 0908          Plan of Care Review    Plan of Care Reviewed With patient  -ML     Outcome Evaluation PT evaluation complete. The patient required supervision for sit to stand transfer without AD. Patient ambulated 250 feet without AD and forward flexed posture. Patient without loss of balance during ambulation and no increased work of breathing. Patient with improvement in  functional mobility compared to OT evaluation yesterday. Patient would continue to benefit from skilled PT to progress activity tolerance and improve functional mobility. At hospital discharge recommend home with family assist and HHPT. No DME needs at this time.  -ML     Row Name 09/02/22 0908          Therapy Assessment/Plan (PT)    Patient/Family Therapy Goals Statement (PT) return home  -ML     Rehab Potential (PT) good, to achieve stated therapy goals  -ML     Criteria for Skilled Interventions Met (PT) yes;meets criteria;skilled treatment is necessary  -ML     Therapy Frequency (PT) daily  -ML     Row Name 09/02/22 0908          Vital Signs    Pre Systolic BP Rehab 119  -ML     Pre Treatment Diastolic BP 77  -ML     Pretreatment Heart Rate (beats/min) 84  -ML     Pre SpO2 (%) 97  -ML     O2 Delivery Pre Treatment room air  -ML     Pre Patient Position Supine  -ML     Intra Patient Position Standing  -ML     Post Patient Position Sitting  -ML     Row Name 09/02/22 0908          Positioning and Restraints    Pre-Treatment Position in bed  -ML     Post Treatment Position chair  -ML     In Chair notified nsg;reclined;call light within reach;encouraged to call for assist;exit alarm on;waffle cushion;legs elevated  -ML           User Key  (r) = Recorded By, (t) = Taken By, (c) = Cosigned By    Initials Name Provider Type    Sabine Zambrano Physical Therapist               Outcome Measures     Row Name 09/02/22 0914          How much help from another person do you currently need...    Turning from your back to your side while in flat bed without using bedrails? 4  -ML     Moving from lying on back to sitting on the side of a flat bed without bedrails? 4  -ML     Moving to and from a bed to a chair (including a wheelchair)? 4  -ML     Standing up from a chair using your arms (e.g., wheelchair, bedside chair)? 4  -ML     Climbing 3-5 steps with a railing? 3  -ML     To walk in hospital room? 3  -ML     AM-PAC 6 Clicks  Score (PT) 22  -ML     Highest level of mobility 7 --> Walked 25 feet or more  -ML     Row Name 09/02/22 0914          Functional Assessment    Outcome Measure Options AM-PAC 6 Clicks Basic Mobility (PT)  -ML           User Key  (r) = Recorded By, (t) = Taken By, (c) = Cosigned By    Initials Name Provider Type     Saibne Vazquez Physical Therapist                             Physical Therapy Education                 Title: PT OT SLP Therapies (In Progress)     Topic: Physical Therapy (Done)     Point: Mobility training (Done)     Learning Progress Summary           Patient Acceptance, E, VU by ML at 9/2/2022 0914                   Point: Home exercise program (Done)     Learning Progress Summary           Patient Acceptance, E, VU by ML at 9/2/2022 0914                   Point: Body mechanics (Done)     Learning Progress Summary           Patient Acceptance, E, VU by ML at 9/2/2022 0914                   Point: Precautions (Done)     Learning Progress Summary           Patient Acceptance, E, VU by ML at 9/2/2022 0914                               User Key     Initials Effective Dates Name Provider Type [facility] 04/22/21 -  Sabine Vazquez Physical Therapist PT              PT Recommendation and Plan  Planned Therapy Interventions (PT): balance training, bed mobility training, gait training, home exercise program, patient/family education, transfer training, postural re-education, strengthening, stair training  Plan of Care Reviewed With: patient  Outcome Evaluation: PT evaluation complete. The patient required supervision for sit to stand transfer without AD. Patient ambulated 250 feet without AD and forward flexed posture. Patient without loss of balance during ambulation and no increased work of breathing. Patient with improvement in functional mobility compared to OT evaluation yesterday. Patient would continue to benefit from skilled PT to progress activity tolerance and improve functional mobility. At  hospital discharge recommend home with family assist and HHPT. No DME needs at this time.     Time Calculation:    PT Charges     Row Name 09/02/22 0916             Time Calculation    Start Time 0830  -ML      PT Received On 09/02/22  -ML      PT Goal Re-Cert Due Date 09/12/22  -ML              Timed Charges    47551 - PT Therapeutic Activity Minutes 10  -ML              Untimed Charges    PT Eval/Re-eval Minutes 46  -ML              Total Minutes    Timed Charges Total Minutes 10  -ML      Untimed Charges Total Minutes 46  -ML       Total Minutes 56  -ML            User Key  (r) = Recorded By, (t) = Taken By, (c) = Cosigned By    Initials Name Provider Type    Sabine Zambrano Physical Therapist              Therapy Charges for Today     Code Description Service Date Service Provider Modifiers Qty    70446207475 HC PT THERAPEUTIC ACT EA 15 MIN 9/2/2022 Sabine Vazquez GP 1    53857547855 HC PT EVAL MOD COMPLEXITY 4 9/2/2022 Sabine Vazquez GP 1          PT G-Codes  Outcome Measure Options: AM-PAC 6 Clicks Basic Mobility (PT)  AM-PAC 6 Clicks Score (PT): 22  AM-PAC 6 Clicks Score (OT): 20    Sabine Vazquez  9/2/2022

## 2022-09-16 NOTE — DISCHARGE SUMMARY
[facility] Medicine Services  DISCHARGE SUMMARY    Patient Name: Keyon Olivas  : 1945  MRN: [id number]    Date of Admission: 2022 10:18 AM  Date of Discharge:  22  Primary Care Physician: Osman Olivas MD    Consults     Date and Time Order Name Status Description    2022  2:49 PM Inpatient Cardiology Consult Completed           Hospital Course     Presenting Problem:   CAD (coronary artery disease) [I25.10]  Ulcerative colitis (HCC) [K51.90]    Active Hospital Problems    Diagnosis  POA   • Atrial fibrillation (HCC) [I48.91]  Unknown   • Coronary artery disease involving native coronary artery of native heart with angina pectoris (HCC) [I25.119]  Yes   • Essential hypertension [I10]  Yes   • Hyperlipidemia LDL goal <70 [E78.5]  Yes   • Ulcerative colitis (HCC) [K51.90]  Yes      Resolved Hospital Problems    Diagnosis Date Resolved POA   • **Weakness [R53.1] 2022 Yes   • ANNA MARIE (acute kidney injury) (HCC) [N17.9] 2022 Yes          Hospital Course:  Keyon Olivas is a 76 y.o. male with multivessel CAD s/p remote CABG now with blockages not amenable to intervention, ulcerative colitis s/p ileostomy 40 years ago.  He has had 2 days of increased stoma output with weakness and dyspnea.  Upon arrival to the floor, patient did have a brief episode of atrial fibrillation with rapid ventricular response.         ANNA MARIE and mild metabolic acidosis   High ostomy output of unknown cause  -Suspect hypovolemic due to high output from ostomy  - GI PCR unremarkable.  - no abd imaging done but exam unremarkable.  Consider imaging if fails to resolve   -  imodium  BID       Hematuria  -resolved           Multivessel CAD  PAfib, recurrent episodes ongoing   -Seen by cardiology, no recs   -Echocardiogram with normal LVEF and mild LVH  -Continue ASA, IMDUR, amlodipine, metoprolol       Atrial fibrillation with RVR  - increase metoprolol to 25 BID      Discharge Follow Up  Recommendations for outpatient labs/diagnostics:   PCP    Day of Discharge     HPI:   Pt states ouput from ostomy has improved. Hematuria has resolved. No pain     Review of Systems  Gen- No fevers, chills  CV- No chest pain, palpitations  Resp- No cough, dyspnea  GI- No N/V/D, abd pain        Vital Signs:          Physical Exam:  Constitutional: No acute distress, awake, alert  HENT: NCAT, mucous membranes moist  Respiratory: , respiratory effort normal   Cardiovascular: RRR, no murmurs, rubs, or gallops  Gastrointestinal:  nondistended  Musculoskeletal: No bilateral ankle edema  Psychiatric: Appropriate affect, cooperative  Neurologic: Oriented x 3,  speech clear  Skin: No rashes      Pertinent  and/or Most Recent Results     LAB RESULTS:                              Brief Urine Lab Results  (Last result in the past 365 days)      Color   Clarity   Blood   Leuk Est   Nitrite   Protein   CREAT   Urine HCG        08/31/22 2120 Yellow   Clear   Large (3+)   Negative   Negative   Trace               Microbiology Results (last 10 days)     ** No results found for the last 240 hours. **          No radiology results for the last 10 days    Results for orders placed during the hospital encounter of 08/04/22    Duplex Carotid Ultrasound CAR    Interpretation Summary  1.  Both common carotid arteries are widely patent.  Mild eccentric calcific plaque is noted in the distal left common carotid artery.    2.  Mild bifurcation disease on the right, mild to moderate bifurcation disease on the left with less than 50% stenosis by echo and Doppler bilaterally.    3.  Less than or equal to 15% stenosis by Doppler in the right internal carotid artery with mild atheroma identified.  16 to 49% stenosis by Doppler in the distal left internal carotid artery or mild tortuosity is present.  No obstructive atheroma is identified in either internal carotid artery.    4.  Antegrade flow in both vertebral arteries.    Summary: Nonobstructive  carotid disease bilaterally as detailed above.  Antegrade flow in both vertebral arteries.      Results for orders placed during the hospital encounter of 08/04/22    Duplex Carotid Ultrasound CAR    Interpretation Summary  1.  Both common carotid arteries are widely patent.  Mild eccentric calcific plaque is noted in the distal left common carotid artery.    2.  Mild bifurcation disease on the right, mild to moderate bifurcation disease on the left with less than 50% stenosis by echo and Doppler bilaterally.    3.  Less than or equal to 15% stenosis by Doppler in the right internal carotid artery with mild atheroma identified.  16 to 49% stenosis by Doppler in the distal left internal carotid artery or mild tortuosity is present.  No obstructive atheroma is identified in either internal carotid artery.    4.  Antegrade flow in both vertebral arteries.    Summary: Nonobstructive carotid disease bilaterally as detailed above.  Antegrade flow in both vertebral arteries.      Results for orders placed during the hospital encounter of 08/31/22    Adult Transthoracic Echo Complete W/ Cont if Necessary Per Protocol    Interpretation Summary  · Left ventricular systolic function is normal. Estimated left ventricular EF = 50%.  · Left ventricular wall thickness is consistent with mild concentric hypertrophy.  · The aortic valve is calcified with trace to mild aortic insufficiency no significant stenosis.  · Estimated right ventricular systolic pressure from tricuspid regurgitation is normal (<35 mmHg).      Plan for Follow-up of Pending Labs/Results:     Discharge Details        Discharge Medications      New Medications      Instructions Start Date   loperamide 2 MG capsule  Commonly known as: IMODIUM   2 mg, Oral, 2 Times Daily         Continue These Medications      Instructions Start Date   ALPRAZolam 1 MG tablet  Commonly known as: XANAX   1 mg, Oral, Nightly      amLODIPine 5 MG tablet  Commonly known as: NORVASC   5  mg, Oral, Daily      aspirin 325 MG tablet   325 mg, Oral, Daily      clopidogrel 75 MG tablet  Commonly known as: PLAVIX   75 mg, Oral, Daily      ezetimibe 10 MG tablet  Commonly known as: ZETIA   10 mg, Oral, Daily      isosorbide mononitrate 30 MG 24 hr tablet  Commonly known as: IMDUR   15 mg, Oral, Every Evening      lisinopril 5 MG tablet  Commonly known as: PRINIVIL,ZESTRIL   5 mg, Oral, Daily      metoprolol tartrate 25 MG tablet  Commonly known as: LOPRESSOR   12.5 mg, Oral, 2 Times Daily      nitroglycerin 0.4 MG SL tablet  Commonly known as: NITROSTAT   0.4 mg, Sublingual, Every 5 Minutes PRN, Take no more than 3 doses in 15 minutes.       zolpidem 10 MG tablet  Commonly known as: AMBIEN   10 mg, Oral, Nightly PRN             Allergies   Allergen Reactions   • Crestor [Rosuvastatin] Myalgia   • Lescol [Fluvastatin Sodium] Myalgia   • Lipitor [Atorvastatin] Myalgia   • Niacin And Related Rash   • Penicillins Unknown - Low Severity         Discharge Disposition:  Home or Self Care    Diet:  Hospital:No active diet order      Activity:  Activity Instructions     Activity as Tolerated            Restrictions or Other Recommendations:         CODE STATUS:    Code Status and Medical Interventions:   Ordered at: 08/31/22 1645     Level Of Support Discussed With:    Patient     Code Status (Patient has no pulse and is not breathing):    CPR (Attempt to Resuscitate)     Medical Interventions (Patient has pulse or is breathing):    Full Support       Future Appointments   Date Time Provider Department Center   10/5/2022  1:00 PM Christianne Shah APRN [facility]       Additional Instructions for the Follow-ups that You Need to Schedule     Discharge Follow-up with PCP   As directed       Currently Documented PCP:    Osman Olivas MD    PCP Phone Number:    [phone]     Follow Up Details: 1-2 weeks                     Stephenie Interiano DO  09/16/22      Time Spent on Discharge:  I spent   15  minutes on this discharge activity which included: face-to-face encounter with the patient, reviewing the data in the system, coordination of the care with the nursing staff as well as consultants, documentation, and entering orders.

## 2022-10-05 ENCOUNTER — OFFICE VISIT (OUTPATIENT)
Dept: CARDIOLOGY | Facility: CLINIC | Age: 77
End: 2022-10-05

## 2022-10-05 VITALS
WEIGHT: 160 LBS | OXYGEN SATURATION: 99 % | DIASTOLIC BLOOD PRESSURE: 66 MMHG | BODY MASS INDEX: 25.71 KG/M2 | TEMPERATURE: 97.5 F | HEART RATE: 80 BPM | SYSTOLIC BLOOD PRESSURE: 123 MMHG | HEIGHT: 66 IN

## 2022-10-05 DIAGNOSIS — I48.0 PAROXYSMAL ATRIAL FIBRILLATION: ICD-10-CM

## 2022-10-05 DIAGNOSIS — I25.119 CORONARY ARTERY DISEASE INVOLVING NATIVE CORONARY ARTERY OF NATIVE HEART WITH ANGINA PECTORIS: Primary | ICD-10-CM

## 2022-10-05 DIAGNOSIS — I10 ESSENTIAL HYPERTENSION: ICD-10-CM

## 2022-10-05 DIAGNOSIS — E78.5 HYPERLIPIDEMIA LDL GOAL <70: ICD-10-CM

## 2022-10-05 DIAGNOSIS — I65.23 BILATERAL CAROTID ARTERY STENOSIS: ICD-10-CM

## 2022-10-05 DIAGNOSIS — G47.33 OSA (OBSTRUCTIVE SLEEP APNEA): ICD-10-CM

## 2022-10-05 PROCEDURE — 99214 OFFICE O/P EST MOD 30 MIN: CPT | Performed by: NURSE PRACTITIONER

## 2022-10-05 RX ORDER — CLOPIDOGREL BISULFATE 75 MG/1
75 TABLET ORAL DAILY
Qty: 90 TABLET | Refills: 3 | Status: SHIPPED | OUTPATIENT
Start: 2022-10-05 | End: 2022-10-05 | Stop reason: SDUPTHER

## 2022-10-05 RX ORDER — RANOLAZINE 500 MG/1
500 TABLET, EXTENDED RELEASE ORAL 2 TIMES DAILY
Qty: 180 TABLET | Refills: 3 | Status: SHIPPED | OUTPATIENT
Start: 2022-10-05 | End: 2022-10-25 | Stop reason: SDUPTHER

## 2022-10-05 RX ORDER — CLOPIDOGREL BISULFATE 75 MG/1
75 TABLET ORAL DAILY
Qty: 90 TABLET | Refills: 3 | Status: SHIPPED | OUTPATIENT
Start: 2022-10-05 | End: 2023-02-07 | Stop reason: ALTCHOICE

## 2022-10-05 NOTE — PROGRESS NOTES
Subjective   Keyon Olivas is a 77 y.o. male     Chief Complaint   Patient presents with   • Follow-up       HPI    PROBLEM LIST:     1. Coronary artery disease   1.1 CABG x 3, 05/11/2000;  LIMA to LAD, Left radial artery to RCA. Left radial artery as T graft to LIMA/OM2  1.2 cath august 2006, stenting to RCA and OM2  1.3 Stress test 7/5/2022-moderate to large size dense, but only partially reversible defect involving the inferior and inferolateral wall compatible with ischemia, post-rest EF 59%  1.4 LHC 8/17/2022-left main 50% tapering, circumflex which was less than 1 mm had high-grade stenosis, second large multiply branching obtuse marginal with segmental 80 to 90% stenosis proximally in 7090% stenosis beyond its major bifurcation, ongoing circumflex provide a second obtuse marginal which appeared to be the least moderate size which was totally occluded proximally, it was filled with ipsilateral collaterals, LAD totally occluded, right coronary artery totally occluded, artery graft to the right coronary artery was tiny but patent, attached on the near the acute margin and ongoing recording artery was severely diffusely diseased with only 1 mm or less in diameter, the internal mammary artery bypass to the middle third LAD was widely patent, there is retrograde fill to the area of a significant septal  and antegrade fill demonstrated sequential high-grade stenosis with a vessel of less than 2 mm in diameter, EF 55%, after PTCA there is 50% or less stenosis in both treated segments with no evidence of thrombus, dissection, branch loss or distal embolization.  I would recommend the patient then be evaluated for repeat percutaneous intervention in the first obtuse marginal and in the chronically totally occluded second obtuse marginal as these are the only size for potential mechanical revascularization given the patient's low level symptoms and decreased LV systolic performance.  2. Hypertension   3.  Dyslipidemia   3.1 intolerant to Crestor 2012, Lipitor, simvastatin, Lescol, niacin and Zetia  4. H/o hep. C; followed by Dr. Cash   5. Dyspnea on Exertion   5.1 Echo 2/27/2020-EF 50%, mild LVH, diastolic dysfunction 1, moderate to severe left atrial large amount, mild to moderate MR, mild TR, PA in the 30s  5.2 Echo 7/5/2022-EF 55 to 60%, diastolic dysfunction 2, trivial to mild MR, mild TR, PA in the low 30s  5.3 Echo 8/31/2022-EF 50%, trace to mild aortic insufficiency, mild LVH, PA less than 35  6.  DEACON, CPAP  7. Event Monitor 6/22 - 7/5/2022 - NSR, PAC, Sinus Arrhythmia   8.  Carotid artery disease  8.1 carotid artery ultrasound 8/4/2022-less than 50% stenosis bilaterally at bifurcations, less than or go to 15% stenosis or internal carotid artery, 16-49% stenosis distal left internal carotid artery, mild tortuosity, antegrade flow both vertebral arteries  9.  Event monitor 6/22 through 7/5/2022-normal sinus rhythm, sinus arrhythmia, sinus tach, sinus bradycardia, PACs    Patient is a 77-year-old male who presents today for a follow-up.  He denies any chest pain, pressure, palpitations, fluttering, presyncope, syncope, o orthopnea, PND or edema.  He says he has dizziness just once in a while when he stands up really quickly.  He says it makes him little off balance.  He says he does have some shortness of breath with activity but this has not changed.  He says since his colitis was taking care of he feels much better.  He was unable to do the Imdur because long-acting medications do not do well with his ostomy.  He said when he had colitis it hit him really quick and he did not even know what it was.    We went over [facility] records including echocardiogram.  Patient is considering going to [facility] or maybe go heart [facility] just to get a second opinion and see if they can do anything with his blockage.  Dr. Goff did advise him that there is nothing that he felt he could do.    Current Outpatient  Medications on File Prior to Visit   Medication Sig Dispense Refill   • ALPRAZolam (XANAX) 1 MG tablet Take 1 mg by mouth Every Night.     • amLODIPine (NORVASC) 5 MG tablet Take 1 tablet by mouth Daily. 30 tablet 3   • aspirin 325 MG tablet Take 325 mg by mouth Daily.     • ezetimibe (ZETIA) 10 MG tablet Take 1 tablet by mouth Daily. 60 tablet 0   • lisinopril (PRINIVIL,ZESTRIL) 5 MG tablet Take 1 tablet by mouth Daily. 30 tablet 11   • loperamide (IMODIUM) 2 MG capsule Take 1 capsule by mouth 2 (Two) Times a Day. 30 capsule    • metoprolol tartrate (LOPRESSOR) 25 MG tablet Take 0.5 tablets by mouth 2 (Two) Times a Day. 60 tablet 11   • nitroglycerin (NITROSTAT) 0.4 MG SL tablet Place 1 tablet under the tongue Every 5 (Five) Minutes As Needed for Chest Pain. Take no more than 3 doses in 15 minutes. 35 tablet 5   • zolpidem (AMBIEN) 10 MG tablet Take 10 mg by mouth At Night As Needed for Sleep.     • [DISCONTINUED] clopidogrel (PLAVIX) 75 MG tablet Take 75 mg by mouth Daily.     • [DISCONTINUED] isosorbide mononitrate (IMDUR) 30 MG 24 hr tablet Take 0.5 tablets by mouth Every Evening. 30 tablet 11     No current facility-administered medications on file prior to visit.       ALLERGIES    Crestor [rosuvastatin], Lescol [fluvastatin sodium], Lipitor [atorvastatin], Niacin and related, and Penicillins    Past Medical History:   Diagnosis Date   • ANNA MARIE (acute kidney injury) (HCC) 8/31/2022   • Anxiety and depression 03/09/2017   • Atrial fibrillation (HCC) 8/31/2022   • Bilateral carotid artery stenosis 8/23/2022   • BPH (benign prostatic hyperplasia) 03/09/2017   • CAD (coronary artery disease) 03/09/2017   • COVID-19     02/2022   • COVID-19 vaccine administered     02/26/2021, 03/26/2021, 03/10/2022 - Moderna   • DJD (degenerative joint disease) 03/09/2017   • Dyslipidemia 03/09/2017   • Hepatitis C     History of hepatitis C; followed by Dr. Cash in [place].   • Hyperlipidemia    • Hypertension 03/09/2017   •  Insomnia 2017   • Neuropathy    • DEACON (obstructive sleep apnea) 2017   • Poor short term memory 2017   • Small fiber neuropathy    • Ulcerative colitis (HCC) 2017       Social History     Socioeconomic History   • Marital status:    Tobacco Use   • Smoking status: Former Smoker     Packs/day: 1.00     Years: 15.00     Pack years: 15.00     Types: Cigarettes     Start date: 1965     Quit date: 1983     Years since quittin.7   • Smokeless tobacco: Former User     Types: Chew   • Tobacco comment: smoked 10-20 years, < last 1 PPD   Vaping Use   • Vaping Use: Never used   Substance and Sexual Activity   • Alcohol use: No   • Drug use: No   • Sexual activity: Yes     Partners: Female     Birth control/protection: None       Family History   Problem Relation Age of Onset   • Emphysema Mother    • Heart failure Mother    • Heart attack Father    • Memory loss Brother        Review of Systems   Constitutional: Negative for appetite change, chills, fatigue and fever.   HENT: Negative for congestion, rhinorrhea and sore throat.    Eyes: Positive for visual disturbance (reading glasses ).   Respiratory: Positive for shortness of breath (with activity ). Negative for cough, chest tightness and wheezing.    Cardiovascular: Negative for chest pain, palpitations and leg swelling.   Gastrointestinal: Positive for diarrhea (diarrhea at times ). Negative for abdominal pain, blood in stool, constipation, nausea and vomiting.   Endocrine: Negative for cold intolerance and heat intolerance.   Genitourinary: Negative for difficulty urinating, dysuria, frequency, hematuria and urgency.   Musculoskeletal: Positive for back pain (lower back ). Negative for arthralgias, joint swelling, neck pain and neck stiffness.   Skin: Negative for color change, pallor, rash and wound.   Allergic/Immunologic: Negative for environmental allergies and food allergies.   Neurological: Positive for dizziness  "(stranding up quick , at night when he gets up to use the bathroom he has dizziness that  makes his balance off ). Negative for syncope, weakness, light-headedness, numbness and headaches.   Hematological: Does not bruise/bleed easily.   Psychiatric/Behavioral: Positive for sleep disturbance ( meds to help him sleep ).       Objective   /66 (BP Location: Left arm, Patient Position: Sitting)   Pulse 80   Temp 97.5 °F (36.4 °C)   Ht 167.6 cm (66\")   Wt 72.6 kg (160 lb)   SpO2 99%   BMI 25.82 kg/m²   Vitals:    10/05/22 1312   BP: 123/66   BP Location: Left arm   Patient Position: Sitting   Pulse: 80   Temp: 97.5 °F (36.4 °C)   SpO2: 99%   Weight: 72.6 kg (160 lb)   Height: 167.6 cm (66\")      Lab Results (most recent)     None        Physical Exam  Vitals reviewed.   Constitutional:       General: He is awake.      Appearance: Normal appearance. He is well-developed, well-groomed and normal weight.   HENT:      Head: Normocephalic.   Eyes:      General: Lids are normal.      Comments: Wears glasses    Neck:      Vascular: No carotid bruit, hepatojugular reflux or JVD.   Cardiovascular:      Rate and Rhythm: Normal rate and regular rhythm.      Pulses:           Radial pulses are 2+ on the right side and 2+ on the left side.        Dorsalis pedis pulses are 2+ on the right side and 2+ on the left side.        Posterior tibial pulses are 2+ on the right side and 2+ on the left side.      Heart sounds: Normal heart sounds.   Pulmonary:      Effort: Pulmonary effort is normal.      Breath sounds: Normal breath sounds and air entry.   Abdominal:      General: Bowel sounds are normal.      Palpations: Abdomen is soft.      Comments: Right abdominal colostomy    Musculoskeletal:      Right lower leg: No edema.      Left lower leg: No edema.   Skin:     General: Skin is warm and dry.   Neurological:      Mental Status: He is alert and oriented to person, place, and time.   Psychiatric:         Attention and " Perception: Attention and perception normal.         Mood and Affect: Mood and affect normal.         Speech: Speech normal.         Behavior: Behavior normal. Behavior is cooperative.         Thought Content: Thought content normal.         Cognition and Memory: Cognition and memory normal.         Judgment: Judgment normal.         Procedure   Procedures         Assessment & Plan      Diagnosis Plan   1. Coronary artery disease involving native coronary artery of native heart with angina pectoris (HCC)  ranolazine (Ranexa) 500 MG 12 hr tablet   2. Essential hypertension     3. Hyperlipidemia LDL goal <70     4. Bilateral carotid artery stenosis     5. Paroxysmal atrial fibrillation (HCC)     6. DEACON (obstructive sleep apnea)         Return in about 4 months (around 2/5/2023).    CAD-patient's on aspirin, Plavix, beta and Zetia.  Since he was intolerant to the Imdur we are going to do Ranexa 500 twice a day.  Hypertension-patient's on amlodipine, lisinopril and metoprolol and doing well.  Hyperlipidemia-patient's on Zetia.  Carotid artery disease-patient's on aspirin and Zetia.  A. fib-patient had some episodes when he was in the hospital however they did not put patient on anticoagulation.  He is on aspirin and Plavix.  He is also on beta-blocker.  DEACON-patient uses CPAP.  He will continue his medication regimen above-noted change.  He will follow-up in 4 months or sooner if any changes.       Keyon Olivas  reports that he quit smoking about 39 years ago. His smoking use included cigarettes. He started smoking about 57 years ago. He has a 15.00 pack-year smoking history. He has quit using smokeless tobacco.  His smokeless tobacco use included chew..Advance Care Planning   ACP discussion was declined by the patient. Patient has an advance directive (not in EMR), copy requested. Patient brought in medicine list to appointment, it's been reviewed with patient and med list was updated in the chart.         Electronically signed by:

## 2022-10-20 DIAGNOSIS — E78.5 DYSLIPIDEMIA: ICD-10-CM

## 2022-10-20 DIAGNOSIS — I25.810 CORONARY ARTERY DISEASE INVOLVING CORONARY BYPASS GRAFT OF NATIVE HEART WITHOUT ANGINA PECTORIS: ICD-10-CM

## 2022-10-20 RX ORDER — EZETIMIBE 10 MG/1
10 TABLET ORAL DAILY
Qty: 30 TABLET | Refills: 11 | Status: SHIPPED | OUTPATIENT
Start: 2022-10-20

## 2022-10-20 RX ORDER — EZETIMIBE 10 MG/1
TABLET ORAL
Qty: 60 TABLET | Refills: 0 | Status: SHIPPED | OUTPATIENT
Start: 2022-10-20 | End: 2022-10-20

## 2022-10-25 ENCOUNTER — TELEPHONE (OUTPATIENT)
Dept: CARDIOLOGY | Facility: CLINIC | Age: 77
End: 2022-10-25

## 2022-10-25 DIAGNOSIS — I25.119 CORONARY ARTERY DISEASE INVOLVING NATIVE CORONARY ARTERY OF NATIVE HEART WITH ANGINA PECTORIS: ICD-10-CM

## 2022-10-25 RX ORDER — RANOLAZINE 500 MG/1
500 TABLET, EXTENDED RELEASE ORAL 2 TIMES DAILY
Qty: 180 TABLET | Refills: 3 | Status: SHIPPED | OUTPATIENT
Start: 2022-10-25 | End: 2022-10-25 | Stop reason: ALTCHOICE

## 2022-10-25 RX ORDER — ISOSORBIDE DINITRATE 20 MG/1
20 TABLET ORAL 2 TIMES DAILY
Qty: 180 TABLET | Refills: 3 | Status: SHIPPED | OUTPATIENT
Start: 2022-10-25 | End: 2023-02-07

## 2022-10-25 NOTE — TELEPHONE ENCOUNTER
"Called pt back, I advised him that Christianne stopped Isosorbide and sent in Ranexa. Pt states that he has never taken the Ranexa that he's aware of. Stated that he has too many medications to keep up with. I asked him if he's been having CP, he stated he has not been. I asked about his BP, he stated \"it's doing fine.\" Stated that it's been around 128/96 HR 73.     Pt stated that he would go to [facility] and P/U Ranexa. He is aware to call us if he has any issues w/ that medication.   "

## 2022-10-25 NOTE — TELEPHONE ENCOUNTER
Pt LVM on triage stating that the Ranexa he was prescribes is extended release and that he cannot take it. Stated he wants regular release Isosorbide.     Per chart review, Ranexa is q12hr, so it is not extended release. Called pt's px to confirm, I was placed on hold, line rang continuously for over 8min w/ no answer.       I called Christianne, to see if she wants to have pt try the Ranexa or switch to the short acting nitro, no answer.

## 2022-10-25 NOTE — TELEPHONE ENCOUNTER
Christianne Shah, Kelli Pressley  Caller: Unspecified (Today,  9:54 AM)  Isosorbide Dinitrate 20 mg PO BID D/C the Ranexa

## 2022-10-25 NOTE — TELEPHONE ENCOUNTER
Christianne Shah APRN Lanum, Emily  Caller: Unspecified (Today,  9:34 AM)  Per my last office note we put him on Ranexa instead.  Can you call patient and see if he is having chest pain?  If not and doing ok with Ranexa may not need the imdur.  Also how is BP running as the imdur will lower blood pressure.  There is short acting that we can use, but I don't want to give him additional medication if not needed/warranted.

## 2022-10-25 NOTE — TELEPHONE ENCOUNTER
Updated med list and sent in Isosorbide Dinitrate 20mg BID, per Christianne.       I called and made pt aware of the info from Christianne, he verbalized understanding.

## 2022-10-25 NOTE — TELEPHONE ENCOUNTER
"Pt LVM on triage regd his Isosorbide. He stated that Christianne prescribed it to him at some point and that it's extended release and that he can't take extended release medications because he has no colon. Stated that he would like regular release sent in to [facility].   Pt's best callback # [phone]      Per chart review, Isosorbide is on pt's inactive rx list. Notes state: patient discharge and that was done per LISSET Oconnor. Previously, Isosorbide was D/C'd by Christianne and it stated \"therapy completed.\"               "

## 2022-11-07 ENCOUNTER — DOCUMENTATION (OUTPATIENT)
Dept: TELEMETRY | Facility: HOSPITAL | Age: 77
End: 2022-11-07

## 2022-12-12 RX ORDER — LISINOPRIL 10 MG/1
TABLET ORAL
Qty: 30 TABLET | Refills: 11 | OUTPATIENT
Start: 2022-12-12

## 2023-02-07 ENCOUNTER — OFFICE VISIT (OUTPATIENT)
Dept: CARDIOLOGY | Facility: CLINIC | Age: 78
End: 2023-02-07
Payer: MEDICARE

## 2023-02-07 ENCOUNTER — LAB (OUTPATIENT)
Dept: CARDIOLOGY | Facility: CLINIC | Age: 78
End: 2023-02-07
Payer: MEDICARE

## 2023-02-07 VITALS
BODY MASS INDEX: 26.7 KG/M2 | OXYGEN SATURATION: 96 % | SYSTOLIC BLOOD PRESSURE: 121 MMHG | HEART RATE: 83 BPM | WEIGHT: 166.14 LBS | HEIGHT: 66 IN | DIASTOLIC BLOOD PRESSURE: 64 MMHG

## 2023-02-07 DIAGNOSIS — I48.0 PAROXYSMAL ATRIAL FIBRILLATION: ICD-10-CM

## 2023-02-07 DIAGNOSIS — R06.02 SHORTNESS OF BREATH: ICD-10-CM

## 2023-02-07 DIAGNOSIS — E78.5 HYPERLIPIDEMIA LDL GOAL <70: ICD-10-CM

## 2023-02-07 DIAGNOSIS — I10 ESSENTIAL HYPERTENSION: ICD-10-CM

## 2023-02-07 DIAGNOSIS — G47.33 OSA (OBSTRUCTIVE SLEEP APNEA): ICD-10-CM

## 2023-02-07 DIAGNOSIS — I25.119 CORONARY ARTERY DISEASE INVOLVING NATIVE CORONARY ARTERY OF NATIVE HEART WITH ANGINA PECTORIS: Primary | ICD-10-CM

## 2023-02-07 DIAGNOSIS — I65.23 BILATERAL CAROTID ARTERY STENOSIS: ICD-10-CM

## 2023-02-07 LAB
ANION GAP SERPL CALCULATED.3IONS-SCNC: 9.7 MMOL/L (ref 5–15)
BUN SERPL-MCNC: 18 MG/DL (ref 8–23)
BUN/CREAT SERPL: 15.7 (ref 7–25)
CALCIUM SPEC-SCNC: 9.3 MG/DL (ref 8.6–10.5)
CHLORIDE SERPL-SCNC: 106 MMOL/L (ref 98–107)
CO2 SERPL-SCNC: 22.3 MMOL/L (ref 22–29)
CREAT SERPL-MCNC: 1.15 MG/DL (ref 0.76–1.27)
DEPRECATED RDW RBC AUTO: 46.6 FL (ref 37–54)
EGFRCR SERPLBLD CKD-EPI 2021: 66.4 ML/MIN/1.73
ERYTHROCYTE [DISTWIDTH] IN BLOOD BY AUTOMATED COUNT: 14.2 % (ref 12.3–15.4)
GLUCOSE SERPL-MCNC: 88 MG/DL (ref 65–99)
HCT VFR BLD AUTO: 35.6 % (ref 37.5–51)
HGB BLD-MCNC: 11.2 G/DL (ref 13–17.7)
MCH RBC QN AUTO: 28.3 PG (ref 26.6–33)
MCHC RBC AUTO-ENTMCNC: 31.5 G/DL (ref 31.5–35.7)
MCV RBC AUTO: 89.9 FL (ref 79–97)
PLATELET # BLD AUTO: 209 10*3/MM3 (ref 140–450)
PMV BLD AUTO: 10.7 FL (ref 6–12)
POTASSIUM SERPL-SCNC: 4.9 MMOL/L (ref 3.5–5.2)
RBC # BLD AUTO: 3.96 10*6/MM3 (ref 4.14–5.8)
SODIUM SERPL-SCNC: 138 MMOL/L (ref 136–145)
WBC NRBC COR # BLD: 7.21 10*3/MM3 (ref 3.4–10.8)

## 2023-02-07 PROCEDURE — 99214 OFFICE O/P EST MOD 30 MIN: CPT | Performed by: NURSE PRACTITIONER

## 2023-02-07 PROCEDURE — 85027 COMPLETE CBC AUTOMATED: CPT | Performed by: NURSE PRACTITIONER

## 2023-02-07 PROCEDURE — 80048 BASIC METABOLIC PNL TOTAL CA: CPT | Performed by: NURSE PRACTITIONER

## 2023-02-07 RX ORDER — ASPIRIN 81 MG/1
81 TABLET ORAL DAILY
COMMUNITY

## 2023-02-07 RX ORDER — VENLAFAXINE 75 MG/1
TABLET ORAL
COMMUNITY
Start: 2023-01-12

## 2023-02-07 RX ORDER — TAMSULOSIN HYDROCHLORIDE 0.4 MG/1
CAPSULE ORAL
COMMUNITY
Start: 2023-01-16

## 2023-02-07 NOTE — PROGRESS NOTES
Subjective   Keyon Olivas is a 77 y.o. male     Chief Complaint   Patient presents with   • Follow-up       4 mth f/u        [facility]    PROBLEM LIST:     1. Coronary artery disease   1.1 CABG x 3, 05/11/2000;  LIMA to LAD, Left radial artery to RCA. Left radial artery as T graft to LIMA/OM2  1.2 cath august 2006, stenting to RCA and OM2  1.3 Stress test 7/5/2022-moderate to large size dense, but only partially reversible defect involving the inferior and inferolateral wall compatible with ischemia, post-rest EF 59%  1.4 C 8/17/2022-left main 50% tapering, circumflex which was less than 1 mm had high-grade stenosis, second large multiply branching obtuse marginal with segmental 80 to 90% stenosis proximally in 7090% stenosis beyond its major bifurcation, ongoing circumflex provide a second obtuse marginal which appeared to be the least moderate size which was totally occluded proximally, it was filled with ipsilateral collaterals, LAD totally occluded, right coronary artery totally occluded, artery graft to the right coronary artery was tiny but patent, attached on the near the acute margin and ongoing recording artery was severely diffusely diseased with only 1 mm or less in diameter, the internal mammary artery bypass to the middle third LAD was widely patent, there is retrograde fill to the area of a significant septal  and antegrade fill demonstrated sequential high-grade stenosis with a vessel of less than 2 mm in diameter, EF 55%, after PTCA there is 50% or less stenosis in both treated segments with no evidence of thrombus, dissection, branch loss or distal embolization.  I would recommend the patient then be evaluated for repeat percutaneous intervention in the first obtuse marginal and in the chronically totally occluded second obtuse marginal as these are the only size for potential mechanical revascularization given the patient's low level symptoms and decreased LV systolic performance.  2.  Hypertension   3. Dyslipidemia   3.1 intolerant to Crestor 2012, Lipitor, simvastatin, Lescol, niacin and Zetia  4. H/o hep. C; followed by Dr. Cash   5. Dyspnea on Exertion   5.1 Echo 2/27/2020-EF 50%, mild LVH, diastolic dysfunction 1, moderate to severe left atrial large amount, mild to moderate MR, mild TR, PA in the 30s  5.2 Echo 7/5/2022-EF 55 to 60%, diastolic dysfunction 2, trivial to mild MR, mild TR, PA in the low 30s  5.3 Echo 8/31/2022-EF 50%, trace to mild aortic insufficiency, mild LVH, PA less than 35  6.  DEACON, CPAP  7. Event Monitor 6/22 - 7/5/2022 - NSR, PAC, Sinus Arrhythmia   8.  Carotid artery disease  8.1 carotid artery ultrasound 8/4/2022-less than 50% stenosis bilaterally at bifurcations, less than or go to 15% stenosis or internal carotid artery, 16-49% stenosis distal left internal carotid artery, mild tortuosity, antegrade flow both vertebral arteries  9.  Event monitor 6/22 through 7/5/2022-normal sinus rhythm, sinus arrhythmia, sinus tach, sinus bradycardia, PACs  10. ULCERATIVE COLITIS, ILEOSTOMY RLQ 1971    Patient is a 77-year-old male who presents today for a follow-up.  Patient was recently at  on 3 separate occasions due to sepsis due to loss of volume in his ileostomy.  Patient denies any chest pain, pressure, palpitations, fluttering, presyncope, syncope, orthopnea, PND or edema.  He does have dizziness with position change but he has orthostatic hypotension.  Patient says that his biggest issue is fatigue and shortness of breath.  He says it started about 2 years ago and it got worse after being in the hospital with sepsis.  He says he is very concerned about this.  He would like to have a repeat heart cath due to his known coronary artery disease.  He would like to consider possibly going to [facility] but he wants to have a repeat cath first to see if there is anything that can be fixed locally.    We did go over the fact that his LDL was too high at 106, triglycerides  were good at 109 and A1c was 4.6.  While patient was at  they discontinued his Plavix, Imdur as well as decrease his aspirin back to 81.  Current Outpatient Medications on File Prior to Visit   Medication Sig Dispense Refill   • aspirin 81 MG EC tablet Take 81 mg by mouth Daily.     • ezetimibe (ZETIA) 10 MG tablet Take 1 tablet by mouth Daily. 30 tablet 11   • loperamide (IMODIUM) 2 MG capsule Take 1 capsule by mouth 2 (Two) Times a Day. 30 capsule    • metoprolol tartrate (LOPRESSOR) 25 MG tablet Take 0.5 tablets by mouth 2 (Two) Times a Day. 60 tablet 11   • nitroglycerin (NITROSTAT) 0.4 MG SL tablet Place 1 tablet under the tongue Every 5 (Five) Minutes As Needed for Chest Pain. Take no more than 3 doses in 15 minutes. 35 tablet 5   • tamsulosin (FLOMAX) 0.4 MG capsule 24 hr capsule      • venlafaxine (EFFEXOR) 75 MG tablet      • [DISCONTINUED] ALPRAZolam (XANAX) 1 MG tablet Take 1 mg by mouth Every Night.     • [DISCONTINUED] amLODIPine (NORVASC) 5 MG tablet Take 1 tablet by mouth Daily. 30 tablet 3   • [DISCONTINUED] aspirin 325 MG tablet Take 325 mg by mouth Daily.     • [DISCONTINUED] clopidogrel (PLAVIX) 75 MG tablet Take 1 tablet by mouth Daily. 90 tablet 3   • [DISCONTINUED] isosorbide dinitrate (ISORDIL) 20 MG tablet Take 1 tablet by mouth 2 (Two) Times a Day. 180 tablet 3   • [DISCONTINUED] lisinopril (PRINIVIL,ZESTRIL) 5 MG tablet Take 1 tablet by mouth Daily. 30 tablet 11   • [DISCONTINUED] zolpidem (AMBIEN) 10 MG tablet Take 10 mg by mouth At Night As Needed for Sleep.       No current facility-administered medications on file prior to visit.       ALLERGIES    Crestor [rosuvastatin], Lescol [fluvastatin sodium], Lipitor [atorvastatin], Niacin and related, and Penicillins    Past Medical History:   Diagnosis Date   • ANNA MARIE (acute kidney injury) (HCC) 8/31/2022   • Anxiety and depression 03/09/2017   • Atrial fibrillation (HCC) 8/31/2022   • Bilateral carotid artery stenosis 8/23/2022   • BPH (benign  prostatic hyperplasia) 2017   • CAD (coronary artery disease) 2017   • COVID-19     2022   • COVID-19 vaccine administered     2021, 2021, 03/10/2022 - Moderna   • DJD (degenerative joint disease) 2017   • Dyslipidemia 2017   • Hepatitis C     History of hepatitis C; followed by Dr. Cash in [place].   • Hyperlipidemia    • Hypertension 2017   • Insomnia 2017   • Neuropathy    • DEACON (obstructive sleep apnea) 2017   • Poor short term memory 2017   • Small fiber neuropathy    • Ulcerative colitis (HCC) 2017       Social History     Socioeconomic History   • Marital status:    Tobacco Use   • Smoking status: Former     Packs/day: 1.00     Years: 15.00     Pack years: 15.00     Types: Cigarettes     Start date: 1965     Quit date: 1983     Years since quittin.1   • Smokeless tobacco: Former     Types: Chew   • Tobacco comments:     smoked 10-20 years, < last 1 PPD   Vaping Use   • Vaping Use: Never used   Substance and Sexual Activity   • Alcohol use: No   • Drug use: No   • Sexual activity: Yes     Partners: Female     Birth control/protection: None       Family History   Problem Relation Age of Onset   • Emphysema Mother    • Heart failure Mother    • Heart attack Father    • Memory loss Brother        Review of Systems   Constitutional: Positive for fatigue (ALL THE TIME, CAME ON ABOUT 2 YRS AGO ). Negative for appetite change, chills and fever.   HENT: Negative for dental problem, drooling, ear discharge, ear pain, sinus pressure, sneezing, sore throat and tinnitus.    Eyes: Negative for pain, redness, itching and visual disturbance.   Respiratory: Positive for shortness of breath (BIGGEST PROBLEM; STARTED ABOUT 2YRS AGO, WORSE SINCE BEING IN HOSPITAL WITH SEPSIS ). Negative for cough, choking and wheezing.    Cardiovascular: Negative for chest pain, palpitations and leg swelling.   Gastrointestinal: Negative for blood in stool,  "constipation, diarrhea (RESOLVED ), nausea and vomiting.        ILEOSTOMY    Endocrine: Negative.    Genitourinary: Negative for difficulty urinating.   Musculoskeletal: Positive for arthralgias. Negative for back pain and neck pain.   Skin: Positive for rash. Negative for wound.   Allergic/Immunologic: Negative for environmental allergies.   Neurological: Positive for dizziness (WITH POSITION CHANGE ), weakness and numbness (NUMBNESS IN HANDS).   Hematological: Does not bruise/bleed easily.   Psychiatric/Behavioral: Positive for sleep disturbance.       Objective   /64   Pulse 83   Ht 167.6 cm (65.98\")   Wt 75.4 kg (166 lb 2.2 oz)   SpO2 96%   BMI 26.83 kg/m²   Vitals:    02/07/23 1500   BP: 121/64   Pulse: 83   SpO2: 96%   Weight: 75.4 kg (166 lb 2.2 oz)   Height: 167.6 cm (65.98\")      Lab Results (most recent)     None        Physical Exam  Vitals reviewed.   Constitutional:       General: He is awake.      Appearance: Normal appearance. He is well-developed, well-groomed and overweight.   HENT:      Head: Normocephalic.   Eyes:      General: Lids are normal.   Neck:      Vascular: No carotid bruit, hepatojugular reflux or JVD.   Cardiovascular:      Rate and Rhythm: Normal rate and regular rhythm.      Pulses:           Radial pulses are 2+ on the right side.        Dorsalis pedis pulses are 2+ on the right side and 2+ on the left side.        Posterior tibial pulses are 2+ on the right side and 2+ on the left side.      Heart sounds: Normal heart sounds.      Comments: UNABLE TO PALPATE LEFT RADIAL PULSE  Pulmonary:      Effort: Pulmonary effort is normal.      Breath sounds: Normal breath sounds and air entry.   Abdominal:      General: Bowel sounds are normal.      Palpations: Abdomen is soft.   Musculoskeletal:      Right lower leg: No edema.      Left lower leg: No edema.   Skin:     General: Skin is warm and dry.      Comments: LEFT FOREARM SCAR   Neurological:      Mental Status: He is alert " and oriented to person, place, and time.   Psychiatric:         Attention and Perception: Attention and perception normal.         Mood and Affect: Mood and affect normal.         Speech: Speech normal.         Behavior: Behavior normal. Behavior is cooperative.         Thought Content: Thought content normal.         Cognition and Memory: Cognition and memory normal.         Judgment: Judgment normal.         Procedure   Procedures         Assessment & Plan      Diagnosis Plan   1. Coronary artery disease involving native coronary artery of native heart with angina pectoris (HCC)  [facility] Cath      2. Essential hypertension  [facility]    CBC (No Diff)    Basic Metabolic Panel      3. Hyperlipidemia LDL goal <70  [facility] Cath      4. Bilateral carotid artery stenosis        5. Paroxysmal atrial fibrillation (HCC)  [facility] Cath      6. DEACON (obstructive sleep apnea)        7. Shortness of breath  [facility] Cath          Return 2-4 WEEKS AFTER LHC.    CAD/hypertension/hyperlipidemia/A-fib/shortness of breath-patient wishes to proceed with repeat heart cath.  He will get a CBC and BMP.  He has no medication holds.  Patient will continue his medication regimen.  Carotid artery disease-patient is on aspirin and Zetia.  DEACON-patient uses CPAP.  He will follow-up 2 to 4 weeks after heart cath or sooner if any changes.       Keyon Olivas  reports that he quit smoking about 40 years ago. His smoking use included cigarettes. He started smoking about 58 years ago. He has a 15.00 pack-year smoking history. He has quit using smokeless tobacco.  His smokeless tobacco use included chew..    Advance Care Planning   ACP discussion was declined by the patient. Patient does not have an advance directive, declines further assistance.       Electronically signed by:  LISSET Rodriguez

## 2023-02-08 ENCOUNTER — TELEPHONE (OUTPATIENT)
Dept: CARDIOLOGY | Facility: CLINIC | Age: 78
End: 2023-02-08
Payer: MEDICARE

## 2023-02-08 NOTE — TELEPHONE ENCOUNTER
Pt verbalized understanding of labs being within acceptable range for his cath, pt had no further questions.

## 2023-03-08 ENCOUNTER — OUTSIDE FACILITY SERVICE (OUTPATIENT)
Dept: CARDIOLOGY | Facility: CLINIC | Age: 78
End: 2023-03-08
Payer: MEDICARE

## 2023-03-08 DIAGNOSIS — I48.0 PAROXYSMAL ATRIAL FIBRILLATION: ICD-10-CM

## 2023-03-08 DIAGNOSIS — E78.5 HYPERLIPIDEMIA LDL GOAL <70: ICD-10-CM

## 2023-03-08 DIAGNOSIS — I25.119 CORONARY ARTERY DISEASE INVOLVING NATIVE CORONARY ARTERY OF NATIVE HEART WITH ANGINA PECTORIS: ICD-10-CM

## 2023-03-08 DIAGNOSIS — R06.02 SHORTNESS OF BREATH: ICD-10-CM

## 2023-03-08 DIAGNOSIS — I10 ESSENTIAL HYPERTENSION: ICD-10-CM

## 2023-03-08 PROCEDURE — 92979 ENDOLUMINL IVUS OCT C EA: CPT | Performed by: INTERNAL MEDICINE

## 2023-03-08 PROCEDURE — 92978 ENDOLUMINL IVUS OCT C 1ST: CPT | Performed by: INTERNAL MEDICINE

## 2023-03-08 PROCEDURE — 92928 PRQ TCAT PLMT NTRAC ST 1 LES: CPT | Performed by: INTERNAL MEDICINE

## 2023-03-08 PROCEDURE — 93459 L HRT ART/GRFT ANGIO: CPT | Performed by: INTERNAL MEDICINE

## 2023-03-09 RX ORDER — CLOPIDOGREL BISULFATE 75 MG/1
75 TABLET ORAL DAILY
Qty: 90 TABLET | Refills: 3 | Status: SHIPPED | OUTPATIENT
Start: 2023-03-09

## 2023-03-14 ENCOUNTER — OFFICE VISIT (OUTPATIENT)
Dept: CARDIOLOGY | Facility: CLINIC | Age: 78
End: 2023-03-14
Payer: MEDICARE

## 2023-03-14 VITALS
SYSTOLIC BLOOD PRESSURE: 119 MMHG | HEIGHT: 66 IN | OXYGEN SATURATION: 98 % | WEIGHT: 174 LBS | BODY MASS INDEX: 27.97 KG/M2 | DIASTOLIC BLOOD PRESSURE: 56 MMHG | HEART RATE: 75 BPM

## 2023-03-14 DIAGNOSIS — I10 ESSENTIAL HYPERTENSION: ICD-10-CM

## 2023-03-14 DIAGNOSIS — I25.810 CORONARY ARTERY DISEASE INVOLVING CORONARY BYPASS GRAFT OF NATIVE HEART WITHOUT ANGINA PECTORIS: Primary | ICD-10-CM

## 2023-03-14 DIAGNOSIS — R06.02 SHORTNESS OF BREATH: ICD-10-CM

## 2023-03-14 PROCEDURE — 3078F DIAST BP <80 MM HG: CPT | Performed by: PHYSICIAN ASSISTANT

## 2023-03-14 PROCEDURE — 1160F RVW MEDS BY RX/DR IN RCRD: CPT | Performed by: PHYSICIAN ASSISTANT

## 2023-03-14 PROCEDURE — 99214 OFFICE O/P EST MOD 30 MIN: CPT | Performed by: PHYSICIAN ASSISTANT

## 2023-03-14 PROCEDURE — 3074F SYST BP LT 130 MM HG: CPT | Performed by: PHYSICIAN ASSISTANT

## 2023-03-14 PROCEDURE — 1159F MED LIST DOCD IN RCRD: CPT | Performed by: PHYSICIAN ASSISTANT

## 2023-03-14 NOTE — PROGRESS NOTES
Subjective   Keyon Olivas is a 77 y.o. male     Chief Complaint   Patient presents with   • Cath follow up   Problem list:  1.  Coronary artery disease.  1.1.  Coronary artery bypass grafting x3, 5/2000.  By report, the patient was grafted with LIMA to LAD, left radial artery to the RCA, and left radial artery as a T graft from  internal mammary artery to the OM 2.  1.2.  Reported stenting to the RCA and OM 2, 8/2006.  1.3.  Repeat catheterization, 8/2022.  The patient demonstrated patent LIMA to LAD but with diffuse disease distally.  The free radial artery to the RCA was patent but felt to be very small.  No grafting of the OM was noted.  The patient had diffuse disease to the distal LAD, RCA, and circumflex.  Eventually, he did have angioplasty only to the OM.  1.4.  Repeat catheterization, 3/2023, with stenting of the left main and of the first OM.  Anatomy was stable to that mentioned above.  2.  Low normal, but preserved systolic function, estimated EF at 50% by recent echo.  3.  Hypertension  4.  Dyslipidemia.  5.  Ulcerative colitis, apparently with ileostomy, remote.  6.  Nonobstructive carotid artery disease per duplex, 8/2022.      HPI  The patient presents in the clinic today for follow-up status post recent catheterization.  He was scheduled for catheterization because of ongoing, low-level symptoms.  He had stenting of the left main and OM as above.  Anatomy otherwise was stable when compared to catheterization in August, 2022.  Overall, the patient feels improved.  He has far less dyspnea.  He seems to have more energy.  He has no chest pain at this time.  He denies failure nor dysrhythmic symptoms.  He typically is normotensive.  He has no further complaints otherwise.  Of note, cath site is very much stable.      Current Outpatient Medications   Medication Sig Dispense Refill   • aspirin 81 MG EC tablet Take 1 tablet by mouth Daily.     • clopidogrel (PLAVIX) 75 MG tablet Take 1 tablet by mouth  Daily. 90 tablet 3   • ezetimibe (ZETIA) 10 MG tablet Take 1 tablet by mouth Daily. 30 tablet 11   • loperamide (IMODIUM) 2 MG capsule Take 1 capsule by mouth 2 (Two) Times a Day. 30 capsule    • metoprolol tartrate (LOPRESSOR) 25 MG tablet Take 0.5 tablets by mouth 2 (Two) Times a Day. 60 tablet 11   • nitroglycerin (NITROSTAT) 0.4 MG SL tablet Place 1 tablet under the tongue Every 5 (Five) Minutes As Needed for Chest Pain. Take no more than 3 doses in 15 minutes. 35 tablet 5   • tamsulosin (FLOMAX) 0.4 MG capsule 24 hr capsule      • venlafaxine (EFFEXOR) 75 MG tablet        No current facility-administered medications for this visit.       Crestor [rosuvastatin], Lescol [fluvastatin sodium], Lipitor [atorvastatin], Niacin and related, and Penicillins    Past Medical History:   Diagnosis Date   • ANNA MARIE (acute kidney injury) (HCC) 2022   • Anxiety and depression 2017   • Atrial fibrillation (HCC) 2022   • Bilateral carotid artery stenosis 2022   • BPH (benign prostatic hyperplasia) 2017   • CAD (coronary artery disease) 2017   • COVID-19     2022   • COVID-19 vaccine administered     2021, 2021, 03/10/2022 - Moderna   • DJD (degenerative joint disease) 2017   • Dyslipidemia 2017   • Hepatitis C     History of hepatitis C; followed by Dr. Cash in [place].   • Hyperlipidemia    • Hypertension 2017   • Insomnia 2017   • Neuropathy    • DEACON (obstructive sleep apnea) 2017   • Poor short term memory 2017   • Small fiber neuropathy    • Ulcerative colitis (HCC) 2017       Social History     Socioeconomic History   • Marital status:    Tobacco Use   • Smoking status: Former     Packs/day: 1.00     Years: 15.00     Pack years: 15.00     Types: Cigarettes     Start date: 1965     Quit date: 1983     Years since quittin.2   • Smokeless tobacco: Former     Types: Chew   • Tobacco comments:     smoked 10-20 years, < last  "1 PPD   Vaping Use   • Vaping Use: Never used   Substance and Sexual Activity   • Alcohol use: No   • Drug use: No   • Sexual activity: Yes     Partners: Female     Birth control/protection: None       Family History   Problem Relation Age of Onset   • Emphysema Mother    • Heart failure Mother    • Heart attack Father    • Memory loss Brother        Review of Systems   Constitutional: Negative.  Negative for activity change, appetite change, chills, fatigue and fever.   HENT: Negative.  Negative for congestion.    Eyes: Negative for visual disturbance.   Respiratory: Negative.  Negative for apnea, cough, chest tightness, shortness of breath and wheezing.    Cardiovascular: Negative.  Negative for chest pain, palpitations and leg swelling.   Gastrointestinal: Negative.  Negative for blood in stool.   Endocrine: Negative.  Negative for cold intolerance and heat intolerance.   Genitourinary: Negative.  Negative for hematuria.   Musculoskeletal: Positive for gait problem. Negative for arthralgias, back pain, joint swelling, neck pain and neck stiffness.   Skin: Negative.  Negative for color change, rash and wound.   Allergic/Immunologic: Negative.  Negative for environmental allergies and food allergies.   Neurological: Positive for light-headedness. Negative for dizziness, tremors, weakness, numbness and headaches.   Hematological: Bruises/bleeds easily.   Psychiatric/Behavioral: Negative.  Negative for sleep disturbance.       Objective     Vitals:    03/14/23 0855   BP: 119/56   BP Location: Left arm   Patient Position: Sitting   Cuff Size: Adult   Pulse: 75   SpO2: 98%   Weight: 78.9 kg (174 lb)   Height: 167.6 cm (66\")        /56 (BP Location: Left arm, Patient Position: Sitting, Cuff Size: Adult)   Pulse 75   Ht 167.6 cm (66\")   Wt 78.9 kg (174 lb)   SpO2 98%   BMI 28.08 kg/m²      Lab Results (most recent)     None          Physical Exam  Vitals and nursing note reviewed.   Constitutional:       " General: He is not in acute distress.     Appearance: He is well-developed.   HENT:      Head: Normocephalic and atraumatic.   Eyes:      Conjunctiva/sclera: Conjunctivae normal.      Pupils: Pupils are equal, round, and reactive to light.   Neck:      Vascular: No JVD.      Trachea: No tracheal deviation.   Cardiovascular:      Rate and Rhythm: Normal rate and regular rhythm.      Heart sounds: Normal heart sounds.      Comments: Left radial artery absent.  Pulmonary:      Effort: Pulmonary effort is normal.      Breath sounds: Normal breath sounds.   Abdominal:      General: Bowel sounds are normal. There is no distension.      Palpations: Abdomen is soft. There is no mass.      Tenderness: There is no abdominal tenderness. There is no guarding or rebound.   Musculoskeletal:         General: No tenderness or deformity. Normal range of motion.      Cervical back: Normal range of motion and neck supple.   Skin:     General: Skin is warm and dry.      Coloration: Skin is not pale.      Findings: No erythema or rash.   Neurological:      Mental Status: He is alert and oriented to person, place, and time.   Psychiatric:         Behavior: Behavior normal.         Thought Content: Thought content normal.         Judgment: Judgment normal.         Procedure   Procedures         Assessment & Plan      Diagnosis Plan   1. Coronary artery disease involving coronary bypass graft of native heart without angina pectoris        2. Shortness of breath        3. Essential hypertension            1.  The patient appears to be doing fairly well status post recent stenting to the left main and OM.  He has residual, diffuse small vessel disease noted.  He would like to obtain a second opinion for potential intervention of his small vessel disease.  He will be seeing cardiology through [facility], and even expresses his interest in evaluation through [facility].  For now, we have reviewed all of his anatomy by recent cath.   His LIMA to LAD and free radial artery to RCA is patent but with diffuse disease distal to the touchdown of grafts.  He has diffuse small vessel disease otherwise.  I feel that this will be managed medically.  He wants a second opinion on that and will pursue that accordingly.    2.  We will schedule him for routine evaluations through the clinic.  He will follow-up in 4 to 6 months, sooner if indicated by course.    3.  For now, we will continue medications.  He tells me that he has started taking Lipitor again and is able to tolerate that.  We are attempting to obtain appropriate dosing of that.    4.  We will continue his medical regimen otherwise without change.  I am hesitant to further adjust antianginal medications, namely as he is doing fairly well symptomatically.  He also has had severe orthostatic issues recently.  Ranexa would be an option, but as he feels well, I would not adjust further for now.    5.  We will see him routinely through the clinic as above.  He will call back for complications.  We will await recommendations from second opinion.         Patient brought in medicine list to appointment, it's been reviewed with patient and med list was updated in the chart.     Advance Care Planning   ACP discussion was declined by the patient. Patient has an advance directive (not in EMR), copy requested.       Electronically signed by:

## 2023-08-15 ENCOUNTER — LAB (OUTPATIENT)
Dept: LAB | Facility: HOSPITAL | Age: 78
End: 2023-08-15
Payer: MEDICARE

## 2023-08-15 ENCOUNTER — OFFICE VISIT (OUTPATIENT)
Dept: CARDIOLOGY | Facility: CLINIC | Age: 78
End: 2023-08-15
Payer: MEDICARE

## 2023-08-15 VITALS
DIASTOLIC BLOOD PRESSURE: 65 MMHG | HEIGHT: 66 IN | SYSTOLIC BLOOD PRESSURE: 110 MMHG | WEIGHT: 175.6 LBS | BODY MASS INDEX: 28.22 KG/M2 | HEART RATE: 71 BPM | OXYGEN SATURATION: 98 %

## 2023-08-15 DIAGNOSIS — I25.119 CORONARY ARTERY DISEASE INVOLVING NATIVE CORONARY ARTERY OF NATIVE HEART WITH ANGINA PECTORIS: ICD-10-CM

## 2023-08-15 DIAGNOSIS — I10 ESSENTIAL HYPERTENSION: ICD-10-CM

## 2023-08-15 DIAGNOSIS — G47.33 OSA (OBSTRUCTIVE SLEEP APNEA): ICD-10-CM

## 2023-08-15 DIAGNOSIS — I65.23 BILATERAL CAROTID ARTERY STENOSIS: ICD-10-CM

## 2023-08-15 DIAGNOSIS — I48.0 PAROXYSMAL ATRIAL FIBRILLATION: Primary | ICD-10-CM

## 2023-08-15 DIAGNOSIS — E78.5 HYPERLIPIDEMIA LDL GOAL <70: ICD-10-CM

## 2023-08-15 LAB
ALBUMIN SERPL-MCNC: 4.5 G/DL (ref 3.5–5.2)
ALP SERPL-CCNC: 92 U/L (ref 39–117)
ALT SERPL W P-5'-P-CCNC: 47 U/L (ref 1–41)
AST SERPL-CCNC: 35 U/L (ref 1–40)
BILIRUB CONJ SERPL-MCNC: 0.2 MG/DL (ref 0–0.3)
BILIRUB INDIRECT SERPL-MCNC: 0.5 MG/DL
BILIRUB SERPL-MCNC: 0.7 MG/DL (ref 0–1.2)
CHOLEST SERPL-MCNC: 115 MG/DL (ref 0–200)
HDLC SERPL-MCNC: 55 MG/DL (ref 40–60)
LDLC SERPL CALC-MCNC: 40 MG/DL (ref 0–100)
LDLC/HDLC SERPL: 0.7 {RATIO}
PROT SERPL-MCNC: 7.7 G/DL (ref 6–8.5)
TRIGL SERPL-MCNC: 108 MG/DL (ref 0–150)
VLDLC SERPL-MCNC: 20 MG/DL (ref 5–40)

## 2023-08-15 PROCEDURE — 3078F DIAST BP <80 MM HG: CPT | Performed by: INTERNAL MEDICINE

## 2023-08-15 PROCEDURE — 3074F SYST BP LT 130 MM HG: CPT | Performed by: INTERNAL MEDICINE

## 2023-08-15 PROCEDURE — 80061 LIPID PANEL: CPT | Performed by: INTERNAL MEDICINE

## 2023-08-15 PROCEDURE — 80076 HEPATIC FUNCTION PANEL: CPT | Performed by: INTERNAL MEDICINE

## 2023-08-15 PROCEDURE — 99214 OFFICE O/P EST MOD 30 MIN: CPT | Performed by: INTERNAL MEDICINE

## 2023-08-15 RX ORDER — ZOLPIDEM TARTRATE 10 MG/1
10 TABLET ORAL NIGHTLY PRN
COMMUNITY
Start: 2023-07-17

## 2023-08-15 RX ORDER — ATORVASTATIN CALCIUM 80 MG/1
80 TABLET, FILM COATED ORAL NIGHTLY
COMMUNITY
Start: 2023-06-22

## 2023-08-15 RX ORDER — VALSARTAN 40 MG/1
40 TABLET ORAL DAILY
COMMUNITY
Start: 2023-07-28

## 2023-08-15 RX ORDER — TRAMADOL HYDROCHLORIDE 50 MG/1
50 TABLET ORAL
COMMUNITY
Start: 2023-08-14

## 2023-08-15 NOTE — PROGRESS NOTES
Subjective   eKyon Olivas is a 77 y.o. male     Chief Complaint   Patient presents with    Follow-up     Here for 5 mo. F/u    Coronary Artery Disease    Hyperlipidemia    Hypertension    Atrial Fibrillation    Carotid Artery Disease    Sleep Apnea       PROBLEM LIST:     1.  Coronary artery disease.  1.1.  Coronary artery bypass grafting x3, 5/2000.  By report, the patient was grafted with LIMA to LAD, left radial artery to the RCA, and left radial artery as a T graft from  internal mammary artery to the OM 2.  1.2.  Reported stenting to the RCA and OM 2, 8/2006.  1.3.  Repeat catheterization, 8/2022.  The patient demonstrated patent LIMA to LAD but with diffuse disease distally.  The free radial artery to the RCA was patent but felt to be very small.  No grafting of the OM was noted.  The patient had diffuse disease to the distal LAD, RCA, and circumflex.  Eventually, he did have angioplasty only to the OM.  1.4.  Repeat catheterization, 3/2023, with stenting of the left main and of the first OM.  Anatomy was stable to that mentioned above.  2.  Low normal, but preserved systolic function, estimated EF at 50% by recent echo.  3.  Hypertension  4.  Dyslipidemia.  5.  Ulcerative colitis, apparently with ileostomy, remote.  6.  Nonobstructive carotid artery disease per duplex, 8/2022.  7.PAF, brief episode of RVR per 8- [facility] admit. Never started on anti-coag.     Specialty Problems          Cardiology Problems    Coronary artery disease involving native coronary artery of native heart with angina pectoris        Essential hypertension        Hyperlipidemia LDL goal <70        Bilateral carotid artery stenosis        Atrial fibrillation             HPI:    Mr. Olivas returns for follow-up on the above.    Except for an overnight hospital admission for rehydration at [facility] in May of this year Mr. Olivas is continued to do well.  With regard to dehydration he states that he lives alone  and frequently does not remember to drink.  He describes an episode of a more prolonged hospital stay last year due to similar circumstances.  After his episode of dehydration he was extremely weak and short of breath with activity but these are both improving.  Even at its worst his functional capacity remained improved from that he experienced prior to left main and obtuse marginal stenting in March.    The patient continues to be without angina, orthopnea, PND, or lower extremity edema.  He senses no palpitations.  He denies dizziness, presyncope or syncope.  He does not claudicate or describe other symptoms of peripheral arterial disease and he has no symptoms of arterial embolic events to include no symptoms of TIA or stroke.  Blood pressures have been well controlled, we have no recent fasting lipid profile data.                    PRIOR MEDICATIONS    Current Outpatient Medications on File Prior to Visit   Medication Sig Dispense Refill    aspirin 81 MG EC tablet Take 1 tablet by mouth Daily.      atorvastatin (LIPITOR) 80 MG tablet Take 1 tablet by mouth Every Night.      clopidogrel (PLAVIX) 75 MG tablet Take 1 tablet by mouth Daily. 90 tablet 3    ezetimibe (ZETIA) 10 MG tablet Take 1 tablet by mouth Daily. 30 tablet 11    loperamide (IMODIUM) 2 MG capsule Take 1 capsule by mouth 2 (Two) Times a Day. (Patient taking differently: Take 1 capsule by mouth As Needed.) 30 capsule     metoprolol tartrate (LOPRESSOR) 25 MG tablet Take 0.5 tablets by mouth 2 (Two) Times a Day. 60 tablet 11    tamsulosin (FLOMAX) 0.4 MG capsule 24 hr capsule Take 1 capsule by mouth Daily.      traMADol (ULTRAM) 50 MG tablet Take 1 tablet by mouth. prn      valsartan (DIOVAN) 40 MG tablet Take 1 tablet by mouth Daily.      venlafaxine (EFFEXOR) 75 MG tablet Take 1 tablet by mouth 2 (Two) Times a Day.      zolpidem (AMBIEN) 10 MG tablet Take 1 tablet by mouth At Night As Needed.      nitroglycerin (NITROSTAT) 0.4 MG SL tablet Place 1  tablet under the tongue Every 5 (Five) Minutes As Needed for Chest Pain. Take no more than 3 doses in 15 minutes. (Patient not taking: Reported on 8/15/2023) 35 tablet 5     No current facility-administered medications on file prior to visit.       ALLERGIES:    Isosorbide dinitrate, Crestor [rosuvastatin], Lescol [fluvastatin sodium], Lipitor [atorvastatin], Niacin and related, and Penicillins    PAST MEDICAL HISTORY:    Past Medical History:   Diagnosis Date    ANNA MARIE (acute kidney injury) 2022    Anxiety and depression 2017    Atrial fibrillation 2022    Bilateral carotid artery stenosis 2022    BPH (benign prostatic hyperplasia) 2017    CAD (coronary artery disease) 2017    COVID-19     2022    COVID-19 vaccine administered     2021, 2021, 03/10/2022 - Moderna    DJD (degenerative joint disease) 2017    Dyslipidemia 2017    Hepatitis C     History of hepatitis C; followed by Dr. Cash in [place].    Hyperlipidemia     Hypertension 2017    Insomnia 2017    Neuropathy     DEACON (obstructive sleep apnea) 2017    Poor short term memory 2017    Small fiber neuropathy     Ulcerative colitis 2017       SURGICAL HISTORY:    Past Surgical History:   Procedure Laterality Date    CARDIAC CATHETERIZATION  2000    CARDIAC CATHETERIZATION      PCI    COLECTOMY TOTAL      Ulcerative colitis, status post total colectomy with ileostomy.     CORONARY ARTERY BYPASS GRAFT  05/11/2000    x3    CORONARY STENT PLACEMENT  2006       SOCIAL HISTORY:    Social History     Socioeconomic History    Marital status:    Tobacco Use    Smoking status: Former     Packs/day: 1.00     Years: 15.00     Pack years: 15.00     Types: Cigarettes     Start date: 1965     Quit date: 1983     Years since quittin.6    Smokeless tobacco: Former     Types: Chew    Tobacco comments:     smoked 10-20 years, < last 1 PPD   Vaping Use    Vaping  "Use: Never used   Substance and Sexual Activity    Alcohol use: No    Drug use: No    Sexual activity: Yes     Partners: Female     Birth control/protection: None       FAMILY HISTORY:    Family History   Problem Relation Age of Onset    Emphysema Mother     Heart failure Mother     Heart attack Father     Memory loss Brother        Review of Systems   Constitutional:  Positive for fatigue (improving).   HENT: Negative.     Eyes:  Positive for visual disturbance (glasses prn).   Respiratory:  Positive for shortness of breath.         Denies orthopnea/PND   Cardiovascular:  Positive for chest pain and palpitations. Negative for leg swelling.   Gastrointestinal: Negative.    Endocrine: Negative.    Genitourinary: Negative.    Musculoskeletal: Negative.         Denies leg cramps with ambulation   Skin: Negative.    Allergic/Immunologic: Negative.    Neurological: Negative.         Denies stroke like sx's   Hematological:  Bruises/bleeds easily.   Psychiatric/Behavioral: Negative.       VISIT VITALS:  Vitals:    08/15/23 1141   BP: 110/65   BP Location: Left arm   Patient Position: Sitting   Pulse: 71   SpO2: 98%   Weight: 79.7 kg (175 lb 9.6 oz)   Height: 167.6 cm (65.98\")      /65 (BP Location: Left arm, Patient Position: Sitting)   Pulse 71   Ht 167.6 cm (65.98\")   Wt 79.7 kg (175 lb 9.6 oz)   SpO2 98%   BMI 28.36 kg/mý     RECENT LABS:    Objective       Physical Exam  Vitals and nursing note reviewed.   Constitutional:       General: He is not in acute distress.     Appearance: He is well-developed.   HENT:      Head: Normocephalic and atraumatic.   Eyes:      Conjunctiva/sclera: Conjunctivae normal.      Pupils: Pupils are equal, round, and reactive to light.   Neck:      Vascular: No carotid bruit, hepatojugular reflux or JVD.      Trachea: No tracheal deviation.      Comments: Nl. Carotid upstrokes  Cardiovascular:      Rate and Rhythm: Normal rate and regular rhythm.      Pulses:           Radial " pulses are 2+ on the right side and 2+ on the left side.      Heart sounds: Normal heart sounds, S1 normal and S2 normal. No murmur heard.    No friction rub. No S3 or S4 sounds.   Pulmonary:      Effort: Pulmonary effort is normal.      Breath sounds: Normal breath sounds. No wheezing, rhonchi or rales.      Comments: Nl. Expir. Phase  Nl. Breath sound intensity  Abdominal:      General: Bowel sounds are normal. There is no distension or abdominal bruit.      Palpations: Abdomen is soft. There is no mass.      Tenderness: There is no abdominal tenderness. There is no guarding or rebound.      Comments: No organomegaly   Musculoskeletal:         General: No tenderness or deformity. Normal range of motion.      Cervical back: Normal range of motion and neck supple.      Right lower leg: No edema.      Left lower leg: No edema.      Comments: LLE, no edema  RLE, no edema  Unable to palpitate Pedal pulses. Denies sx's of claudication   Skin:     General: Skin is warm and dry.      Coloration: Skin is not pale.      Findings: No erythema or rash.   Neurological:      Mental Status: He is alert and oriented to person, place, and time.   Psychiatric:         Behavior: Behavior normal.         Thought Content: Thought content normal.         Judgment: Judgment normal.       Procedures      Assessment & Plan   #1.  Coronary artery disease.  Mr. Olivas has maintained the improvement he experienced after left main and obtuse marginal stenting March of this year.  Medications are appropriate.  We will continue his present.    2.  Treated and controlled systemic hypertension.    3.  Treated dyslipidemia.  We will check fasting lipid profile and liver enzymes.    4.  Questionable paroxysmal atrial fibrillation.  Mr. Olivas is not anticoagulated but should be for stroke prophylaxis unless there is a contraindication, or he has refused therapy.  We will review records further and follow-up appropriately.    5.  Mr. Olivas will follow  with Dr. Olivas as instructed, with Dr. Caballero as scheduled, and we will plan on seeing him in follow-up in 6 months  or on appearing basis for symptoms as discussed in detail today.   Diagnosis Plan   1. Paroxysmal atrial fibrillation        2. Bilateral carotid artery stenosis        3. Coronary artery disease involving native coronary artery of native heart with angina pectoris        4. Essential hypertension        5. Hyperlipidemia LDL goal <70        6. DEACON (obstructive sleep apnea)            No follow-ups on file.         Keyon Olivas  reports that he quit smoking about 40 years ago. His smoking use included cigarettes. He started smoking about 58 years ago. He has a 15.00 pack-year smoking history. He has quit using smokeless tobacco.  His smokeless tobacco use included chew.. I have educated him on the risk of diseases from using tobacco products such as cancer, COPD, and heart disease.       Advance Care Planning   ACP discussion was held with the patient during this visit. Patient has an advance directive (not in EMR), copy requested.            BMI is >= 25 and <30. (Overweight) The following options were offered after discussion;: pcp addressing               Electronically signed by:    Scribed for Juan C Samaniego MD by Greer Grullon LPN on August 15, 2023  at 11:48 EDT    I, Juan C Samaniego MD personally performed the services described in this documentation as scribed by the above named individual in my presence, and it is both accurate and complete. August 15, 2023 11:48 EDT      Dictated Utilizing Dragon Dictation: Part of this note may be an electronic transcription/translation of spoken language to printed text using the Dragon Dictation System.

## 2023-08-21 ENCOUNTER — TELEPHONE (OUTPATIENT)
Dept: CARDIOLOGY | Facility: CLINIC | Age: 78
End: 2023-08-21
Payer: MEDICARE

## 2023-08-21 DIAGNOSIS — I48.91 ATRIAL FIBRILLATION WITH RVR: Primary | ICD-10-CM

## 2023-08-21 NOTE — TELEPHONE ENCOUNTER
SERIOUS DAY 6 EVENT MONITOR RECORDING INDICATING A-FIB . REVIEWED BY DR. GRACE AND V/O RECEIVED TO SEND IN ELIQUIS 5 MG PO BID. SCRIPT SENT TO [facility]. CALLED AND RELAYED TO MR. MARLOW AND HE WAS AGREEABLE TO START ELIQUIS. HE IS COMING BY THE OFFICE TO  10.00 COPAY CARD, 30 DAY FREE TRIAL AND PATIENT ASSISTANCE FORMS IF NEEDED. HE NIS AWARE IF ELIQUIS IS EXPENSIVE TO COMPLETE FORMS AND RETURN TO THE OFFICE ASAP. VERBALIZED HE UNDERSTOOD. PH,LPN    PATIENT IS ALSO TAKING ASA AND PLAVIX. KNOWN HX CAD. PATIENT STATES AFTER CABG HE HAD SOME SHORT TERM MEMORY LOSS AFTER CABG AND FMILY PCP SAID COULD HAVE HAD A TIA OR CVA. PER DR. GRACE D/C ASA AND STAY ON ELIQUIS AND PLAVIX. . PATIENT AWARE, VERBALIZED OK. PH,LPN

## 2023-08-22 DIAGNOSIS — I48.91 ATRIAL FIBRILLATION WITH RVR: ICD-10-CM

## 2023-08-23 ENCOUNTER — TELEPHONE (OUTPATIENT)
Dept: CARDIOLOGY | Facility: CLINIC | Age: 78
End: 2023-08-23
Payer: MEDICARE

## 2023-08-23 DIAGNOSIS — I25.810 CORONARY ARTERY DISEASE INVOLVING CORONARY BYPASS GRAFT OF NATIVE HEART WITHOUT ANGINA PECTORIS: ICD-10-CM

## 2023-08-23 DIAGNOSIS — I10 PRIMARY HYPERTENSION: ICD-10-CM

## 2023-08-23 RX ORDER — VALSARTAN 40 MG/1
20 TABLET ORAL DAILY
Qty: 45 TABLET | Refills: 3 | Status: SHIPPED | OUTPATIENT
Start: 2023-08-23

## 2023-08-23 NOTE — TELEPHONE ENCOUNTER
Serious monitor report of SVT reviewed by Dr Samaniego with order to decrease Valsartan to 20 mg daily and increase Metoprolol to 25 mg BID. Patient aware and verbalized understanding.

## 2023-09-19 ENCOUNTER — TELEPHONE (OUTPATIENT)
Dept: CARDIOLOGY | Facility: CLINIC | Age: 78
End: 2023-09-19
Payer: MEDICARE

## 2023-09-19 NOTE — TELEPHONE ENCOUNTER
Received cardiac clearance request from  stating pt has caudal block scheduled for 10/03/2023 and is requiring a cardiac clearance. Placed cardiac clearance request in Pat's inbox to review and address with provider.

## 2023-09-20 NOTE — TELEPHONE ENCOUNTER
PER DR. GRACE: ACCEPTABLE RISK FOR CAUDAL BLOCK WITH [facility] ON 10-3-2023 AND MAY HOLD ELIQUIS 3 DAYS, BUT MUST STAY ON PLAVIX DUE TO RECENT CARDIAC STENTING 3-2023. CARDIAC CLEAR. LETTER FAXED TO FACILITY. CHACHA,LPN

## 2023-09-27 ENCOUNTER — TELEPHONE (OUTPATIENT)
Dept: CARDIOLOGY | Facility: CLINIC | Age: 78
End: 2023-09-27
Payer: MEDICARE

## 2023-09-27 NOTE — TELEPHONE ENCOUNTER
PATIENT NOTIFIED OF MONITOR RESULTS AND AWARE SOMEONE WILL CALL HIM WITH A 2-4 WEEK F/U APPT. VERBALIZED OK. MICHELLE BURNHAM               ----- Message from Juan C Samaniego MD sent at 9/27/2023  9:04 AM EDT -----  Needs 2-4 week f/u appt.   ----- Message -----  From: Juan C Samaniego MD  Sent: 9/24/2023   8:44 PM EDT  To: Juan C Samaniego MD

## 2023-10-02 ENCOUNTER — TELEPHONE (OUTPATIENT)
Dept: CARDIOLOGY | Facility: CLINIC | Age: 78
End: 2023-10-02
Payer: MEDICARE

## 2023-10-18 ENCOUNTER — LAB (OUTPATIENT)
Dept: LAB | Facility: HOSPITAL | Age: 78
End: 2023-10-18
Payer: MEDICARE

## 2023-10-18 ENCOUNTER — OFFICE VISIT (OUTPATIENT)
Dept: CARDIOLOGY | Facility: CLINIC | Age: 78
End: 2023-10-18
Payer: MEDICARE

## 2023-10-18 VITALS
HEIGHT: 66 IN | SYSTOLIC BLOOD PRESSURE: 137 MMHG | DIASTOLIC BLOOD PRESSURE: 77 MMHG | BODY MASS INDEX: 29.15 KG/M2 | HEART RATE: 81 BPM | OXYGEN SATURATION: 98 % | WEIGHT: 181.4 LBS

## 2023-10-18 DIAGNOSIS — I48.0 PAROXYSMAL ATRIAL FIBRILLATION: Primary | ICD-10-CM

## 2023-10-18 DIAGNOSIS — Z79.01 CURRENT USE OF LONG TERM ANTICOAGULATION: ICD-10-CM

## 2023-10-18 DIAGNOSIS — I25.810 CORONARY ARTERY DISEASE INVOLVING CORONARY BYPASS GRAFT OF NATIVE HEART WITHOUT ANGINA PECTORIS: ICD-10-CM

## 2023-10-18 DIAGNOSIS — R06.02 SHORTNESS OF BREATH: ICD-10-CM

## 2023-10-18 LAB
BASOPHILS # BLD AUTO: 0.08 10*3/MM3 (ref 0–0.2)
BASOPHILS NFR BLD AUTO: 1 % (ref 0–1.5)
DEPRECATED RDW RBC AUTO: 45.2 FL (ref 37–54)
EOSINOPHIL # BLD AUTO: 0.47 10*3/MM3 (ref 0–0.4)
EOSINOPHIL NFR BLD AUTO: 5.7 % (ref 0.3–6.2)
ERYTHROCYTE [DISTWIDTH] IN BLOOD BY AUTOMATED COUNT: 14.3 % (ref 12.3–15.4)
HCT VFR BLD AUTO: 38.1 % (ref 37.5–51)
HEMOCCULT STL QL: NEGATIVE
HGB BLD-MCNC: 12.4 G/DL (ref 13–17.7)
IMM GRANULOCYTES # BLD AUTO: 0.03 10*3/MM3 (ref 0–0.05)
IMM GRANULOCYTES NFR BLD AUTO: 0.4 % (ref 0–0.5)
LYMPHOCYTES # BLD AUTO: 1.65 10*3/MM3 (ref 0.7–3.1)
LYMPHOCYTES NFR BLD AUTO: 20.1 % (ref 19.6–45.3)
MCH RBC QN AUTO: 28.8 PG (ref 26.6–33)
MCHC RBC AUTO-ENTMCNC: 32.5 G/DL (ref 31.5–35.7)
MCV RBC AUTO: 88.6 FL (ref 79–97)
MONOCYTES # BLD AUTO: 0.87 10*3/MM3 (ref 0.1–0.9)
MONOCYTES NFR BLD AUTO: 10.6 % (ref 5–12)
NEUTROPHILS NFR BLD AUTO: 5.1 10*3/MM3 (ref 1.7–7)
NEUTROPHILS NFR BLD AUTO: 62.2 % (ref 42.7–76)
NRBC BLD AUTO-RTO: 0 /100 WBC (ref 0–0.2)
PLATELET # BLD AUTO: 165 10*3/MM3 (ref 140–450)
PMV BLD AUTO: 10.2 FL (ref 6–12)
RBC # BLD AUTO: 4.3 10*6/MM3 (ref 4.14–5.8)
WBC NRBC COR # BLD: 8.2 10*3/MM3 (ref 3.4–10.8)

## 2023-10-18 PROCEDURE — 85025 COMPLETE CBC W/AUTO DIFF WBC: CPT | Performed by: CLINICAL NURSE SPECIALIST

## 2023-10-18 PROCEDURE — 82270 OCCULT BLOOD FECES: CPT | Performed by: CLINICAL NURSE SPECIALIST

## 2023-10-18 RX ORDER — LANOLIN ALCOHOL/MO/W.PET/CERES
1000 CREAM (GRAM) TOPICAL DAILY
COMMUNITY

## 2023-10-18 RX ORDER — MELATONIN
1000 DAILY
COMMUNITY

## 2023-10-18 NOTE — PROGRESS NOTES
Subjective     Keyon Olivas is a 78 y.o. male who presents today for Follow-up (Abn. Monitor results).    CHIEF COMPLIANT  Chief Complaint   Patient presents with    Follow-up     Abn. Monitor results       Active Problems:  1.  Coronary artery disease.  1.1.  Coronary artery bypass grafting x3, 5/2000.  By report, the patient was grafted with LIMA to LAD, left radial artery to the RCA, and left radial artery as a T graft from  internal mammary artery to the OM 2.  1.2.  Reported stenting to the RCA and OM 2, 8/2006.  1.3.  Repeat catheterization, 8/2022.  The patient demonstrated patent LIMA to LAD but with diffuse disease distally.  The free radial artery to the RCA was patent but felt to be very small.  No grafting of the OM was noted.  The patient had diffuse disease to the distal LAD, RCA, and circumflex.  Eventually, he did have angioplasty only to the OM.  1.4.  Repeat catheterization, 3/2023, with stenting of the left main and of the first OM.  Anatomy was stable to that mentioned above.  2.  Low normal, but preserved systolic function, estimated EF at 50% by recent echo.  3.  Hypertension  4.  Dyslipidemia.  5.  Ulcerative colitis, apparently with ileostomy, remote.  6.  Nonobstructive carotid artery disease per duplex, 8/2022.  7.PAF, brief episode of RVR per 8- [facility] admit. Never started on anti-coag.   8.  Alert received on recent event monitor indicating episode of paroxysmal atrial fibrillation.  Reviewed by Dr. Samaniego and patient started on Eliquis for long-term anticoagulation.       HPI  The patient is a 78-year-old male that returns for follow-up.  He was placed on a recent event monitor by Dr. Samaniego which did show paroxysmal atrial fibrillation and the patient was subsequently started on Eliquis for long-term anticoagulation.  The patient states that since starting Eliquis his stool has changed in consistency.  He does have an ostomy.  He does not see jamaal blood and states that his stool  is not black.  He is just concerned and the changes in consistency since starting the anticoagulant.  The patient's metoprolol was also increased which is making him feel tired.  He denies chest pain but does state he is noted slightly increased shortness of air with exertion.  The patient is concerned about staying on anticoagulation long-term due to ulcerative colitis and ileostomy and would like to have referral to EP to discuss other options.      PRIOR MEDS  Current Outpatient Medications on File Prior to Visit   Medication Sig Dispense Refill    apixaban (ELIQUIS) 5 MG tablet tablet Take 1 tablet by mouth 2 (Two) Times a Day. 180 tablet 3    atorvastatin (LIPITOR) 80 MG tablet Take 1 tablet by mouth Every Night.      Cholecalciferol 25 MCG (1000 UT) tablet Take 1 tablet by mouth Daily.      clopidogrel (PLAVIX) 75 MG tablet Take 1 tablet by mouth Daily. 90 tablet 3    ezetimibe (ZETIA) 10 MG tablet Take 1 tablet by mouth Daily. 30 tablet 11    loperamide (IMODIUM) 2 MG capsule Take 1 capsule by mouth 2 (Two) Times a Day. (Patient taking differently: Take 1 capsule by mouth As Needed.) 30 capsule     metoprolol tartrate (LOPRESSOR) 25 MG tablet Take 1 tablet by mouth 2 (Two) Times a Day. 180 tablet 3    nitroglycerin (NITROSTAT) 0.4 MG SL tablet Place 1 tablet under the tongue Every 5 (Five) Minutes As Needed for Chest Pain. Take no more than 3 doses in 15 minutes. 35 tablet 5    tamsulosin (FLOMAX) 0.4 MG capsule 24 hr capsule Take 1 capsule by mouth Daily.      traMADol (ULTRAM) 50 MG tablet Take 1 tablet by mouth. prn      valsartan (DIOVAN) 40 MG tablet Take 0.5 tablets by mouth Daily. 45 tablet 3    venlafaxine (EFFEXOR) 75 MG tablet Take 1 tablet by mouth 2 (Two) Times a Day.      vitamin B-12 (CYANOCOBALAMIN) 1000 MCG tablet Take 1 tablet by mouth Daily.      zolpidem (AMBIEN) 10 MG tablet Take 1 tablet by mouth At Night As Needed.       No current facility-administered medications on file prior to  visit.       ALLERGIES  Isosorbide dinitrate, Crestor [rosuvastatin], Lescol [fluvastatin sodium], Lipitor [atorvastatin], Niacin and related, and Penicillins    HISTORY  Past Medical History:   Diagnosis Date    ANNA MARIE (acute kidney injury) 2022    Anxiety and depression 2017    Atrial fibrillation 2022    Bilateral carotid artery stenosis 2022    BPH (benign prostatic hyperplasia) 2017    CAD (coronary artery disease) 2017    COVID-19     2022    COVID-19 vaccine administered     2021, 2021, 03/10/2022 - Moderna    DJD (degenerative joint disease) 2017    Dyslipidemia 2017    Hepatitis C     History of hepatitis C; followed by Dr. Cash in [place].    Hyperlipidemia     Hypertension 2017    Insomnia 2017    Neuropathy     DEACON (obstructive sleep apnea) 2017    Poor short term memory 2017    Small fiber neuropathy     Ulcerative colitis 2017       Social History     Socioeconomic History    Marital status:    Tobacco Use    Smoking status: Former     Packs/day: 1.00     Years: 15.00     Additional pack years: 0.00     Total pack years: 15.00     Types: Cigarettes     Start date: 1965     Quit date: 1983     Years since quittin.8    Smokeless tobacco: Former     Types: Chew    Tobacco comments:     smoked 10-20 years, < last 1 PPD   Vaping Use    Vaping Use: Never used   Substance and Sexual Activity    Alcohol use: No    Drug use: No    Sexual activity: Yes     Partners: Female     Birth control/protection: None       Family History   Problem Relation Age of Onset    Emphysema Mother     Heart failure Mother     Heart attack Father     Memory loss Brother        Review of Systems   Constitutional:  Positive for fatigue. Negative for chills, diaphoresis and fever.   HENT: Negative.     Eyes: Negative.  Negative for visual disturbance.   Respiratory:  Positive for shortness of breath (standing up or exertion).  "Negative for apnea, cough, chest tightness and wheezing.    Cardiovascular:  Positive for palpitations (fluttering; irregular heart beat occas.). Negative for chest pain and leg swelling.   Gastrointestinal:  Negative for blood in stool.        Darker color stool since starting Eliquis    Endocrine: Negative.  Negative for cold intolerance and heat intolerance.   Genitourinary: Negative.  Negative for hematuria.   Musculoskeletal:  Positive for back pain. Negative for arthralgias, myalgias, neck pain and neck stiffness.   Skin:  Negative for color change, rash and wound.   Allergic/Immunologic: Negative.  Negative for environmental allergies and food allergies.   Neurological:  Positive for dizziness (when standing up; hypotension and tachycardia) and light-headedness. Negative for syncope, weakness, numbness and headaches.   Hematological:  Bruises/bleeds easily.   Psychiatric/Behavioral:  Positive for sleep disturbance (palps more frequent at night).        Objective     VITALS: /77 (BP Location: Left arm, Patient Position: Sitting)   Pulse 81   Ht 167.6 cm (65.98\")   Wt 82.3 kg (181 lb 6.4 oz)   SpO2 98%   BMI 29.29 kg/m²     LABS:   Lab Results (most recent)       None            IMAGING:   No Images in the past 120 days found..    EXAM:  Physical Exam  Constitutional:       Appearance: Normal appearance.   Eyes:      Pupils: Pupils are equal, round, and reactive to light.   Cardiovascular:      Rate and Rhythm: Normal rate and regular rhythm.      Pulses:           Carotid pulses are 2+ on the right side and 2+ on the left side.       Radial pulses are 2+ on the right side and 2+ on the left side.        Dorsalis pedis pulses are 2+ on the right side and 2+ on the left side.        Posterior tibial pulses are 2+ on the right side and 2+ on the left side.      Heart sounds: Normal heart sounds.   Pulmonary:      Effort: Pulmonary effort is normal.      Breath sounds: Normal breath sounds.   Abdominal: "      General: Bowel sounds are normal.      Palpations: Abdomen is soft.   Musculoskeletal:      Right lower leg: No edema.      Left lower leg: No edema.   Skin:     General: Skin is warm and dry.      Capillary Refill: Capillary refill takes less than 2 seconds.   Neurological:      General: No focal deficit present.      Mental Status: He is alert and oriented to person, place, and time.   Psychiatric:         Mood and Affect: Mood normal.         Thought Content: Thought content normal.         Procedure     ECG 12 Lead    Date/Time: 10/18/2023 2:01 PM  Performed by: Anay Headley APRN    Authorized by: Anay Headley APRN  Comparison: compared with previous ECG   Rhythm: sinus rhythm  Rate: normal  Conduction: conduction normal  ST Segments: ST segments normal  T Waves: T waves normal  QRS axis: normal             Assessment & Plan    Diagnosis Plan   1. Paroxysmal atrial fibrillation        2. Coronary artery disease involving coronary bypass graft of native heart without angina pectoris        3. Current use of long term anticoagulation  CBC & Differential    Occult Blood X 1, Stool - Stool, Per Rectum      4. Shortness of breath          Plan:  1.  Paroxysmal atrial fibrillation: The patient was recently started on long-term anticoagulation due to evidence of paroxysmal atrial fibrillation which was noted on event monitor.  Since starting anticoagulant he has noticed a change in consistency in his stool.  He does have a history of ulcerative colitis and has an ileostomy and has concerns about not on anticoagulation long-term.  She has also had increased fatigue since beta-blocker has been increased.  The patient is requesting EP referral to discuss other options including possible ablation and Watchman procedure.  We will check a CBC and stool occult blood.  2.  CAD: Per recommendation of Dr. Samaniego the patient was taken off aspirin and will continue on Eliquis and Plavix.  We will continue  statin, valsartan, metoprolol and Zetia.  3.  Shortness of air: We will check a CBC.    Return in about 3 months (around 1/18/2024).        Patient brought in medicine list to appointment, it's been reviewed with patient and med list was updated in the chart.        Advance Care Planning   ACP discussion was held with the patient during this visit. Patient has an advance directive (not in EMR), copy requested.               MEDS ORDERED DURING VISIT:  No orders of the defined types were placed in this encounter.      DISCONTINUED MEDS DURING VISIT:   There are no discontinued medications.       This document has been electronically signed by LISSET Mcpherson  October 22, 2023 16:45 EDT    Dictated Utilizing Dragon Dictation: Part of this note may be an electronic transcription/translation of spoken language to printed text using the Dragon Dictation System

## 2023-10-19 ENCOUNTER — TELEPHONE (OUTPATIENT)
Dept: CARDIOLOGY | Facility: CLINIC | Age: 78
End: 2023-10-19
Payer: MEDICARE

## 2023-10-19 NOTE — PROGRESS NOTES
Please let Mr Olivas know his Hemoglobin is stable.  Will call him with occult blood when we receive this back.

## 2023-10-19 NOTE — TELEPHONE ENCOUNTER
Notified pt of lab results. Pt is aware of results and that we will call him when we get his other test results back.  Pt verbalizes understanding and is aware to call with any questions or concerns.

## 2023-10-19 NOTE — TELEPHONE ENCOUNTER
----- Message from LISSET Ferrer sent at 10/19/2023  8:12 AM EDT -----  Please let Mr Olivas know his Hemoglobin is stable.  Will call him with occult blood when we receive this back.

## 2023-11-07 ENCOUNTER — TELEPHONE (OUTPATIENT)
Dept: CARDIOLOGY | Facility: CLINIC | Age: 78
End: 2023-11-07
Payer: MEDICARE

## 2023-11-07 NOTE — TELEPHONE ENCOUNTER
Caller: Keyon Olivas    Relationship: Self    Best call back number: [phone]     What is the best time to reach you: ANYTIME     Who are you requesting to speak with (clinical staff, provider,  specific staff member): CLINICAL    What was the call regarding: PT IS SCHD TO HAVE AN EP ON 12/8/23 IN [place], PT IS WONDERING IF HE CAN HAVE THE ablation DONE IN [place] AS WELL AT THE SAME TIME. IF HE CANT, HE WOULD LIKE TO RESCHD SO HE DOESN'T HAVE TO GO THOUGH THAT TWICE.     Is it okay if the provider responds through MyChart: CALL

## 2023-11-07 NOTE — TELEPHONE ENCOUNTER
[facility] [place]     Notified pt of LISSET Dior's recommendations. Pt verbalizes understanding.  Pt is aware to call with any questions or concerns.

## 2023-12-08 ENCOUNTER — OFFICE VISIT (OUTPATIENT)
Dept: CARDIOLOGY | Facility: CLINIC | Age: 78
End: 2023-12-08
Payer: MEDICARE

## 2023-12-08 VITALS
HEART RATE: 91 BPM | WEIGHT: 180 LBS | BODY MASS INDEX: 28.93 KG/M2 | HEIGHT: 66 IN | SYSTOLIC BLOOD PRESSURE: 122 MMHG | OXYGEN SATURATION: 100 % | DIASTOLIC BLOOD PRESSURE: 60 MMHG

## 2023-12-08 DIAGNOSIS — I25.119 CORONARY ARTERY DISEASE INVOLVING NATIVE CORONARY ARTERY OF NATIVE HEART WITH ANGINA PECTORIS: Chronic | ICD-10-CM

## 2023-12-08 DIAGNOSIS — I48.0 PAROXYSMAL ATRIAL FIBRILLATION: Primary | ICD-10-CM

## 2023-12-08 DIAGNOSIS — K51.811 OTHER ULCERATIVE COLITIS WITH RECTAL BLEEDING: Chronic | ICD-10-CM

## 2023-12-08 NOTE — ASSESSMENT & PLAN NOTE
Symptomatic paroxysmal atrial fibrillation  - patient would like to pursue AF ablation as his rhythm control option; in addition he would like to proceed with JASMINA occlusion device implantation procedure  - After extensive conversation regarding risks, benefits, or alternatives to the therapy, patient elected to proceed with the AF RFA and Watchman procedure   - CTA prior   - AF RFA with Carto; he notes that his apple watch has been giving him 'SVT warnings' in addition to his AF.    - Watchman procedure 2-3 months after his AF RFA  - continue apixaban and metoprolol

## 2023-12-08 NOTE — PROGRESS NOTES
Electrophysiology Clinic Consult     Keyon Olivas  [id number]  1945    Referring Provider: Anay Headley APRN   PCP: Osman Olivas MD  [address] [place] [address] / [facility] [id number]    Date of Service: 12/08/23    Chief Complaint   Patient presents with    Atrial Fibrillation     Problem List  1.  Coronary artery disease.  1.1.  Coronary artery bypass grafting x3, 5/2000.  By report, the patient was grafted with LIMA to LAD, left radial artery to the RCA, and left radial artery as a T graft from  internal mammary artery to the OM 2.  1.2.  Reported stenting to the RCA and OM 2, 8/2006.  1.3.  Repeat catheterization, 8/2022.  The patient demonstrated patent LIMA to LAD but with diffuse disease distally.  The free radial artery to the RCA was patent but felt to be very small.  No grafting of the OM was noted.  The patient had diffuse disease to the distal LAD, RCA, and circumflex.  Eventually, he did have angioplasty only to the OM.  1.4.  Repeat catheterization, 3/2023, with stenting of the left main and of the first OM.  Anatomy was stable to that mentioned above.  2.  Low normal, but preserved systolic function, estimated EF at 50% by recent echo.  3.  Hypertension  4.  Dyslipidemia.  5.  Ulcerative colitis, apparently with ileostomy, remote.  6.  Nonobstructive carotid artery disease per duplex, 8/2022.  7.PAF, brief episode of RVR per 8- [facility] admit. Never started on anti-coag.   8.  Alert received on recent event monitor indicating episode of paroxysmal atrial fibrillation.  Reviewed by Dr. Samaniego and patient started on Eliquis for long-term anticoagulation.    History of Present Illness  Keyon Olivas is a 78 y.o. male who presents to my electrophysiology clinic for evaluation of AF.  Patient has a past medical history of paroxysmal atrial fibrillation which appears to be very symptomatic-he notes that he is he gets short of breath and fatigue on exertion whenever he is not A-fib  episode.  Additionally patient also has a history of ulcerative colitis with hx of GIB per patient. He is here to discuss further management options re: AF.     Review of Systems   Constitutional:  Positive for fatigue. Negative for activity change and fever.   HENT:  Positive for nosebleeds.    Respiratory:  Negative for chest tightness and shortness of breath.    Cardiovascular:  Positive for palpitations. Negative for chest pain and leg swelling.   Gastrointestinal:  Negative for constipation and diarrhea.   Genitourinary:  Negative for decreased urine volume and difficulty urinating.   Skin:  Negative for wound.   Neurological:  Positive for weakness. Negative for dizziness, syncope and light-headedness.   Psychiatric/Behavioral:  Negative for suicidal ideas.        Outpatient Medications Marked as Taking for the 12/8/23 encounter (Office Visit) with Nilesh Murdock MD   Medication Sig Dispense Refill    apixaban (ELIQUIS) 5 MG tablet tablet Take 1 tablet by mouth 2 (Two) Times a Day. 180 tablet 3    atorvastatin (LIPITOR) 80 MG tablet Take 1 tablet by mouth Every Night.      Cholecalciferol 25 MCG (1000 UT) tablet Take 1 tablet by mouth Daily.      clopidogrel (PLAVIX) 75 MG tablet Take 1 tablet by mouth Daily. 90 tablet 3    ezetimibe (ZETIA) 10 MG tablet Take 1 tablet by mouth Daily. 30 tablet 11    loperamide (IMODIUM) 2 MG capsule Take 1 capsule by mouth 2 (Two) Times a Day. (Patient taking differently: Take 1 capsule by mouth As Needed.) 30 capsule     metoprolol tartrate (LOPRESSOR) 25 MG tablet Take 1 tablet by mouth 2 (Two) Times a Day. 180 tablet 3    nitroglycerin (NITROSTAT) 0.4 MG SL tablet Place 1 tablet under the tongue Every 5 (Five) Minutes As Needed for Chest Pain. Take no more than 3 doses in 15 minutes. 35 tablet 5    tamsulosin (FLOMAX) 0.4 MG capsule 24 hr capsule Take 1 capsule by mouth Daily.      traMADol (ULTRAM) 50 MG tablet Take 1 tablet by mouth. prn      valsartan (DIOVAN) 40 MG tablet  "Take 0.5 tablets by mouth Daily. 45 tablet 3    venlafaxine (EFFEXOR) 75 MG tablet Take 1 tablet by mouth 2 (Two) Times a Day.      vitamin B-12 (CYANOCOBALAMIN) 1000 MCG tablet Take 1 tablet by mouth Daily.      zolpidem (AMBIEN) 10 MG tablet Take 1 tablet by mouth At Night As Needed.         Physical Exam  Vitals:    12/08/23 1043   BP: 122/60   BP Location: Left arm   Patient Position: Sitting   Pulse: 91   SpO2: 100%   Weight: 81.6 kg (180 lb)   Height: 167.6 cm (66\")     GENERAL: Well-developed, well-nourished patient in no acute distress.  HEENT: NC, AC, PERRLA. MMM  NECK: No JVD. No carotid bruits auscultated.  LUNGS: Clear to auscultation bilaterally.  CARDIOVASCULAR: RRR No murmurs, gallops or rubs noted.   ABDOMEN: Soft, nontender. Positive bowel sounds.  MUSCULOSKELETAL: No gross deformities. No clubbing, cyanosis  EXT: pulses intact, No edema  SKIN: Pink, warm  Neuro: Nonfocal exam. Gait intact    Diagnostic Data    ECG 12 Lead    Date/Time: 12/8/2023 12:28 PM  Performed by: Nilesh Murdock MD    Authorized by: Nilesh Murdock MD  Comparison: not compared with previous ECG   Rhythm: sinus rhythm  Rate: normal  Conduction: conduction normal  QRS axis: normal  Other: no other findings    Clinical impression: normal ECG          Lab Results   Component Value Date    GLUCOSE 88 02/07/2023    CALCIUM 9.3 02/07/2023     02/07/2023    K 4.9 02/07/2023    CO2 22.3 02/07/2023     02/07/2023    BUN 18 02/07/2023    CREATININE 1.15 02/07/2023    EGFRIFNONA 63 01/29/2020    BCR 15.7 02/07/2023    ANIONGAP 9.7 02/07/2023     Lab Results   Component Value Date    WBC 8.20 10/18/2023    HGB 12.4 (L) 10/18/2023    HCT 38.1 10/18/2023    MCV 88.6 10/18/2023     10/18/2023     Lab Results   Component Value Date    INR 1.1 12/07/2022     Lab Results   Component Value Date    TSH 2.080 08/31/2022       Cardiac Testing:  TTE 8/2022  Left ventricular systolic function is normal. Estimated left ventricular EF = " 50%.  Left ventricular wall thickness is consistent with mild concentric hypertrophy.  The aortic valve is calcified with trace to mild aortic insufficiency no significant stenosis.  Estimated right ventricular systolic pressure from tricuspid regurgitation is normal (<35 mmHg).    I personally viewed and interpreted the patient's EKG/Telemetry/lab data      Assessment and Plan   Diagnoses and all orders for this visit:    1. Paroxysmal atrial fibrillation (Primary)  Assessment & Plan:  Symptomatic paroxysmal atrial fibrillation  - patient would like to pursue AF ablation as his rhythm control option; in addition he would like to proceed with JASMINA occlusion device implantation procedure  - After extensive conversation regarding risks, benefits, or alternatives to the therapy, patient elected to proceed with the AF RFA and Watchman procedure   - CTA prior   - AF RFA with Carto; he notes that his apple watch has been giving him 'SVT warnings' in addition to his AF.    - Watchman procedure 2-3 months after his AF RFA  - continue apixaban and metoprolol        2. Other ulcerative colitis with rectal bleeding    3. Coronary artery disease involving native coronary artery of native heart with angina pectoris  Assessment & Plan:  Appears to be stable  Continue plavix/metoprolol/lipitor      Other orders  -     ECG 12 Lead      Body mass index is 29.05 kg/m².    ACP discussion was declined by the patient. Patient has an advance directive (not in EMR), copy requested.    Follow Up  No follow-ups on file.    Thank you for allowing me to participate in the care of your patient. Please to not hesitate to contact me with additional questions or concerns.        Nilesh Murdock MD  Cardiac Electrophysiologist  [facility] / [facility]

## 2023-12-18 DIAGNOSIS — K51.811 OTHER ULCERATIVE COLITIS WITH RECTAL BLEEDING: ICD-10-CM

## 2023-12-18 DIAGNOSIS — I48.0 PAROXYSMAL ATRIAL FIBRILLATION: Primary | ICD-10-CM

## 2023-12-19 ENCOUNTER — PREP FOR SURGERY (OUTPATIENT)
Dept: OTHER | Facility: HOSPITAL | Age: 78
End: 2023-12-19
Payer: MEDICARE

## 2023-12-19 DIAGNOSIS — I48.0 PAROXYSMAL ATRIAL FIBRILLATION: Primary | ICD-10-CM

## 2023-12-19 RX ORDER — ONDANSETRON 2 MG/ML
4 INJECTION INTRAMUSCULAR; INTRAVENOUS EVERY 6 HOURS PRN
OUTPATIENT
Start: 2023-12-19

## 2023-12-19 RX ORDER — ACETAMINOPHEN 325 MG/1
650 TABLET ORAL EVERY 4 HOURS PRN
OUTPATIENT
Start: 2023-12-19

## 2023-12-19 RX ORDER — NITROGLYCERIN 0.4 MG/1
0.4 TABLET SUBLINGUAL
OUTPATIENT
Start: 2023-12-19

## 2023-12-19 RX ORDER — SODIUM CHLORIDE 9 MG/ML
40 INJECTION, SOLUTION INTRAVENOUS AS NEEDED
OUTPATIENT
Start: 2023-12-19

## 2024-01-05 NOTE — NURSING NOTE
PRE-PVA ASSESSMENT  Keyon Olivas 1945   [address] [place] [address]   [phone]      Referral Source: Anay Headley, LISSET   Information obtained from: [x] Medical record review  [x] Patient report  Scheduled for: PVA on 1/9/24 with Dr. Murdock  Allergies   Allergen Reactions    Isosorbide Dinitrate Other (See Comments)     Weakness, fatigue, h/a    Crestor [Rosuvastatin] Myalgia    Lescol [Fluvastatin Sodium] Myalgia    Lipitor [Atorvastatin] Myalgia    Niacin And Related Rash    Penicillins Unknown - Low Severity       AFib Specific History:  AFIBTYPE: paroxysmal    CHADS-VASc Risk Assessment              3 Total Score    1 Hypertension    2 Age >/= 75        Criteria that do not apply:    CHF    DM    PRIOR STROKE/TIA/THROMBO    Vascular Disease    Age 65-74    Sex: Female            Anticoagulation: Eliquis 5 mg bid and plavix NO MISSED DOSES   Cardioversion x 0  Prior Ablation: No    Is Mr. Olivas aware of his AFib? Yes   Onset: 2023    Exacerbations: none   Frequency: daily   Alleviations: none     Symptoms:   [] Palpitations:    [] Chest Discomfort:    [] Dizziness:    [] Presyncope:    [] Lightheadedness:   [] Syncope:    [x] Fatigue:    [] Other:    [x] Short of Breath:     Last Echo(s):  [x] TTE Date: 6/21/23              EF: 65%               VHD trace AI, mild MR, mild pulm regurg        Past medical History:   [] Diabetes  No                 Hemoglobin A1C   Date Value Ref Range Status   08/31/2022 4.60 (L) 4.80 - 5.60 % Final          [] HYPOthyroidism No   [] HYPERthyroidism No          TSH   Date Value Ref Range Status   08/31/2022 2.080 0.270 - 4.200 uIU/mL Final   01/29/2020 1.600 0.270 - 4.200 uIU/mL Final     [x] HTN        [x] Tx diovan     [] Heart Failure   No  [] CVA   No                             [] TIA  No        [] Ischemic         [] Hemorrhagic         [] Nonischemic         [] Embolic        [] Diastolic    [x] CAD         [] MI   No          [x] Dyslipidemia  [x] Statin  indicated on lipitor     [x] Ischemic Evaluation   Coronary artery bypass grafting x3, 5/2000        [x] Heart Cath: Repeat catheterization, 3/2023, with stenting of the left main and of the first OM.  Anatomy was stable to that mentioned above.    [] Sleep Apnea Suspected Denies     [] Obesity No BMI 29.05      Other Pertinent PMH: Nonobstructive carotid artery ds    Summary of Patient Contact:    I spoke with Mr. Olivas about his upcoming PVA.   He was well informed about the procedure from prior discussion with Dr. Murdock and from reading the provided literature.  We discussed the procedure at length including risks, anesthesia, intra-op procedures, recovery, bedrest, sheath removal, discharge criteria, normal post-procedure expectations, and success rates.  I answered a few remaining questions. Mr. Olivas verbalized understanding and he is ready to proceed.       Lea Caballero RN

## 2024-01-08 ENCOUNTER — ANESTHESIA EVENT (OUTPATIENT)
Dept: CARDIOLOGY | Facility: HOSPITAL | Age: 79
End: 2024-01-08
Payer: MEDICARE

## 2024-01-08 ENCOUNTER — PRE-ADMISSION TESTING (OUTPATIENT)
Dept: PREADMISSION TESTING | Facility: HOSPITAL | Age: 79
End: 2024-01-08
Payer: MEDICARE

## 2024-01-08 ENCOUNTER — HOSPITAL ENCOUNTER (OUTPATIENT)
Dept: CT IMAGING | Facility: HOSPITAL | Age: 79
Discharge: HOME | End: 2024-01-08
Payer: MEDICARE

## 2024-01-08 DIAGNOSIS — I48.0 PAROXYSMAL ATRIAL FIBRILLATION: Primary | ICD-10-CM

## 2024-01-08 DIAGNOSIS — I48.0 PAROXYSMAL ATRIAL FIBRILLATION: ICD-10-CM

## 2024-01-08 LAB
ANION GAP SERPL CALCULATED.3IONS-SCNC: 11 MMOL/L (ref 5–15)
BUN SERPL-MCNC: 19 MG/DL (ref 8–23)
BUN/CREAT SERPL: 13.6 (ref 7–25)
CALCIUM SPEC-SCNC: 9.6 MG/DL (ref 8.6–10.5)
CHLORIDE SERPL-SCNC: 106 MMOL/L (ref 98–107)
CO2 SERPL-SCNC: 24 MMOL/L (ref 22–29)
CREAT SERPL-MCNC: 1.4 MG/DL (ref 0.76–1.27)
DEPRECATED RDW RBC AUTO: 42.5 FL (ref 37–54)
EGFRCR SERPLBLD CKD-EPI 2021: 52.1 ML/MIN/1.73
ERYTHROCYTE [DISTWIDTH] IN BLOOD BY AUTOMATED COUNT: 13.1 % (ref 12.3–15.4)
GLUCOSE SERPL-MCNC: 107 MG/DL (ref 65–99)
HCT VFR BLD AUTO: 38.1 % (ref 37.5–51)
HGB BLD-MCNC: 12.5 G/DL (ref 13–17.7)
MCH RBC QN AUTO: 29.1 PG (ref 26.6–33)
MCHC RBC AUTO-ENTMCNC: 32.8 G/DL (ref 31.5–35.7)
MCV RBC AUTO: 88.8 FL (ref 79–97)
PLATELET # BLD AUTO: 147 10*3/MM3 (ref 140–450)
PMV BLD AUTO: 9.3 FL (ref 6–12)
POTASSIUM SERPL-SCNC: 4.2 MMOL/L (ref 3.5–5.2)
RBC # BLD AUTO: 4.29 10*6/MM3 (ref 4.14–5.8)
SODIUM SERPL-SCNC: 141 MMOL/L (ref 136–145)
TSH SERPL DL<=0.05 MIU/L-ACNC: 2.91 UIU/ML (ref 0.27–4.2)
WBC NRBC COR # BLD AUTO: 6.07 10*3/MM3 (ref 3.4–10.8)

## 2024-01-08 PROCEDURE — 85027 COMPLETE CBC AUTOMATED: CPT

## 2024-01-08 PROCEDURE — 71275 CT ANGIOGRAPHY CHEST: CPT

## 2024-01-08 PROCEDURE — 84443 ASSAY THYROID STIM HORMONE: CPT

## 2024-01-08 PROCEDURE — 80048 BASIC METABOLIC PNL TOTAL CA: CPT

## 2024-01-08 PROCEDURE — 36415 COLL VENOUS BLD VENIPUNCTURE: CPT

## 2024-01-08 PROCEDURE — 25510000001 IOPAMIDOL PER 1 ML: Performed by: INTERNAL MEDICINE

## 2024-01-08 RX ORDER — FENTANYL CITRATE 50 UG/ML
50 INJECTION, SOLUTION INTRAMUSCULAR; INTRAVENOUS
Status: CANCELLED | OUTPATIENT
Start: 2024-01-08

## 2024-01-08 RX ORDER — HYDROMORPHONE HYDROCHLORIDE 1 MG/ML
0.5 INJECTION, SOLUTION INTRAMUSCULAR; INTRAVENOUS; SUBCUTANEOUS
Status: CANCELLED | OUTPATIENT
Start: 2024-01-08

## 2024-01-08 RX ADMIN — IOPAMIDOL 80 ML: 755 INJECTION, SOLUTION INTRAVENOUS at 09:44

## 2024-01-08 NOTE — PAT
An arrival time for procedure was not provided during PAT visit. If patient had any questions or concerns about their arrival time, they were instructed to contact their surgeon/physician.  Additionally, if the patient referred to an arrival time that was acquired from their my chart account, patient was encouraged to verify that time with their surgeon/physician. Arrival times are NOT provided in Pre Admission Testing Department.    Patient denies any current skin issues.     Patient directed to Radiology Department for CT after Pre Admission Testing Appointment.

## 2024-01-08 NOTE — ANESTHESIA PREPROCEDURE EVALUATION
Anesthesia Evaluation     Patient summary reviewed and Nursing notes reviewed   no history of anesthetic complications:   NPO Solid Status: > 8 hours  NPO Liquid Status: > 2 hours           Airway   Mallampati: III  TM distance: >3 FB  Neck ROM: full  Possible difficult intubation  Dental    (+) partials    Pulmonary - normal exam   (+) a smoker Former,sleep apnea  Cardiovascular   Exercise tolerance: good (4-7 METS)    ECG reviewed  PT is on anticoagulation therapy  Patient on routine beta blocker and Beta blocker given within 24 hours of surgery  Rhythm: regular  Rate: normal    (+) hypertension, CAD, CABG (2000; LIMA-LAD, L RAD-RCA, L RAD from NAYAN to OM2), cardiac stents (8/2006- RCA, OM2 ; angioplasty 8/22 OM, 3/23-LMCA OM1 stents) , dysrhythmias Paroxysmal Atrial Fib, angina, hyperlipidemia,  carotid artery disease    ROS comment: 8/22-· Left ventricular systolic function is normal. Estimated left ventricular EF = 50%.  · Left ventricular wall thickness is consistent with mild concentric hypertrophy.  · The aortic valve is calcified with trace to mild aortic insufficiency no significant stenosis.  · Estimated right ventricular systolic pressure from tricuspid regurgitation is normal (<35 mmHg).   Mild MR, mild TR.      Neuro/Psych  (+) numbness, psychiatric history Anxiety and Depression  (-) seizures, CVA  GI/Hepatic/Renal/Endo    (+) obesity, GI bleeding resolved, hepatitis (r/t blood transfusion) C, liver disease  (-) GERD, diabetes, no thyroid disorder    Musculoskeletal     Abdominal    Substance History - negative use     OB/GYN          Other   autoimmune disease ,     ROS/Med Hx Other: Hgb 12.5 k 4.2 cr 1.4  Eliquis , plavix - did not stop  Ulcerative colitis s/p ileostomy  Denies angina since prior to last PCI 03/2023                Anesthesia Plan    ASA 3     general     (Risks and benefits of general anesthesia discussed with patient (including MI, CVA, death, recall, aspiration,  oropharyngeal/dental damage), questions answered, agreeable to proceed.    Clearsite HD monitoring)  intravenous induction     Anesthetic plan, risks, benefits, and alternatives have been provided, discussed and informed consent has been obtained with: patient.    Use of blood products discussed with patient  Consented to blood products.    Plan discussed with CRNA.    CODE STATUS:

## 2024-01-09 ENCOUNTER — ANESTHESIA (OUTPATIENT)
Dept: CARDIOLOGY | Facility: HOSPITAL | Age: 79
End: 2024-01-09
Payer: MEDICARE

## 2024-01-09 ENCOUNTER — HOSPITAL ENCOUNTER (OUTPATIENT)
Facility: HOSPITAL | Age: 79
Discharge: HOME | End: 2024-01-09
Attending: INTERNAL MEDICINE | Admitting: INTERNAL MEDICINE
Payer: MEDICARE

## 2024-01-09 VITALS
WEIGHT: 190.8 LBS | DIASTOLIC BLOOD PRESSURE: 76 MMHG | BODY MASS INDEX: 30.67 KG/M2 | HEIGHT: 66 IN | TEMPERATURE: 97.8 F | HEART RATE: 80 BPM | OXYGEN SATURATION: 98 % | SYSTOLIC BLOOD PRESSURE: 154 MMHG | RESPIRATION RATE: 11 BRPM

## 2024-01-09 DIAGNOSIS — I48.91 ATRIAL FIBRILLATION WITH RVR: ICD-10-CM

## 2024-01-09 DIAGNOSIS — I48.0 PAROXYSMAL ATRIAL FIBRILLATION: ICD-10-CM

## 2024-01-09 LAB
ACT BLD: 352 SECONDS (ref 82–152)
ACT BLD: 379 SECONDS (ref 82–152)
ACT BLD: 493 SECONDS (ref 82–152)

## 2024-01-09 PROCEDURE — 63710000001 ACETAMINOPHEN 325 MG TABLET: Performed by: INTERNAL MEDICINE

## 2024-01-09 PROCEDURE — 25010000002 PROPOFOL 10 MG/ML EMULSION: Performed by: NURSE ANESTHETIST, CERTIFIED REGISTERED

## 2024-01-09 PROCEDURE — 25810000003 SODIUM CHLORIDE 0.9 % SOLUTION 250 ML FLEX CONT: Performed by: NURSE ANESTHETIST, CERTIFIED REGISTERED

## 2024-01-09 PROCEDURE — 25010000002 PROTAMINE SULFATE PER 10 MG: Performed by: INTERNAL MEDICINE

## 2024-01-09 PROCEDURE — 25010000002 PHENYLEPHRINE 10 MG/ML SOLUTION 1 ML VIAL: Performed by: NURSE ANESTHETIST, CERTIFIED REGISTERED

## 2024-01-09 PROCEDURE — 25010000002 LIDOCAINE 1 % SOLUTION: Performed by: INTERNAL MEDICINE

## 2024-01-09 PROCEDURE — C1893 INTRO/SHEATH, FIXED,NON-PEEL: HCPCS | Performed by: INTERNAL MEDICINE

## 2024-01-09 PROCEDURE — 93656 COMPRE EP EVAL ABLTJ ATR FIB: CPT | Performed by: INTERNAL MEDICINE

## 2024-01-09 PROCEDURE — 25010000002 HEPARIN (PORCINE) PER 1000 UNITS: Performed by: INTERNAL MEDICINE

## 2024-01-09 PROCEDURE — 85347 COAGULATION TIME ACTIVATED: CPT

## 2024-01-09 PROCEDURE — G0378 HOSPITAL OBSERVATION PER HR: HCPCS

## 2024-01-09 PROCEDURE — 93655 ICAR CATH ABLTJ DSCRT ARRHYT: CPT | Performed by: INTERNAL MEDICINE

## 2024-01-09 PROCEDURE — 25010000002 BUPIVACAINE 0.5 % SOLUTION: Performed by: INTERNAL MEDICINE

## 2024-01-09 PROCEDURE — C1732 CATH, EP, DIAG/ABL, 3D/VECT: HCPCS | Performed by: INTERNAL MEDICINE

## 2024-01-09 PROCEDURE — C1760 CLOSURE DEV, VASC: HCPCS | Performed by: INTERNAL MEDICINE

## 2024-01-09 PROCEDURE — 25810000003 SODIUM CHLORIDE 0.9 % SOLUTION: Performed by: INTERNAL MEDICINE

## 2024-01-09 PROCEDURE — C1759 CATH, INTRA ECHOCARDIOGRAPHY: HCPCS | Performed by: INTERNAL MEDICINE

## 2024-01-09 PROCEDURE — A9270 NON-COVERED ITEM OR SERVICE: HCPCS | Performed by: INTERNAL MEDICINE

## 2024-01-09 PROCEDURE — C1730 CATH, EP, 19 OR FEW ELECT: HCPCS | Performed by: INTERNAL MEDICINE

## 2024-01-09 PROCEDURE — 25010000002 DEXAMETHASONE PER 1 MG: Performed by: NURSE ANESTHETIST, CERTIFIED REGISTERED

## 2024-01-09 PROCEDURE — C1766 INTRO/SHEATH,STRBLE,NON-PEEL: HCPCS | Performed by: INTERNAL MEDICINE

## 2024-01-09 PROCEDURE — 25010000002 SUGAMMADEX 200 MG/2ML SOLUTION: Performed by: NURSE ANESTHETIST, CERTIFIED REGISTERED

## 2024-01-09 PROCEDURE — 25010000002 ONDANSETRON PER 1 MG: Performed by: NURSE ANESTHETIST, CERTIFIED REGISTERED

## 2024-01-09 PROCEDURE — 93623 PRGRMD STIMJ&PACG IV RX NFS: CPT | Performed by: INTERNAL MEDICINE

## 2024-01-09 PROCEDURE — C1894 INTRO/SHEATH, NON-LASER: HCPCS | Performed by: INTERNAL MEDICINE

## 2024-01-09 PROCEDURE — 25810000003 SODIUM CHLORIDE 0.9 % SOLUTION: Performed by: NURSE ANESTHETIST, CERTIFIED REGISTERED

## 2024-01-09 PROCEDURE — 93622 COMP EP EVAL L VENTR PAC&REC: CPT | Performed by: INTERNAL MEDICINE

## 2024-01-09 RX ORDER — LIDOCAINE HYDROCHLORIDE 10 MG/ML
INJECTION, SOLUTION EPIDURAL; INFILTRATION; INTRACAUDAL; PERINEURAL AS NEEDED
Status: DISCONTINUED | OUTPATIENT
Start: 2024-01-09 | End: 2024-01-09 | Stop reason: SURG

## 2024-01-09 RX ORDER — SODIUM CHLORIDE 9 MG/ML
500 INJECTION, SOLUTION INTRAVENOUS ONCE
Status: COMPLETED | OUTPATIENT
Start: 2024-01-09 | End: 2024-01-09

## 2024-01-09 RX ORDER — LIDOCAINE HYDROCHLORIDE 10 MG/ML
0.5 INJECTION, SOLUTION EPIDURAL; INFILTRATION; INTRACAUDAL; PERINEURAL ONCE AS NEEDED
Status: DISCONTINUED | OUTPATIENT
Start: 2024-01-09 | End: 2024-01-09 | Stop reason: HOSPADM

## 2024-01-09 RX ORDER — ROCURONIUM BROMIDE 10 MG/ML
INJECTION, SOLUTION INTRAVENOUS AS NEEDED
Status: DISCONTINUED | OUTPATIENT
Start: 2024-01-09 | End: 2024-01-09 | Stop reason: SURG

## 2024-01-09 RX ORDER — ACETAMINOPHEN 325 MG/1
650 TABLET ORAL EVERY 4 HOURS PRN
Status: DISCONTINUED | OUTPATIENT
Start: 2024-01-09 | End: 2024-01-09 | Stop reason: HOSPADM

## 2024-01-09 RX ORDER — PROPOFOL 10 MG/ML
VIAL (ML) INTRAVENOUS AS NEEDED
Status: DISCONTINUED | OUTPATIENT
Start: 2024-01-09 | End: 2024-01-09 | Stop reason: SURG

## 2024-01-09 RX ORDER — BUPIVACAINE HYDROCHLORIDE 5 MG/ML
INJECTION, SOLUTION PERINEURAL
Status: DISCONTINUED | OUTPATIENT
Start: 2024-01-09 | End: 2024-01-09 | Stop reason: HOSPADM

## 2024-01-09 RX ORDER — SODIUM CHLORIDE 9 MG/ML
INJECTION, SOLUTION INTRAVENOUS
Status: COMPLETED | OUTPATIENT
Start: 2024-01-09 | End: 2024-01-09

## 2024-01-09 RX ORDER — ONDANSETRON 2 MG/ML
4 INJECTION INTRAMUSCULAR; INTRAVENOUS EVERY 6 HOURS PRN
Status: DISCONTINUED | OUTPATIENT
Start: 2024-01-09 | End: 2024-01-09 | Stop reason: HOSPADM

## 2024-01-09 RX ORDER — LIDOCAINE HYDROCHLORIDE 10 MG/ML
INJECTION, SOLUTION INFILTRATION; PERINEURAL
Status: DISCONTINUED | OUTPATIENT
Start: 2024-01-09 | End: 2024-01-09 | Stop reason: HOSPADM

## 2024-01-09 RX ORDER — SODIUM CHLORIDE 9 MG/ML
INJECTION, SOLUTION INTRAVENOUS CONTINUOUS PRN
Status: DISCONTINUED | OUTPATIENT
Start: 2024-01-09 | End: 2024-01-09 | Stop reason: SURG

## 2024-01-09 RX ORDER — MIDAZOLAM HYDROCHLORIDE 1 MG/ML
0.5 INJECTION INTRAMUSCULAR; INTRAVENOUS
Status: DISCONTINUED | OUTPATIENT
Start: 2024-01-09 | End: 2024-01-09 | Stop reason: HOSPADM

## 2024-01-09 RX ORDER — SODIUM CHLORIDE 9 MG/ML
40 INJECTION, SOLUTION INTRAVENOUS AS NEEDED
Status: DISCONTINUED | OUTPATIENT
Start: 2024-01-09 | End: 2024-01-09 | Stop reason: HOSPADM

## 2024-01-09 RX ORDER — SODIUM CHLORIDE 0.9 % (FLUSH) 0.9 %
10 SYRINGE (ML) INJECTION AS NEEDED
Status: DISCONTINUED | OUTPATIENT
Start: 2024-01-09 | End: 2024-01-09 | Stop reason: HOSPADM

## 2024-01-09 RX ORDER — FAMOTIDINE 10 MG/ML
20 INJECTION, SOLUTION INTRAVENOUS ONCE
Status: DISCONTINUED | OUTPATIENT
Start: 2024-01-09 | End: 2024-01-09 | Stop reason: HOSPADM

## 2024-01-09 RX ORDER — HEPARIN SODIUM 1000 [USP'U]/ML
INJECTION, SOLUTION INTRAVENOUS; SUBCUTANEOUS
Status: DISCONTINUED | OUTPATIENT
Start: 2024-01-09 | End: 2024-01-09 | Stop reason: HOSPADM

## 2024-01-09 RX ORDER — PROTAMINE SULFATE 10 MG/ML
INJECTION, SOLUTION INTRAVENOUS
Status: DISCONTINUED | OUTPATIENT
Start: 2024-01-09 | End: 2024-01-09 | Stop reason: HOSPADM

## 2024-01-09 RX ORDER — ACETAMINOPHEN 325 MG/1
650 TABLET ORAL EVERY 6 HOURS PRN
Status: DISCONTINUED | OUTPATIENT
Start: 2024-01-09 | End: 2024-01-09 | Stop reason: HOSPADM

## 2024-01-09 RX ORDER — DEXAMETHASONE SODIUM PHOSPHATE 4 MG/ML
INJECTION, SOLUTION INTRA-ARTICULAR; INTRALESIONAL; INTRAMUSCULAR; INTRAVENOUS; SOFT TISSUE AS NEEDED
Status: DISCONTINUED | OUTPATIENT
Start: 2024-01-09 | End: 2024-01-09 | Stop reason: SURG

## 2024-01-09 RX ORDER — SODIUM CHLORIDE 0.9 % (FLUSH) 0.9 %
10 SYRINGE (ML) INJECTION EVERY 12 HOURS SCHEDULED
Status: DISCONTINUED | OUTPATIENT
Start: 2024-01-09 | End: 2024-01-09 | Stop reason: HOSPADM

## 2024-01-09 RX ORDER — NITROGLYCERIN 0.4 MG/1
0.4 TABLET SUBLINGUAL
Status: DISCONTINUED | OUTPATIENT
Start: 2024-01-09 | End: 2024-01-09 | Stop reason: HOSPADM

## 2024-01-09 RX ORDER — ONDANSETRON 2 MG/ML
INJECTION INTRAMUSCULAR; INTRAVENOUS AS NEEDED
Status: DISCONTINUED | OUTPATIENT
Start: 2024-01-09 | End: 2024-01-09 | Stop reason: SURG

## 2024-01-09 RX ORDER — SODIUM CHLORIDE, SODIUM LACTATE, POTASSIUM CHLORIDE, CALCIUM CHLORIDE 600; 310; 30; 20 MG/100ML; MG/100ML; MG/100ML; MG/100ML
9 INJECTION, SOLUTION INTRAVENOUS CONTINUOUS
Status: DISCONTINUED | OUTPATIENT
Start: 2024-01-09 | End: 2024-01-09 | Stop reason: HOSPADM

## 2024-01-09 RX ORDER — FAMOTIDINE 20 MG/1
20 TABLET, FILM COATED ORAL ONCE
Status: COMPLETED | OUTPATIENT
Start: 2024-01-09 | End: 2024-01-09

## 2024-01-09 RX ADMIN — ROCURONIUM BROMIDE 10 MG: 10 SOLUTION INTRAVENOUS at 10:12

## 2024-01-09 RX ADMIN — PROPOFOL 150 MG: 10 INJECTION, EMULSION INTRAVENOUS at 08:41

## 2024-01-09 RX ADMIN — ONDANSETRON 4 MG: 2 INJECTION INTRAMUSCULAR; INTRAVENOUS at 10:48

## 2024-01-09 RX ADMIN — PHENYLEPHRINE HYDROCHLORIDE 0.5 MCG/KG/MIN: 10 INJECTION INTRAVENOUS at 08:41

## 2024-01-09 RX ADMIN — SODIUM CHLORIDE 500 ML/HR: 9 INJECTION, SOLUTION INTRAVENOUS at 07:20

## 2024-01-09 RX ADMIN — LIDOCAINE HYDROCHLORIDE 50 MG: 10 INJECTION, SOLUTION EPIDURAL; INFILTRATION; INTRACAUDAL; PERINEURAL at 08:41

## 2024-01-09 RX ADMIN — DEXAMETHASONE SODIUM PHOSPHATE 4 MG: 4 INJECTION, SOLUTION INTRAMUSCULAR; INTRAVENOUS at 08:45

## 2024-01-09 RX ADMIN — FAMOTIDINE 20 MG: 20 TABLET, FILM COATED ORAL at 07:03

## 2024-01-09 RX ADMIN — ACETAMINOPHEN 650 MG: 325 TABLET ORAL at 14:17

## 2024-01-09 RX ADMIN — ROCURONIUM BROMIDE 50 MG: 10 SOLUTION INTRAVENOUS at 08:41

## 2024-01-09 RX ADMIN — SODIUM CHLORIDE: 9 INJECTION, SOLUTION INTRAVENOUS at 08:25

## 2024-01-09 RX ADMIN — ROCURONIUM BROMIDE 20 MG: 10 SOLUTION INTRAVENOUS at 09:15

## 2024-01-09 RX ADMIN — SUGAMMADEX 200 MG: 100 INJECTION, SOLUTION INTRAVENOUS at 10:48

## 2024-01-09 NOTE — H&P
[facility] at [facility]  Cardiovascular Consultation/h&p Note      Keyon Olivas  [id number]  1945  2502/1    Referring Provider: Nilesh Murdock MD   PCP: Osman Olivas MD    DATE OF ADMISSION: 1/9/2024    Problem list  1.  Coronary artery disease.  1.1.  Coronary artery bypass grafting x3, 5/2000.  By report, the patient was grafted with LIMA to LAD, left radial artery to the RCA, and left radial artery as a T graft from  internal mammary artery to the OM 2.  1.2.  Reported stenting to the RCA and OM 2, 8/2006.  1.3.  Repeat catheterization, 8/2022.  The patient demonstrated patent LIMA to LAD but with diffuse disease distally.  The free radial artery to the RCA was patent but felt to be very small.  No grafting of the OM was noted.  The patient had diffuse disease to the distal LAD, RCA, and circumflex.  Eventually, he did have angioplasty only to the OM.  1.4.  Repeat catheterization, 3/2023, with stenting of the left main and of the first OM.  Anatomy was stable to that mentioned above.  2.  Low normal, but preserved systolic function, estimated EF at 50% by recent echo.  3.  Hypertension  4.  Dyslipidemia.  5.  Ulcerative colitis, apparently with ileostomy, remote.  6.  Nonobstructive carotid artery disease per duplex, 8/2022.  7.PAF, brief episode of RVR per 8- [facility] admit. Never started on anti-coag.   8.  Alert received on recent event monitor indicating episode of paroxysmal atrial fibrillation.  Reviewed by Dr. Samaniego and patient started on Eliquis for long-term anticoagulation.    History of Present Illness  77 yo man with pmhx of CAD, HFpEF, ANNA MARIE on CKD presenting here for AF ablation. Has been experience sypmtomatic AF episodes which appear to have been paroxysmal in nature. Patient also has history of GIB 2/2 PUD and being planned on for LAAO implantation procedure in the future.     Past Medical History:   Diagnosis Date    ANNA MARIE (acute kidney injury) 08/31/2022     Anxiety and depression 03/09/2017    Atrial fibrillation 08/31/2022    Bilateral carotid artery stenosis 08/23/2022    BPH (benign prostatic hyperplasia) 03/09/2017    CAD (coronary artery disease) 03/09/2017    COVID-19     02/2022    COVID-19 vaccine administered     02/26/2021, 03/26/2021, 03/10/2022 - Moderna    DJD (degenerative joint disease) 03/09/2017    Dyslipidemia 03/09/2017    Elevated cholesterol     Hepatitis C     History of hepatitis C; followed by Dr. Cash in [place].    History of transfusion 1976    [facility], NO REACTION; PT STATED HE CONTRACTED HEP C    Hyperlipidemia     Hypertension 03/09/2017    Insomnia 03/09/2017    Neuropathy     DEACON (obstructive sleep apnea) 03/09/2017    NOT USING CPAP    Poor short term memory 03/09/2017    Small fiber neuropathy     Ulcerative colitis 03/09/2017       Past Surgical History:   Procedure Laterality Date    CARDIAC CATHETERIZATION  05/2000    CARDIAC CATHETERIZATION  2023    PCI    COLECTOMY TOTAL  1971    Ulcerative colitis, status post total colectomy with ileostomy.     COLONOSCOPY      CORONARY ARTERY BYPASS GRAFT  05/11/2000    x3    CORONARY STENT PLACEMENT  08/2006    ENDOSCOPY      MUSCLE FLAP      ON BOTTOM    PILONIDAL CYSTECTOMY         No current facility-administered medications on file prior to encounter.     Current Outpatient Medications on File Prior to Encounter   Medication Sig Dispense Refill    apixaban (ELIQUIS) 5 MG tablet tablet Take 1 tablet by mouth 2 (Two) Times a Day. (Patient taking differently: Take 1 tablet by mouth 2 (Two) Times a Day. PT TO HOLD MORNING OF PROCEDURE PER DR. CASTELLANOS) 180 tablet 3    atorvastatin (LIPITOR) 80 MG tablet Take 1 tablet by mouth Every Night.      Cholecalciferol 25 MCG (1000 UT) tablet Take 1 tablet by mouth Daily.      clopidogrel (PLAVIX) 75 MG tablet Take 1 tablet by mouth Daily. 90 tablet 3    ezetimibe (ZETIA) 10 MG tablet Take 1 tablet by mouth Daily. 30 tablet 11    loperamide (IMODIUM)  2 MG capsule Take 1 capsule by mouth 2 (Two) Times a Day. (Patient taking differently: Take 1 capsule by mouth 4 (Four) Times a Day As Needed for Diarrhea.) 30 capsule     metoprolol tartrate (LOPRESSOR) 25 MG tablet Take 1 tablet by mouth 2 (Two) Times a Day. 180 tablet 3    tamsulosin (FLOMAX) 0.4 MG capsule 24 hr capsule Take 1 capsule by mouth Every Night.      traMADol (ULTRAM) 50 MG tablet Take 1 tablet by mouth Every 6 (Six) Hours As Needed for Moderate Pain. prn      valsartan (DIOVAN) 40 MG tablet Take 0.5 tablets by mouth Daily. 45 tablet 3    venlafaxine (EFFEXOR) 75 MG tablet Take 1 tablet by mouth 2 (Two) Times a Day.      vitamin B-12 (CYANOCOBALAMIN) 1000 MCG tablet Take 1 tablet by mouth Daily.      zolpidem (AMBIEN) 10 MG tablet Take 1 tablet by mouth At Night As Needed.      nitroglycerin (NITROSTAT) 0.4 MG SL tablet Place 1 tablet under the tongue Every 5 (Five) Minutes As Needed for Chest Pain. Take no more than 3 doses in 15 minutes. 35 tablet 5       Social History     Socioeconomic History    Marital status:    Tobacco Use    Smoking status: Former     Packs/day: 1.00     Years: 15.00     Additional pack years: 0.00     Total pack years: 15.00     Types: Cigarettes     Start date: 1965     Quit date: 1983     Years since quittin.0    Smokeless tobacco: Former     Types: Chew    Tobacco comments:     smoked 10-20 years, < last 1 PPD   Vaping Use    Vaping Use: Never used   Substance and Sexual Activity    Alcohol use: No    Drug use: No    Sexual activity: Defer       Family History   Problem Relation Age of Onset    Emphysema Mother     Heart failure Mother     Heart attack Father     Memory loss Brother        Review of Systems      Physical Exam  Vitals:    24 0629 24 0634 24 0636   BP:  133/64 135/69   BP Location:  Right arm Left arm   Patient Position:  Lying Lying   Pulse:  66    Resp:  18    Temp:  97.4 °F (36.3 °C)    TempSrc:  Temporal    SpO2:   "100%    Weight: 86.5 kg (190 lb 12.8 oz)     Height: 167.6 cm (66\")       GENERAL: Well-developed, well-nourished patient in no acute distress.  HEENT: NC, AC, PERRLA. MMM  NECK: No JVD. No carotid bruits auscultated.  LUNGS: Clear to auscultation bilaterally.  CARDIOVASCULAR: RRR No murmurs, gallops or rubs noted.   ABDOMEN: Soft, nontender. Positive bowel sounds.  MUSCULOSKELETAL: No gross deformities. No clubbing, cyanosis  EXT: pulses intact, No edema  SKIN: Pink, warm  Neuro: Nonfocal exam. Gait intact    Lab Review:            Results from last 7 days   Lab Units 01/08/24  0837   SODIUM mmol/L 141   POTASSIUM mmol/L 4.2   CHLORIDE mmol/L 106   CO2 mmol/L 24.0   BUN mg/dL 19   CREATININE mg/dL 1.40*   GLUCOSE mg/dL 107*   CALCIUM mg/dL 9.6         Results from last 7 days   Lab Units 01/08/24  0837   WBC 10*3/mm3 6.07   HEMOGLOBIN g/dL 12.5*   HEMATOCRIT % 38.1   PLATELETS 10*3/mm3 147                     I personally viewed and interpreted the patient's EKG/telemetry/lab/imaging data    Assessment & Plan:  Paroxysmal AF  - proceed with AF ablation  - continue AC after procedure for 2-3 months  - plan for Watchman in about 2-3 months      Thank you for allowing me to participate in the care of Keyon Olivas. Feel free to contact me directly with any further questions or concerns.    Nilesh Murdock MD  Cardiac Electrophysiologist  [facility]     01/09/24  07:45 EST.      "

## 2024-01-09 NOTE — Clinical Note
Hemostasis started on the right femoral vein. Perclose was used in achieving hemostasis. Closure device deployed in the vessel. Hemostasis achieved successfully. Closure device additional comment: AGILIS SHEATH OUT OVER WIRE Dr Samuel Preciado

## 2024-01-09 NOTE — ANESTHESIA POSTPROCEDURE EVALUATION
Patient: Keyon Olivas    Procedure Summary       Date: 01/09/24 Room / Location: [facility] /  [facility]    Anesthesia Start: 0825 Anesthesia Stop: 1054    Procedure: PVA (paroxysmal), Carto, hold Eliquis morning of Diagnosis:       Paroxysmal atrial fibrillation      (af)    Providers: Nilesh Murdock MD Provider: Rina Machado DO    Anesthesia Type: general ASA Status: 3            Anesthesia Type: general    Vitals  No vitals data found for the desired time range.          Post Anesthesia Care and Evaluation    Patient location: cath lab.  Patient participation: complete - patient participated  Level of consciousness: responsive to light touch  Pain management: adequate    Airway patency: patent  Anesthetic complications: No anesthetic complications  PONV Status: none  Cardiovascular status: hemodynamically stable and acceptable  Respiratory status: nonlabored ventilation, acceptable, nasal cannula and oral airway  Hydration status: acceptable    Comments: 99/57 rr14 97.4 66hr 99%

## 2024-01-09 NOTE — Clinical Note
Hemostasis started on the left femoral vein. Perclose was used in achieving hemostasis. Closure device deployed in the vessel. Hemostasis achieved successfully.

## 2024-01-09 NOTE — Clinical Note
Hemostasis started on the right femoral vein. Perclose was used in achieving hemostasis. Closure device deployed in the vessel. Hemostasis achieved successfully. Closure device additional comment: VERSACROSS SHEATH REMOVED OVER WIRE

## 2024-01-09 NOTE — ANESTHESIA PROCEDURE NOTES
Airway  Urgency: elective    Date/Time: 1/9/2024 8:49 AM  Airway not difficult    General Information and Staff    Patient location during procedure: OR  CRNA/CAA: Junior ARUN Fishman, CRNA    Indications and Patient Condition  Indications for airway management: airway protection    Preoxygenated: yes  MILS not maintained throughout  Mask difficulty assessment: 1 - vent by mask    Final Airway Details  Final airway type: endotracheal airway      Successful airway: ETT  Cuffed: yes   Successful intubation technique: direct laryngoscopy  Endotracheal tube insertion site: oral  Blade: Khari  Blade size: 4  ETT size (mm): 7.5  Cormack-Lehane Classification: grade I - full view of glottis  Placement verified by: chest auscultation and capnometry   Measured from: lips  ETT/EBT  to lips (cm): 20  Number of attempts at approach: 1  Assessment: lips, teeth, and gum same as pre-op and atraumatic intubation    Additional Comments  Negative epigastric sounds, Breath sound equal bilaterally with symmetric chest rise and fall

## 2024-01-10 ENCOUNTER — CALL CENTER PROGRAMS (OUTPATIENT)
Dept: CALL CENTER | Facility: HOSPITAL | Age: 79
End: 2024-01-10
Payer: MEDICARE

## 2024-01-10 NOTE — OUTREACH NOTE
PCI/Device Survey      Flowsheet Row Responses   Facility patient discharged from? [facility]   Procedure date 01/09/24   Procedure (if device, specify in description) Ablation   Performing MD Other (annotate)  [Dr. Murdock]   Attempt successful? Yes   Call start time 0951   Call end time 0957   Person spoke with today (if not patient) and relationship pt and son   Is the patient taking prescribed medications: Plavix  [Pt to resume Eliquis 5mg BID today, pt and son v/u]   Does the patient have any of the following symptoms related to the cath/surgical site? --  [bilat groin sites with dressing are without issue, per pt report.]   Nursing intervention Patient education provided   Does the patient have an appointment scheduled with the cardiologist? Yes   If the patient is a current smoker, are they able to teach back resources for cessation? Not a smoker   Did the patient feel prepared to go home on the same day as the procedure? Yes   Is the patient satisfied with the same day discharge process? Yes   PCI/Device call completed Yes            Crissy ALATORRE - Registered Nurse

## 2024-02-19 ENCOUNTER — OFFICE VISIT (OUTPATIENT)
Dept: CARDIOLOGY | Facility: CLINIC | Age: 79
End: 2024-02-19
Payer: MEDICARE

## 2024-02-19 VITALS
BODY MASS INDEX: 31.02 KG/M2 | SYSTOLIC BLOOD PRESSURE: 141 MMHG | WEIGHT: 193 LBS | DIASTOLIC BLOOD PRESSURE: 75 MMHG | HEART RATE: 95 BPM | OXYGEN SATURATION: 98 % | HEIGHT: 66 IN

## 2024-02-19 DIAGNOSIS — I48.0 PAROXYSMAL ATRIAL FIBRILLATION: Primary | ICD-10-CM

## 2024-02-19 DIAGNOSIS — I25.810 CORONARY ARTERY DISEASE INVOLVING CORONARY BYPASS GRAFT OF NATIVE HEART WITHOUT ANGINA PECTORIS: ICD-10-CM

## 2024-02-19 DIAGNOSIS — E78.5 DYSLIPIDEMIA: ICD-10-CM

## 2024-02-19 DIAGNOSIS — I10 PRIMARY HYPERTENSION: ICD-10-CM

## 2024-02-19 PROCEDURE — 99214 OFFICE O/P EST MOD 30 MIN: CPT | Performed by: CLINICAL NURSE SPECIALIST

## 2024-02-19 PROCEDURE — 3077F SYST BP >= 140 MM HG: CPT | Performed by: CLINICAL NURSE SPECIALIST

## 2024-02-19 PROCEDURE — 3078F DIAST BP <80 MM HG: CPT | Performed by: CLINICAL NURSE SPECIALIST

## 2024-02-19 NOTE — PROGRESS NOTES
Subjective     Keyon Olivas is a 78 y.o. male who presents today for Follow-up (3 month blood work f/u ).    CHIEF COMPLIANT  Chief Complaint   Patient presents with    Follow-up     3 month blood work f/u        Active Problems:  1.  Coronary artery disease.  1.1.  Coronary artery bypass grafting x3, 5/2000.  By report, the patient was grafted with LIMA to LAD, left radial artery to the RCA, and left radial artery as a T graft from  internal mammary artery to the OM 2.  1.2.  Reported stenting to the RCA and OM 2, 8/2006.  1.3.  Repeat catheterization, 8/2022.  The patient demonstrated patent LIMA to LAD but with diffuse disease distally.  The free radial artery to the RCA was patent but felt to be very small.  No grafting of the OM was noted.  The patient had diffuse disease to the distal LAD, RCA, and circumflex.  Eventually, he did have angioplasty only to the OM.  1.4.  Repeat catheterization, 3/2023, with stenting of the left main and of the first OM.  Anatomy was stable to that mentioned above.  2.  Low normal, but preserved systolic function, estimated EF at 50% by recent echo.  3.  Hypertension  4.  Dyslipidemia.  5.  Ulcerative colitis, apparently with ileostomy, remote.  6.  Nonobstructive carotid artery disease per duplex, 8/2022.  7.PAF, brief episode of RVR per 8- [facility] admit. Never started on anti-coag.   8.  Alert received on event monitor indicating episode of paroxysmal atrial fibrillation.  Reviewed by Dr. Samaniego and patient started on Eliquis for long-term anticoagulation.    HPI  The patient is a 78-year-old male that returns for follow-up.  He was referred to Dr. Murdock for further evaluation due to PAF and underwent PV ablation on 1/9/2024.  The patient states he has felt better since that time.  He denies palpitations.  He will be undergoing a left atrial appendage closure device in a couple months.  Dr. Murdock wanted to wait around 90 days after the ablation for proceeding.  The patient  denies chest pain, shortness of air, syncope, near syncope.  His only complaint is a ringing in his ear which has been there for several months.  He is following with Dr. Burns ENT.  He will be undergoing a hearing test and then following back up with Dr. Burns for further evaluation and treatment.  He states that this is the only thing that is currently causing him problems.  Heart rate and blood pressure are stable.    PRIOR MEDS  Current Outpatient Medications on File Prior to Visit   Medication Sig Dispense Refill    apixaban (ELIQUIS) 5 MG tablet tablet Take 1 tablet by mouth 2 (Two) Times a Day. 180 tablet 3    atorvastatin (LIPITOR) 80 MG tablet Take 1 tablet by mouth Every Night.      Cholecalciferol 25 MCG (1000 UT) tablet Take 1 tablet by mouth Daily.      clopidogrel (PLAVIX) 75 MG tablet Take 1 tablet by mouth Daily. 90 tablet 3    ezetimibe (ZETIA) 10 MG tablet Take 1 tablet by mouth Daily. 30 tablet 11    loperamide (IMODIUM) 2 MG capsule Take 1 capsule by mouth 2 (Two) Times a Day. (Patient taking differently: Take 1 capsule by mouth As Needed.) 30 capsule     metoprolol tartrate (LOPRESSOR) 25 MG tablet Take 1 tablet by mouth 2 (Two) Times a Day. (Patient taking differently: Take 0.5 tablets by mouth 2 (Two) Times a Day.) 180 tablet 3    nitroglycerin (NITROSTAT) 0.4 MG SL tablet Place 1 tablet under the tongue Every 5 (Five) Minutes As Needed for Chest Pain. Take no more than 3 doses in 15 minutes. 35 tablet 5    tamsulosin (FLOMAX) 0.4 MG capsule 24 hr capsule Take 1 capsule by mouth Every Night.      traMADol (ULTRAM) 50 MG tablet Take 1 tablet by mouth Every 6 (Six) Hours As Needed for Moderate Pain. prn      valsartan (DIOVAN) 40 MG tablet Take 0.5 tablets by mouth Daily. 45 tablet 3    venlafaxine (EFFEXOR) 75 MG tablet Take 1 tablet by mouth 2 (Two) Times a Day.      vitamin B-12 (CYANOCOBALAMIN) 1000 MCG tablet Take 1 tablet by mouth Daily.      zolpidem (AMBIEN) 10 MG tablet Take 1 tablet  by mouth At Night As Needed.       No current facility-administered medications on file prior to visit.       ALLERGIES  Crestor [rosuvastatin], Isosorbide dinitrate, Lescol [fluvastatin sodium], Lipitor [atorvastatin], Niacin and related, and Penicillins    HISTORY  Past Medical History:   Diagnosis Date    ANNA MARIE (acute kidney injury) 2022    Anxiety and depression 2017    Atrial fibrillation 2022    Bilateral carotid artery stenosis 2022    BPH (benign prostatic hyperplasia) 2017    CAD (coronary artery disease) 2017    COVID-19     2022    COVID-19 vaccine administered     2021, 2021, 03/10/2022 - Moderna    DJD (degenerative joint disease) 2017    Dyslipidemia 2017    Elevated cholesterol     Hepatitis C     History of hepatitis C; followed by Dr. Cash in [place].    History of transfusion [address] [place] [address], NO REACTION; PT STATED HE CONTRACTED HEP C    Hyperlipidemia     Hypertension 2017    Insomnia 2017    Neuropathy     DEACON (obstructive sleep apnea) 2017    NOT USING CPAP    Poor short term memory 2017    Small fiber neuropathy     Ulcerative colitis 2017       Social History     Socioeconomic History    Marital status:    Tobacco Use    Smoking status: Former     Packs/day: 1.00     Years: 15.00     Additional pack years: 0.00     Total pack years: 15.00     Types: Cigarettes     Start date: 1965     Quit date: 1983     Years since quittin.1    Smokeless tobacco: Former     Types: Chew    Tobacco comments:     smoked 10-20 years, < last 1 PPD   Vaping Use    Vaping Use: Never used   Substance and Sexual Activity    Alcohol use: No    Drug use: No    Sexual activity: Defer       Family History   Problem Relation Age of Onset    Emphysema Mother     Heart failure Mother     Heart attack Father     Memory loss Brother        Review of Systems   Constitutional:  Positive for fatigue and unexpected  "weight change. Negative for chills, diaphoresis and fever.   HENT:  Positive for tinnitus (pulsitile tinnitus).    Eyes: Negative.  Negative for visual disturbance.   Respiratory:  Positive for shortness of breath (when standing up). Negative for apnea, cough and chest tightness.    Cardiovascular:  Positive for palpitations (with the tinnitus). Negative for chest pain and leg swelling.   Gastrointestinal: Negative.  Negative for blood in stool.   Endocrine: Negative.  Negative for cold intolerance and heat intolerance.   Genitourinary: Negative.  Negative for hematuria.   Musculoskeletal: Negative.  Negative for arthralgias (no more than normal), back pain, myalgias, neck pain and neck stiffness.   Skin: Negative.  Negative for color change, rash and wound.   Allergic/Immunologic: Negative.  Negative for environmental allergies and food allergies.   Neurological:  Positive for tremors (hands). Negative for dizziness, syncope, weakness, light-headedness, numbness and headaches.   Hematological:  Bruises/bleeds easily.   Psychiatric/Behavioral:  Positive for sleep disturbance (insomnia).        Objective     VITALS: /75 (BP Location: Right arm, Patient Position: Sitting)   Pulse 95   Ht 167.6 cm (65.98\")   Wt 87.5 kg (193 lb)   SpO2 98%   BMI 31.17 kg/m²     LABS:   Lab Results (most recent)       None            IMAGING:   CT Angiogram Chest    Result Date: 1/8/2024  1. Cardiac enlargement without pericardial effusion. 2. No anomalous pulmonary venous return. 3. Left atrial appendage well-opacified without significant thrombus burden. 4. Esophagus traverses the chest posterior to the left atrium just left of midline. Electronically Signed: Raul Oliva MD  1/8/2024 12:59 PM EST  Workstation ID: [id number]      EXAM:  Physical Exam  Constitutional:       Appearance: Normal appearance.   Eyes:      Pupils: Pupils are equal, round, and reactive to light.   Cardiovascular:      Rate and Rhythm: Normal rate " and regular rhythm.      Pulses:           Carotid pulses are 2+ on the right side and 2+ on the left side.       Radial pulses are 2+ on the right side and 2+ on the left side.        Dorsalis pedis pulses are 2+ on the right side and 2+ on the left side.        Posterior tibial pulses are 2+ on the right side and 2+ on the left side.      Heart sounds: Normal heart sounds.   Pulmonary:      Effort: Pulmonary effort is normal.      Breath sounds: Normal breath sounds.   Abdominal:      General: Bowel sounds are normal.      Palpations: Abdomen is soft.   Musculoskeletal:      Right lower leg: No edema.      Left lower leg: No edema.   Skin:     General: Skin is warm and dry.      Capillary Refill: Capillary refill takes less than 2 seconds.   Neurological:      General: No focal deficit present.      Mental Status: He is alert and oriented to person, place, and time.   Psychiatric:         Mood and Affect: Mood normal.         Thought Content: Thought content normal.   Procedure   Procedures       Assessment & Plan    Diagnosis Plan   1. Paroxysmal atrial fibrillation        2. Coronary artery disease involving coronary bypass graft of native heart without angina pectoris        3. Dyslipidemia        4. Primary hypertension          Plan:  1.  Paroxysmal atrial fibrillation: The patient underwent PVI ablation with Dr. Murdock on 1/9/2024.  He is doing well after the procedure.  He will be following up with Dr. Murdock soon and will be undergoing left atrial appendage closure device placement.  Will continue Eliquis for now.  2.  CAD: Continue current medical therapy including Lipitor, Plavix, Zetia, metoprolol tartrate, valsartan.  The patient was instructed to monitor BP at home and to notify our office if systolic BP is consistently greater than 130-135 systolic.  Goal BP is 130-135/70-80.  He is currently not on aspirin as he is also taking Eliquis.  3.  Dyslipidemia: Will continue atorvastatin and Zetia.  Will  periodically review lipid panel.  4.  Primary hypertension: Blood pressure is just a little elevated in the office today, however, the patient states it typically runs within a normal range at home.  Will continue current antihypertensives.  The patient was instructed to monitor BP at home and to notify our office if systolic BP is consistently greater than 130-135 systolic.  Goal BP is 130-135/70-80.  Return in about 6 months (around 8/19/2024).    Keyon Olivas  reports that he quit smoking about 41 years ago. His smoking use included cigarettes. He started smoking about 59 years ago. He has a 15.00 pack-year smoking history. He has quit using smokeless tobacco.  His smokeless tobacco use included chew..       Patient did not bring med list or medicine bottles to appointment, med list has been reviewed and updated based on patient's knowledge of their meds.     Advance Care Planning   ACP discussion was held with the patient during this visit. Patient has an advance directive (not in EMR), copy requested.                 MEDS ORDERED DURING VISIT:  No orders of the defined types were placed in this encounter.      DISCONTINUED MEDS DURING VISIT:   There are no discontinued medications.       This document has been electronically signed by LISSET Mcpherson  February 19, 2024 17:33 EST    Dictated Utilizing Dragon Dictation: Part of this note may be an electronic transcription/translation of spoken language to printed text using the Dragon Dictation System

## 2024-03-26 DIAGNOSIS — I48.0 PAROXYSMAL ATRIAL FIBRILLATION: Primary | ICD-10-CM

## 2024-03-27 ENCOUNTER — PREP FOR SURGERY (OUTPATIENT)
Dept: OTHER | Facility: HOSPITAL | Age: 79
End: 2024-03-27
Payer: MEDICARE

## 2024-03-27 DIAGNOSIS — I48.0 PAROXYSMAL ATRIAL FIBRILLATION: Primary | ICD-10-CM

## 2024-03-27 RX ORDER — SODIUM CHLORIDE 0.9 % (FLUSH) 0.9 %
3 SYRINGE (ML) INJECTION EVERY 12 HOURS SCHEDULED
OUTPATIENT
Start: 2024-03-27

## 2024-03-27 RX ORDER — SODIUM CHLORIDE 9 MG/ML
40 INJECTION, SOLUTION INTRAVENOUS AS NEEDED
OUTPATIENT
Start: 2024-03-27

## 2024-03-27 RX ORDER — CEFAZOLIN SODIUM 2 G/100ML
2 INJECTION, SOLUTION INTRAVENOUS ONCE
OUTPATIENT
Start: 2024-03-27 | End: 2024-03-27

## 2024-03-27 RX ORDER — ONDANSETRON 2 MG/ML
4 INJECTION INTRAMUSCULAR; INTRAVENOUS EVERY 6 HOURS PRN
OUTPATIENT
Start: 2024-03-27

## 2024-03-27 RX ORDER — NITROGLYCERIN 0.4 MG/1
0.4 TABLET SUBLINGUAL
OUTPATIENT
Start: 2024-03-27

## 2024-03-27 RX ORDER — SODIUM CHLORIDE 0.9 % (FLUSH) 0.9 %
10 SYRINGE (ML) INJECTION AS NEEDED
OUTPATIENT
Start: 2024-03-27

## 2024-03-27 RX ORDER — ACETAMINOPHEN 325 MG/1
650 TABLET ORAL EVERY 4 HOURS PRN
OUTPATIENT
Start: 2024-03-27

## 2024-04-22 NOTE — NURSING NOTE
PRE-WATCHMAN HISTORY  Keyon Olivas 1945   [address] [facility] [id number] [phone] (home)     Referring MD: LISSET Dior  Information obtained from: [x] Medical record review  [x] Patient report  Scheduled for: Watchman implant on 4/29/24 with Dr Dr. Murdock  [facility] Visit: LISSET Dior    History & Risk Factors (prior to Watchman implant)  [id number] Risk Factors:  Congestive HF     [x] No   [] Yes  LV Dysfunction   [x] No   [] Yes  Hypertension      [] No   [x] Yes  Diabetes    [x] No   [] Yes  Stroke       [x] No   [] Yes  TIA       [x] No   [] Yes  Thromboembolism    [x] No   [] Yes  Vascular Disease   [] No   [x] Yes       If Yes, Type   [] Prior MI  [x] CAD      [x] Aortic Plaque    Statin if indicated          [] No   [x] Yes- Lipitor  HAS-BLED Risk Factors:  HTN (uncontrolled) [x] No  [] Yes  Abnormal Renal Fxn  [] No  [x] Yes- elevated Creatinine 1.40 (1/8/24)  Abnormal Liver Fxn   [x] No  [] Yes  Stroke            [x] No  [] Yes  Bleeding event [] No  [x] Yes  Labile INR      [x] No  [] Yes  Alcohol  [x] No  [] Yes  Antiplatelets      [] No  [x] Yes -Plavix  NSAIDS  [x] No  [] Yes    Additional Stroke & Bleeding Risk Factors:  Increased Fall Risk   [x] No  [] Yes  Bleeding Event         [] No  [x] Yes      If Yes, Type      [] Intracranial [] Epistaxis   [x] GI   [] Other    Rhythm History:  AFIBTYPE: paroxysmal    Valvular AFib        [x] No  [] Yes       If Yes, Rheum Valve Disease []  No  [] Yes       If Yes, Mitral Valve Replacement [] No  [] Yes            If Yes, Mechanical?   [] No  [] Yes       If Yes, Mitral Valve Repair  [] No  [] Yes    Attempt at AFib Termination:  [] No  [x] Yes       If Yes, pharmacologic CV    [x] No  [] Yes       If Yes, DC cardioversion  [x] No  [] Yes       If Yes, catheter ablation  [] No  [x] Yes            If Yes, Date of most recent: _1/9/24_______              If Yes, surgical ablation  [x] No  [] Yes           If Yes, Date of most recent:  ________           Atrial Flutter    [x] No  [] Yes       If Yes, attempt at termination  [] No  [] Yes            If Yes, pharmacologic cardioversion [] No  [] Yes            If Yes, DC cardioversion  [] No  [] Yes            If Yes, catheter ablation  [] No  [] Yes            If Yes, Date of most recent: ________           JASMINA Intervention    [x] No  [] Yes    Additional History & Risk Factors:  Cardiomyopathy   [x] No  [] Yes  Chronic Lung Disease  [x] No  [] Yes  Coronary Artery Disease  [] No  [x] Yes  Sleep Apnea    [x] No  [] Yes        Epicardial Approach Considered [x] No  [] Yes    Diagnostic Studies:  Last Echo:  [x] TTE Date: 8/31/22                 EF: 50%             LA: 2.8 cm            VHD? Mild mitral valve regurgitation is present.        Ace, arb arni for EF < 40%       Pre-procedure blood thinner medications:    Hold Eliquis the morning of the procedure. Dr. Murdock will prescribe post op medications at the time of surgery.   He was well informed about the procedure from prior discussion with Dr. Murdock and from reading the provided literature.  We discussed the procedure at length including anesthesia, intra-op procedures, recovery, bedrest,and normal post-procedure expectations.  I answered a few remaining questions. Mr. Olivas verbalized understanding and he is ready to proceed.

## 2024-04-26 ENCOUNTER — PRE-ADMISSION TESTING (OUTPATIENT)
Dept: PREADMISSION TESTING | Facility: HOSPITAL | Age: 79
DRG: 274 | End: 2024-04-26
Payer: MEDICARE

## 2024-04-26 DIAGNOSIS — I48.0 PAROXYSMAL ATRIAL FIBRILLATION: ICD-10-CM

## 2024-04-26 LAB
ANION GAP SERPL CALCULATED.3IONS-SCNC: 9 MMOL/L (ref 5–15)
BUN SERPL-MCNC: 20 MG/DL (ref 8–23)
BUN/CREAT SERPL: 13.6 (ref 7–25)
CALCIUM SPEC-SCNC: 9.4 MG/DL (ref 8.6–10.5)
CHLORIDE SERPL-SCNC: 106 MMOL/L (ref 98–107)
CO2 SERPL-SCNC: 26 MMOL/L (ref 22–29)
CREAT SERPL-MCNC: 1.47 MG/DL (ref 0.76–1.27)
DEPRECATED RDW RBC AUTO: 41.4 FL (ref 37–54)
EGFRCR SERPLBLD CKD-EPI 2021: 49.1 ML/MIN/1.73
ERYTHROCYTE [DISTWIDTH] IN BLOOD BY AUTOMATED COUNT: 13.3 % (ref 12.3–15.4)
GLUCOSE SERPL-MCNC: 64 MG/DL (ref 65–99)
HCT VFR BLD AUTO: 37.3 % (ref 37.5–51)
HGB BLD-MCNC: 12.1 G/DL (ref 13–17.7)
MCH RBC QN AUTO: 28 PG (ref 26.6–33)
MCHC RBC AUTO-ENTMCNC: 32.4 G/DL (ref 31.5–35.7)
MCV RBC AUTO: 86.3 FL (ref 79–97)
PLATELET # BLD AUTO: 151 10*3/MM3 (ref 140–450)
PMV BLD AUTO: 9.4 FL (ref 6–12)
POTASSIUM SERPL-SCNC: 3.8 MMOL/L (ref 3.5–5.2)
RBC # BLD AUTO: 4.32 10*6/MM3 (ref 4.14–5.8)
SODIUM SERPL-SCNC: 141 MMOL/L (ref 136–145)
WBC NRBC COR # BLD AUTO: 6.37 10*3/MM3 (ref 3.4–10.8)

## 2024-04-26 PROCEDURE — 80048 BASIC METABOLIC PNL TOTAL CA: CPT

## 2024-04-26 PROCEDURE — 36415 COLL VENOUS BLD VENIPUNCTURE: CPT

## 2024-04-26 PROCEDURE — 85027 COMPLETE CBC AUTOMATED: CPT

## 2024-04-29 ENCOUNTER — HOSPITAL ENCOUNTER (INPATIENT)
Facility: HOSPITAL | Age: 79
LOS: 1 days | Discharge: HOME | DRG: 274 | End: 2024-04-29
Attending: INTERNAL MEDICINE | Admitting: INTERNAL MEDICINE
Payer: MEDICARE

## 2024-04-29 ENCOUNTER — APPOINTMENT (OUTPATIENT)
Dept: CARDIOLOGY | Facility: HOSPITAL | Age: 79
DRG: 274 | End: 2024-04-29
Payer: MEDICARE

## 2024-04-29 ENCOUNTER — ANESTHESIA EVENT (OUTPATIENT)
Dept: CARDIOLOGY | Facility: HOSPITAL | Age: 79
DRG: 274 | End: 2024-04-29
Payer: MEDICARE

## 2024-04-29 ENCOUNTER — ANESTHESIA (OUTPATIENT)
Dept: CARDIOLOGY | Facility: HOSPITAL | Age: 79
DRG: 274 | End: 2024-04-29
Payer: MEDICARE

## 2024-04-29 VITALS
RESPIRATION RATE: 10 BRPM | BODY MASS INDEX: 29.94 KG/M2 | HEART RATE: 66 BPM | WEIGHT: 186.29 LBS | DIASTOLIC BLOOD PRESSURE: 63 MMHG | TEMPERATURE: 97.5 F | SYSTOLIC BLOOD PRESSURE: 128 MMHG | OXYGEN SATURATION: 95 % | HEIGHT: 66 IN

## 2024-04-29 DIAGNOSIS — I48.0 PAROXYSMAL ATRIAL FIBRILLATION: ICD-10-CM

## 2024-04-29 LAB
BH CV VAS BP LEFT ARM: NORMAL MMHG
LV EF 2D ECHO EST: 50 %
POTASSIUM SERPL-SCNC: 4.1 MMOL/L (ref 3.5–5.2)

## 2024-04-29 PROCEDURE — 25010000002 PROTAMINE SULFATE PER 10 MG: Performed by: INTERNAL MEDICINE

## 2024-04-29 PROCEDURE — C1769 GUIDE WIRE: HCPCS | Performed by: INTERNAL MEDICINE

## 2024-04-29 PROCEDURE — 25810000003 SODIUM CHLORIDE 0.9 % SOLUTION 250 ML FLEX CONT

## 2024-04-29 PROCEDURE — S0260 H&P FOR SURGERY: HCPCS | Performed by: INTERNAL MEDICINE

## 2024-04-29 PROCEDURE — 25010000002 PROPOFOL 10 MG/ML EMULSION

## 2024-04-29 PROCEDURE — 25010000002 DEXAMETHASONE PER 1 MG

## 2024-04-29 PROCEDURE — 25010000002 CEFAZOLIN PER 500 MG: Performed by: PHYSICIAN ASSISTANT

## 2024-04-29 PROCEDURE — 84132 ASSAY OF SERUM POTASSIUM: CPT | Performed by: ANESTHESIOLOGY

## 2024-04-29 PROCEDURE — 93662 INTRACARDIAC ECG (ICE): CPT | Performed by: INTERNAL MEDICINE

## 2024-04-29 PROCEDURE — 85347 COAGULATION TIME ACTIVATED: CPT

## 2024-04-29 PROCEDURE — 25010000002 SUGAMMADEX 200 MG/2ML SOLUTION

## 2024-04-29 PROCEDURE — 25010000002 HEPARIN (PORCINE) PER 1000 UNITS: Performed by: INTERNAL MEDICINE

## 2024-04-29 PROCEDURE — 33340 PERQ CLSR TCAT L ATR APNDGE: CPT | Performed by: INTERNAL MEDICINE

## 2024-04-29 PROCEDURE — 93355 ECHO TRANSESOPHAGEAL (TEE): CPT

## 2024-04-29 PROCEDURE — 25010000002 ONDANSETRON PER 1 MG

## 2024-04-29 PROCEDURE — C1894 INTRO/SHEATH, NON-LASER: HCPCS | Performed by: INTERNAL MEDICINE

## 2024-04-29 PROCEDURE — 25510000001 IOPAMIDOL PER 1 ML: Performed by: INTERNAL MEDICINE

## 2024-04-29 PROCEDURE — C1889 IMPLANT/INSERT DEVICE, NOC: HCPCS | Performed by: INTERNAL MEDICINE

## 2024-04-29 PROCEDURE — C1893 INTRO/SHEATH, FIXED,NON-PEEL: HCPCS | Performed by: INTERNAL MEDICINE

## 2024-04-29 PROCEDURE — C1759 CATH, INTRA ECHOCARDIOGRAPHY: HCPCS | Performed by: INTERNAL MEDICINE

## 2024-04-29 PROCEDURE — 02L73DK OCCLUSION OF LEFT ATRIAL APPENDAGE WITH INTRALUMINAL DEVICE, PERCUTANEOUS APPROACH: ICD-10-PCS | Performed by: INTERNAL MEDICINE

## 2024-04-29 PROCEDURE — 25810000003 SODIUM CHLORIDE 0.9 % SOLUTION: Performed by: INTERNAL MEDICINE

## 2024-04-29 PROCEDURE — 25810000003 SODIUM CHLORIDE 0.9 % SOLUTION

## 2024-04-29 PROCEDURE — 25010000002 LIDOCAINE 1 % SOLUTION: Performed by: INTERNAL MEDICINE

## 2024-04-29 PROCEDURE — 25010000002 PHENYLEPHRINE 10 MG/ML SOLUTION 1 ML VIAL

## 2024-04-29 PROCEDURE — C1760 CLOSURE DEV, VASC: HCPCS | Performed by: INTERNAL MEDICINE

## 2024-04-29 DEVICE — KT OCCL ATRIAL APPENDAGE AMPLATZER AMULET 22X28X7.5MM: Type: IMPLANTABLE DEVICE | Status: FUNCTIONAL

## 2024-04-29 RX ORDER — DEXAMETHASONE SODIUM PHOSPHATE 4 MG/ML
INJECTION, SOLUTION INTRA-ARTICULAR; INTRALESIONAL; INTRAMUSCULAR; INTRAVENOUS; SOFT TISSUE AS NEEDED
Status: DISCONTINUED | OUTPATIENT
Start: 2024-04-29 | End: 2024-04-29 | Stop reason: SURG

## 2024-04-29 RX ORDER — ROCURONIUM BROMIDE 10 MG/ML
INJECTION, SOLUTION INTRAVENOUS AS NEEDED
Status: DISCONTINUED | OUTPATIENT
Start: 2024-04-29 | End: 2024-04-29 | Stop reason: SURG

## 2024-04-29 RX ORDER — SODIUM CHLORIDE 9 MG/ML
40 INJECTION, SOLUTION INTRAVENOUS AS NEEDED
Status: DISCONTINUED | OUTPATIENT
Start: 2024-04-29 | End: 2024-04-29 | Stop reason: HOSPADM

## 2024-04-29 RX ORDER — MIDAZOLAM HYDROCHLORIDE 1 MG/ML
0.5 INJECTION INTRAMUSCULAR; INTRAVENOUS
Status: DISCONTINUED | OUTPATIENT
Start: 2024-04-29 | End: 2024-04-29 | Stop reason: HOSPADM

## 2024-04-29 RX ORDER — MEPERIDINE HYDROCHLORIDE 25 MG/ML
12.5 INJECTION INTRAMUSCULAR; INTRAVENOUS; SUBCUTANEOUS
Status: DISCONTINUED | OUTPATIENT
Start: 2024-04-29 | End: 2024-04-29 | Stop reason: HOSPADM

## 2024-04-29 RX ORDER — ACETAMINOPHEN 325 MG/1
650 TABLET ORAL EVERY 4 HOURS PRN
Status: DISCONTINUED | OUTPATIENT
Start: 2024-04-29 | End: 2024-04-29 | Stop reason: HOSPADM

## 2024-04-29 RX ORDER — SODIUM CHLORIDE 0.9 % (FLUSH) 0.9 %
10 SYRINGE (ML) INJECTION EVERY 12 HOURS SCHEDULED
Status: DISCONTINUED | OUTPATIENT
Start: 2024-04-29 | End: 2024-04-29 | Stop reason: HOSPADM

## 2024-04-29 RX ORDER — DROPERIDOL 2.5 MG/ML
0.62 INJECTION, SOLUTION INTRAMUSCULAR; INTRAVENOUS
Status: DISCONTINUED | OUTPATIENT
Start: 2024-04-29 | End: 2024-04-29 | Stop reason: HOSPADM

## 2024-04-29 RX ORDER — HYDROCODONE BITARTRATE AND ACETAMINOPHEN 5; 325 MG/1; MG/1
1 TABLET ORAL ONCE AS NEEDED
Status: DISCONTINUED | OUTPATIENT
Start: 2024-04-29 | End: 2024-04-29 | Stop reason: HOSPADM

## 2024-04-29 RX ORDER — SODIUM CHLORIDE 0.9 % (FLUSH) 0.9 %
10 SYRINGE (ML) INJECTION AS NEEDED
Status: DISCONTINUED | OUTPATIENT
Start: 2024-04-29 | End: 2024-04-29 | Stop reason: HOSPADM

## 2024-04-29 RX ORDER — NITROGLYCERIN 0.4 MG/1
0.4 TABLET SUBLINGUAL
Status: DISCONTINUED | OUTPATIENT
Start: 2024-04-29 | End: 2024-04-29 | Stop reason: HOSPADM

## 2024-04-29 RX ORDER — ONDANSETRON 2 MG/ML
4 INJECTION INTRAMUSCULAR; INTRAVENOUS EVERY 6 HOURS PRN
Status: DISCONTINUED | OUTPATIENT
Start: 2024-04-29 | End: 2024-04-29 | Stop reason: HOSPADM

## 2024-04-29 RX ORDER — SODIUM CHLORIDE 0.9 % (FLUSH) 0.9 %
3 SYRINGE (ML) INJECTION EVERY 12 HOURS SCHEDULED
Status: DISCONTINUED | OUTPATIENT
Start: 2024-04-29 | End: 2024-04-29 | Stop reason: HOSPADM

## 2024-04-29 RX ORDER — SODIUM CHLORIDE, SODIUM LACTATE, POTASSIUM CHLORIDE, CALCIUM CHLORIDE 600; 310; 30; 20 MG/100ML; MG/100ML; MG/100ML; MG/100ML
9 INJECTION, SOLUTION INTRAVENOUS CONTINUOUS
Status: DISCONTINUED | OUTPATIENT
Start: 2024-04-29 | End: 2024-04-29 | Stop reason: HOSPADM

## 2024-04-29 RX ORDER — HEPARIN SODIUM 1000 [USP'U]/ML
INJECTION, SOLUTION INTRAVENOUS; SUBCUTANEOUS
Status: DISCONTINUED | OUTPATIENT
Start: 2024-04-29 | End: 2024-04-29 | Stop reason: HOSPADM

## 2024-04-29 RX ORDER — SODIUM CHLORIDE 9 MG/ML
INJECTION, SOLUTION INTRAVENOUS CONTINUOUS PRN
Status: DISCONTINUED | OUTPATIENT
Start: 2024-04-29 | End: 2024-04-29 | Stop reason: SURG

## 2024-04-29 RX ORDER — FAMOTIDINE 20 MG/1
20 TABLET, FILM COATED ORAL ONCE
Status: DISCONTINUED | OUTPATIENT
Start: 2024-04-29 | End: 2024-04-29 | Stop reason: HOSPADM

## 2024-04-29 RX ORDER — DROPERIDOL 2.5 MG/ML
0.62 INJECTION, SOLUTION INTRAMUSCULAR; INTRAVENOUS ONCE AS NEEDED
Status: DISCONTINUED | OUTPATIENT
Start: 2024-04-29 | End: 2024-04-29 | Stop reason: HOSPADM

## 2024-04-29 RX ORDER — ONDANSETRON 2 MG/ML
INJECTION INTRAMUSCULAR; INTRAVENOUS AS NEEDED
Status: DISCONTINUED | OUTPATIENT
Start: 2024-04-29 | End: 2024-04-29 | Stop reason: SURG

## 2024-04-29 RX ORDER — ONDANSETRON 2 MG/ML
4 INJECTION INTRAMUSCULAR; INTRAVENOUS ONCE AS NEEDED
Status: DISCONTINUED | OUTPATIENT
Start: 2024-04-29 | End: 2024-04-29 | Stop reason: HOSPADM

## 2024-04-29 RX ORDER — PROPOFOL 10 MG/ML
VIAL (ML) INTRAVENOUS AS NEEDED
Status: DISCONTINUED | OUTPATIENT
Start: 2024-04-29 | End: 2024-04-29 | Stop reason: SURG

## 2024-04-29 RX ORDER — IPRATROPIUM BROMIDE AND ALBUTEROL SULFATE 2.5; .5 MG/3ML; MG/3ML
3 SOLUTION RESPIRATORY (INHALATION) ONCE AS NEEDED
Status: DISCONTINUED | OUTPATIENT
Start: 2024-04-29 | End: 2024-04-29 | Stop reason: HOSPADM

## 2024-04-29 RX ORDER — LABETALOL HYDROCHLORIDE 5 MG/ML
5 INJECTION, SOLUTION INTRAVENOUS
Status: DISCONTINUED | OUTPATIENT
Start: 2024-04-29 | End: 2024-04-29 | Stop reason: HOSPADM

## 2024-04-29 RX ORDER — FAMOTIDINE 10 MG/ML
20 INJECTION, SOLUTION INTRAVENOUS ONCE
Status: COMPLETED | OUTPATIENT
Start: 2024-04-29 | End: 2024-04-29

## 2024-04-29 RX ORDER — ASPIRIN 81 MG/1
81 TABLET ORAL DAILY
Qty: 90 TABLET | Refills: 1 | Status: SHIPPED | OUTPATIENT
Start: 2024-04-29

## 2024-04-29 RX ORDER — LIDOCAINE HYDROCHLORIDE 10 MG/ML
INJECTION, SOLUTION EPIDURAL; INFILTRATION; INTRACAUDAL; PERINEURAL AS NEEDED
Status: DISCONTINUED | OUTPATIENT
Start: 2024-04-29 | End: 2024-04-29 | Stop reason: SURG

## 2024-04-29 RX ORDER — FENTANYL CITRATE 50 UG/ML
50 INJECTION, SOLUTION INTRAMUSCULAR; INTRAVENOUS
Status: DISCONTINUED | OUTPATIENT
Start: 2024-04-29 | End: 2024-04-29 | Stop reason: HOSPADM

## 2024-04-29 RX ORDER — LIDOCAINE HYDROCHLORIDE 10 MG/ML
0.5 INJECTION, SOLUTION EPIDURAL; INFILTRATION; INTRACAUDAL; PERINEURAL ONCE AS NEEDED
Status: DISCONTINUED | OUTPATIENT
Start: 2024-04-29 | End: 2024-04-29 | Stop reason: HOSPADM

## 2024-04-29 RX ORDER — PROMETHAZINE HYDROCHLORIDE 25 MG/1
25 TABLET ORAL ONCE AS NEEDED
Status: DISCONTINUED | OUTPATIENT
Start: 2024-04-29 | End: 2024-04-29 | Stop reason: HOSPADM

## 2024-04-29 RX ORDER — HYDRALAZINE HYDROCHLORIDE 20 MG/ML
5 INJECTION INTRAMUSCULAR; INTRAVENOUS
Status: DISCONTINUED | OUTPATIENT
Start: 2024-04-29 | End: 2024-04-29 | Stop reason: HOSPADM

## 2024-04-29 RX ORDER — LIDOCAINE HYDROCHLORIDE 10 MG/ML
INJECTION, SOLUTION INFILTRATION; PERINEURAL
Status: DISCONTINUED | OUTPATIENT
Start: 2024-04-29 | End: 2024-04-29 | Stop reason: HOSPADM

## 2024-04-29 RX ORDER — HYDROMORPHONE HYDROCHLORIDE 1 MG/ML
0.5 INJECTION, SOLUTION INTRAMUSCULAR; INTRAVENOUS; SUBCUTANEOUS
Status: DISCONTINUED | OUTPATIENT
Start: 2024-04-29 | End: 2024-04-29 | Stop reason: HOSPADM

## 2024-04-29 RX ORDER — PROMETHAZINE HYDROCHLORIDE 25 MG/1
25 SUPPOSITORY RECTAL ONCE AS NEEDED
Status: DISCONTINUED | OUTPATIENT
Start: 2024-04-29 | End: 2024-04-29 | Stop reason: HOSPADM

## 2024-04-29 RX ORDER — CEFAZOLIN SODIUM 2 G/100ML
2 INJECTION, SOLUTION INTRAVENOUS ONCE
Status: DISCONTINUED | OUTPATIENT
Start: 2024-04-29 | End: 2024-04-29

## 2024-04-29 RX ORDER — NALOXONE HCL 0.4 MG/ML
0.4 VIAL (ML) INJECTION AS NEEDED
Status: DISCONTINUED | OUTPATIENT
Start: 2024-04-29 | End: 2024-04-29 | Stop reason: HOSPADM

## 2024-04-29 RX ORDER — SODIUM CHLORIDE 9 MG/ML
INJECTION, SOLUTION INTRAVENOUS
Status: DISCONTINUED | OUTPATIENT
Start: 2024-04-29 | End: 2024-04-29 | Stop reason: HOSPADM

## 2024-04-29 RX ORDER — PROTAMINE SULFATE 10 MG/ML
INJECTION, SOLUTION INTRAVENOUS
Status: DISCONTINUED | OUTPATIENT
Start: 2024-04-29 | End: 2024-04-29 | Stop reason: HOSPADM

## 2024-04-29 RX ORDER — SODIUM CHLORIDE 0.9 % (FLUSH) 0.9 %
3 SYRINGE (ML) INJECTION EVERY 12 HOURS SCHEDULED
Status: CANCELLED | OUTPATIENT
Start: 2024-04-29

## 2024-04-29 RX ORDER — SODIUM CHLORIDE 0.9 % (FLUSH) 0.9 %
3-10 SYRINGE (ML) INJECTION AS NEEDED
Status: CANCELLED | OUTPATIENT
Start: 2024-04-29

## 2024-04-29 RX ORDER — SODIUM CHLORIDE 9 MG/ML
40 INJECTION, SOLUTION INTRAVENOUS AS NEEDED
Status: CANCELLED | OUTPATIENT
Start: 2024-04-29

## 2024-04-29 RX ADMIN — SODIUM CHLORIDE: 9 INJECTION, SOLUTION INTRAVENOUS at 12:30

## 2024-04-29 RX ADMIN — FAMOTIDINE 20 MG: 10 INJECTION, SOLUTION INTRAVENOUS at 11:55

## 2024-04-29 RX ADMIN — PHENYLEPHRINE HYDROCHLORIDE 0.2 MCG/KG/MIN: 10 INJECTION INTRAVENOUS at 12:43

## 2024-04-29 RX ADMIN — LIDOCAINE HYDROCHLORIDE 50 MG: 10 INJECTION, SOLUTION EPIDURAL; INFILTRATION; INTRACAUDAL; PERINEURAL at 12:36

## 2024-04-29 RX ADMIN — PROPOFOL 150 MG: 10 INJECTION, EMULSION INTRAVENOUS at 12:36

## 2024-04-29 RX ADMIN — ROCURONIUM BROMIDE 50 MG: 10 SOLUTION INTRAVENOUS at 12:36

## 2024-04-29 RX ADMIN — DEXAMETHASONE SODIUM PHOSPHATE 4 MG: 4 INJECTION, SOLUTION INTRAMUSCULAR; INTRAVENOUS at 12:41

## 2024-04-29 RX ADMIN — ONDANSETRON 4 MG: 2 INJECTION INTRAMUSCULAR; INTRAVENOUS at 12:34

## 2024-04-29 RX ADMIN — SUGAMMADEX 200 MG: 100 INJECTION, SOLUTION INTRAVENOUS at 13:28

## 2024-04-29 RX ADMIN — SODIUM CHLORIDE 2000 MG: 900 INJECTION INTRAVENOUS at 12:43

## 2024-04-29 NOTE — ANESTHESIA PROCEDURE NOTES
Airway  Urgency: elective    Date/Time: 4/29/2024 12:39 PM  Airway not difficult    General Information and Staff    Patient location during procedure: OR    Indications and Patient Condition  Indications for airway management: airway protection    Preoxygenated: yes  MILS not maintained throughout  Mask difficulty assessment: 3 - difficult mask (inadequate, unstable or two providers) +/- NMBA    Final Airway Details  Final airway type: endotracheal airway      Successful airway: ETT  Cuffed: yes   Successful intubation technique: video laryngoscopy  Facilitating devices/methods: intubating stylet  Endotracheal tube insertion site: oral  Blade: Ivy  Blade size: 4  ETT size (mm): 7.5  Cormack-Lehane Classification: grade I - full view of glottis  Placement verified by: chest auscultation and capnometry   Cuff volume (mL): 6  Measured from: lips  ETT/EBT  to lips (cm): 22  Number of attempts at approach: 1  Assessment: lips, teeth, and gum same as pre-op and atraumatic intubation    Additional Comments  Negative epigastric sounds, Breath sound equal bilaterally with symmetric chest rise and fall

## 2024-04-29 NOTE — Clinical Note
Hemostasis started on the right femoral vein. Perclose was used in achieving hemostasis. Closure device deployed in the vessel. Hemostasis achieved successfully. Closure device additional comment: Perclose x 2

## 2024-04-29 NOTE — Clinical Note
Replaced previous sheath in the right femoral vein. Transseptal sheath exchanged for Amplatzer TorqVue sheath

## 2024-04-29 NOTE — ANESTHESIA PREPROCEDURE EVALUATION
Anesthesia Evaluation     Patient summary reviewed and Nursing notes reviewed   no history of anesthetic complications:   NPO Solid Status: > 8 hours  NPO Liquid Status: > 8 hours           Airway   Mallampati: II  TM distance: >3 FB  Neck ROM: full  No difficulty expected  Dental      Pulmonary - normal exam   (+) ,sleep apnea  Cardiovascular - normal exam    (+) hypertension, CAD, CABG, dysrhythmias, angina, hyperlipidemia,  carotid artery disease      Neuro/Psych  (+) numbness, psychiatric history  GI/Hepatic/Renal/Endo    (+) hepatitis, liver disease, renal disease-    Musculoskeletal     Abdominal    Substance History      OB/GYN          Other   arthritis,                 Anesthesia Plan    ASA 3     general     intravenous induction     Anesthetic plan, risks, benefits, and alternatives have been provided, discussed and informed consent has been obtained with: patient.    Plan discussed with CRNA.    CODE STATUS:

## 2024-04-29 NOTE — NURSING NOTE
LAAO Procedure Information   Keyon Olivas 1945   [address] [place] [address] [phone] (home)       JASMINA Orifice Maximal Width: 19  mm  Cumulative Air Kerma:  118 mGy  Contrast Volume:  30 mL  Dose Area Product:  644.01 ?Gy-M2

## 2024-04-29 NOTE — ANESTHESIA POSTPROCEDURE EVALUATION
Patient: Keyon Olivas    Procedure Summary       Date: 04/29/24 Room / Location: [facility]    Anesthesia Start: 1230 Anesthesia Stop: 1336    Procedure: Atrial Appendage Occlusion Diagnosis:       Paroxysmal atrial fibrillation      (Atrial Fibrillation)    Providers: Nilesh Murdock MD Provider: Mat Garcia MD    Anesthesia Type: general ASA Status: 3            Anesthesia Type: general    Vitals  Vitals Value Taken Time   /63 04/29/24 1336   Temp 97.5 °F (36.4 °C) 04/29/24 1336   Pulse 62 04/29/24 1336   Resp 16 04/29/24 1336   SpO2 99 % 04/29/24 1336           Post Anesthesia Care and Evaluation    Patient location during evaluation: PACU  Patient participation: complete - patient participated  Level of consciousness: sleepy but conscious  Pain management: adequate    Airway patency: patent  Anesthetic complications: No anesthetic complications  PONV Status: none  Cardiovascular status: hemodynamically stable and acceptable  Respiratory status: nonlabored ventilation, acceptable and nasal cannula  Hydration status: acceptable

## 2024-04-29 NOTE — H&P
[facility]  HISTORY AND PHYSICAL    Keyon Olivas  : 1945  MRN:[id number]    Date of Admission:2024    PCP: Osman Olivas MD    Chief Complaint: Paroxysmal atrial fibrillation    PROBLEM LIST:   Paroxysmal atrial fibrillation  [id number] 4, anticoagulated Eliquis  Brief episode 2022, anticoagulation was not started  Monitor, showed atrial fibrillation started on Eliquis by Dr. Samaniego (data deficit)  Monitor, 2023 NSR with SVT and ST  PVA, RFA of typical AVNRT, 2024  Coronary artery disease  Coronary artery bypass grafting x3, 2000.  By report, the patient was grafted with LIMA to LAD, left radial artery to the RCA, and left radial artery as a T graft from  internal mammary artery to the OM 2.   Reported stenting to the RCA and OM 2, 2006   Repeat catheterization, 2022.  The patient demonstrated patent LIMA to LAD but with diffuse disease distally.  The free radial artery to the RCA was patent but felt to be very small.  No grafting of the OM was noted.  The patient had diffuse disease to the distal LAD, RCA, and circumflex.  Eventually, he did have angioplasty only to the OM.    Repeat catheterization, 3/2023, with stenting of the left main and of the first OM.  Anatomy was stable to that mentioned above   HFpEF  Echo, 2020: EF 50%  Echo, 2022: EF 50 to 60%  Echo, 2022: EF 50%, aortic valve is calcified with trace to mild aortic insufficiency no significant stenosis   Hypertension  Dyslipidemia  Ulcerative colitis, with ileostomy  History of GI bleed  Nonobstructive carotid artery disease, 2022      ALLERGIES:   Allergies   Allergen Reactions    Crestor [Rosuvastatin] Myalgia    Isosorbide Dinitrate Other (See Comments) and Headache     Weakness, fatigue    Lescol [Fluvastatin Sodium] Myalgia    Lipitor [Atorvastatin] Myalgia    Niacin And Related Rash    Penicillins Rash       HOME MEDICINES:    Cannot display prior to admission  medications because the patient has not been admitted in this contact.            Subjective     HPI: Keyon Olivas is a 78 y.o. male with a past medical history listed above presents today for LAAO device implantation.  In January 2024 patient had PVA and RFA of AVNRT.  Since the procedure he has been doing well. No reoccurrences of atrial fibrillation. Patient has ulcerative colitis and history of GI bleed making him not a good candidate for long-term anticoagulation.  Patient has had worsening shortness of breath over the last few months. He states he cannot walk short distances without sitting down. Patient denies chest pain, lightheadedness, dizziness, and syncope.  He has had no recent illnesses or fevers.  No recent bleeding complications or strokelike symptoms.      ROS: All systems have been reviewed and are negative with the exception of those mentioned in the HPI and problem list above.    Past Medical History:   Past Medical History:   Diagnosis Date    ANNA MARIE (acute kidney injury) 08/31/2022    Anxiety and depression 03/09/2017    Atrial fibrillation 08/31/2022    Bilateral carotid artery stenosis 08/23/2022    BPH (benign prostatic hyperplasia) 03/09/2017    CAD (coronary artery disease) 03/09/2017    COVID-19     02/2022    COVID-19 vaccine administered     02/26/2021, 03/26/2021, 03/10/2022 - Moderna    DJD (degenerative joint disease) 03/09/2017    Dyslipidemia 03/09/2017    Elevated cholesterol     Hepatitis C     History of hepatitis C; followed by Dr. Cash in [place].    History of transfusion [address] [place] [address], NO REACTION; PT STATED HE CONTRACTED HEP C    Hyperlipidemia     Hypertension 03/09/2017    Insomnia 03/09/2017    Neuropathy     DEACON (obstructive sleep apnea) 03/09/2017    NOT USING CPAP    Poor short term memory 03/09/2017    Small fiber neuropathy     Ulcerative colitis 03/09/2017      Surgical History:   Past Surgical History:   Procedure Laterality Date    CARDIAC CATHETERIZATION   "2000    CARDIAC CATHETERIZATION      PCI    CARDIAC ELECTROPHYSIOLOGY PROCEDURE N/A 2024    Procedure: PVA (paroxysmal), Carto, hold Eliquis morning of;  Surgeon: Nilesh Murdock MD;  Location: [facility] INVASIVE LOCATION;  Service: Cardiovascular;  Laterality: N/A;    COLECTOMY TOTAL      Ulcerative colitis, status post total colectomy with ileostomy.     COLONOSCOPY      CORONARY ARTERY BYPASS GRAFT  05/11/2000    x3    CORONARY STENT PLACEMENT  2006    ENDOSCOPY      MUSCLE FLAP      ON BOTTOM    PILONIDAL CYSTECTOMY       Social History:   Social History     Socioeconomic History    Marital status:    Tobacco Use    Smoking status: Former     Current packs/day: 0.00     Average packs/day: 1 pack/day for 18.0 years (18.0 ttl pk-yrs)     Types: Cigarettes     Start date: 1965     Quit date: 1983     Years since quittin.3    Smokeless tobacco: Former     Types: Chew    Tobacco comments:     smoked 10-20 years, < last 1 PPD   Vaping Use    Vaping status: Never Used   Substance and Sexual Activity    Alcohol use: No    Drug use: No    Sexual activity: Defer     Family History:   Family History   Problem Relation Age of Onset    Emphysema Mother     Heart failure Mother     Heart attack Father     Memory loss Brother        Objective   /79 (BP Location: Left arm, Patient Position: Lying)   Pulse 70   Temp 97.5 °F (36.4 °C) (Tympanic)   Resp 16   Ht 167.6 cm (66\")   Wt 84.5 kg (186 lb 4.6 oz)   SpO2 100%   BMI 30.07 kg/m²   No intake or output data in the 24 hours ending 24 1102    PHYSICAL EXAM:  CONSTITUTIONAL: Well nourished, cooperative, in no acute distress  HEENT: Normocephalic, atraumatic, PERRLA, no JVD  CARDIOVASCULAR:  Regular rhythm and normal rate, no murmur, gallop, rub.   RESPIRATORY: Clear to auscultation, normal respiratory effort, no wheezing, rales or ronchi, on room air  EXTREMITIES: No gross deformities, no edema. Peripheral pulses are present " and equal bilaterally  SKIN: Warm, dry. No bleeding, bruising or rash  NEUROLOGICAL: No focal deficits  PSYCHIATRIC: Normal mood and affect. Behavior is normal     Labs/Diagnostic Data  Results from last 7 days   Lab Units 04/26/24  0830   SODIUM mmol/L 141   POTASSIUM mmol/L 3.8   CHLORIDE mmol/L 106   CO2 mmol/L 26.0   BUN mg/dL 20   CREATININE mg/dL 1.47*   GLUCOSE mg/dL 64*   CALCIUM mg/dL 9.4         Results from last 7 days   Lab Units 04/26/24  0830   WBC 10*3/mm3 6.37   HEMOGLOBIN g/dL 12.1*   HEMATOCRIT % 37.3*   PLATELETS 10*3/mm3 151                               EKG/Telemetry: NSR    Radiology Data:   No radiology results for the last day   Results for orders placed during the hospital encounter of 08/31/22    Adult Transthoracic Echo Complete W/ Cont if Necessary Per Protocol    Interpretation Summary  · Left ventricular systolic function is normal. Estimated left ventricular EF = 50%.  · Left ventricular wall thickness is consistent with mild concentric hypertrophy.  · The aortic valve is calcified with trace to mild aortic insufficiency no significant stenosis.  · Estimated right ventricular systolic pressure from tricuspid regurgitation is normal (<35 mmHg).       Current Medications:  ceFAZolin, 2,000 mg, Intravenous, Once  famotidine, 20 mg, Intravenous, Once  famotidine, 20 mg, Oral, Once  sodium chloride, 10 mL, Intravenous, Q12H  sodium chloride, 3 mL, Intravenous, Q12H      lactated ringers, 9 mL/hr        Assessment:     Paroxysmal atrial fibrillation  [id number] 4, currently on eliquis  S/p PVA and RFA of AVNRT  Ulcerative colitis, with history of GI bleeding  Patient is not a good candidate for long-term anticoagulation  Coronary artery disease  S/p CABG and PCIs  Shortness of breath  Echo, 8/2022: EF 50%, aortic valve is calcified with trace to mild aortic insufficiency no significant stenosis     Plan:     Patient is here today for LAAO device implantation.  Patient will receive 2 g  Ancef for surgical prophylaxis procedure, risks, and alternatives have been discussed with the patient he is agreeable to proceed.  Further recommendations to follow.    Electronically signed by LISSET Ken, 04/29/24, 8:05 AM EDT.     Please note that portions of this note were dictated utilizing Dragon dictation.

## 2024-04-30 ENCOUNTER — CALL CENTER PROGRAMS (OUTPATIENT)
Dept: CALL CENTER | Facility: HOSPITAL | Age: 79
End: 2024-04-30
Payer: MEDICARE

## 2024-04-30 LAB — ACT BLD: 417 SECONDS (ref 82–152)

## 2024-04-30 NOTE — OUTREACH NOTE
PCI/Device Survey      Flowsheet Row Responses   Facility patient discharged from? [facility]   Procedure date 04/29/24   Procedure (if device, specify in description) Device   Device Description 22 mm Amulet Amplatzer left atrial appendage occlusive device.   Performing MD Other (annotate)  [Dr. Murdock]   Attempt successful? Yes   Call start time 1132   Call end time 1134   Is the patient taking prescribed medications: ASA, Plavix   Nursing intervention Reminded to continue to take prescribed medications, Nurse provided patient education   Does the patient have any of the following symptoms related to the cath/surgical site? --  [Pt reports no issues noted with R. groin site, dressing CDI]   Nursing intervention Patient education provided   Does the patient have an appointment scheduled with the cardiologist? Yes   If the patient is a current smoker, are they able to teach back resources for cessation? Not a smoker   Did the patient feel prepared to go home on the same day as the procedure? Yes   Is the patient satisfied with the same day discharge process? Yes   PCI/Device call completed Yes            Crissy Durham Registered Nurse

## 2024-05-01 DIAGNOSIS — Z95.818 PRESENCE OF AMULET LEFT ATRIAL APPENDAGE CLOSURE DEVICE: ICD-10-CM

## 2024-05-01 DIAGNOSIS — I48.0 PAROXYSMAL ATRIAL FIBRILLATION: Primary | ICD-10-CM

## 2024-05-23 ENCOUNTER — TELEPHONE (OUTPATIENT)
Dept: CARDIOLOGY | Facility: CLINIC | Age: 79
End: 2024-05-23
Payer: MEDICARE

## 2024-05-23 DIAGNOSIS — R06.02 SHORTNESS OF BREATH: Primary | ICD-10-CM

## 2024-05-23 NOTE — TELEPHONE ENCOUNTER
Called pt, he stated he had a watchman put in a few weeks ago and has orthostatic hypotension. Stated he's fine if he's sitting, but if he gets up he gets out of breath. States he gets more out of breath w/ even minimal activity- like picking something up, makes him very SoB.     Stated that he was told they may have been a valve issue on SHALONDA, so he wondered if that was part of the problem.   I asked pt- he does not have a lung specialist.   Denies any palps or CP.

## 2024-05-23 NOTE — TELEPHONE ENCOUNTER
Caller: Keyon Olivas    Relationship to patient: Self    Best call back number: [phone]     Chief complaint: PT HAD A WATCHMAN PUT IN, HAS BEEN HAVING SOB, MAY HAVE A BAD VALVE     Type of visit: F/U     Requested date: ASAP     If rescheduling, when is the original appointment: 8/20/24     NOT ACTIVE AS OF PHONE CALL

## 2024-05-24 NOTE — TELEPHONE ENCOUNTER
Called and informed pt of message from Anay. He is aware to go to ER for any new or worsening sx.

## 2024-05-24 NOTE — TELEPHONE ENCOUNTER
Anay Headley, Kelli Lockhart  Caller: Unspecified (Yesterday,  9:00 AM)  I reviewed the SHALONDA findings and I do not see any significant valvular abnormalities that would explain his symptoms.  I would like to get an echo done, ASAP.  I will put the order in.

## 2024-05-29 ENCOUNTER — HOSPITAL ENCOUNTER (OUTPATIENT)
Dept: CARDIOLOGY | Facility: HOSPITAL | Age: 79
Discharge: HOME | End: 2024-05-29
Admitting: CLINICAL NURSE SPECIALIST
Payer: MEDICARE

## 2024-05-29 DIAGNOSIS — R06.02 SHORTNESS OF BREATH: ICD-10-CM

## 2024-05-29 PROCEDURE — 93306 TTE W/DOPPLER COMPLETE: CPT

## 2024-06-02 LAB
AORTIC DIMENSIONLESS INDEX: 0.6 (DI)
BH CV ECHO MEAS - ACS: 2.1 CM
BH CV ECHO MEAS - AO MAX PG: 7 MMHG
BH CV ECHO MEAS - AO MEAN PG: 4 MMHG
BH CV ECHO MEAS - AO ROOT DIAM: 3.8 CM
BH CV ECHO MEAS - AO V2 MAX: 132 CM/SEC
BH CV ECHO MEAS - AO V2 VTI: 30.4 CM
BH CV ECHO MEAS - AVA(I,D): 2.08 CM2
BH CV ECHO MEAS - EDV(CUBED): 152.3 ML
BH CV ECHO MEAS - EDV(MOD-SP4): 74.2 ML
BH CV ECHO MEAS - EF(MOD-SP4): 51.6 %
BH CV ECHO MEAS - EF_3D-VOL: 52 %
BH CV ECHO MEAS - ESV(CUBED): 59.8 ML
BH CV ECHO MEAS - ESV(MOD-SP4): 35.9 ML
BH CV ECHO MEAS - FS: 26.8 %
BH CV ECHO MEAS - IVS/LVPW: 1.21 CM
BH CV ECHO MEAS - IVSD: 1.29 CM
BH CV ECHO MEAS - LA A2CS (ATRIAL LENGTH): 5.8 CM
BH CV ECHO MEAS - LA A4C LENGTH: 5.5 CM
BH CV ECHO MEAS - LA DIMENSION: 4.9 CM
BH CV ECHO MEAS - LAT PEAK E' VEL: 8 CM/SEC
BH CV ECHO MEAS - LV DIASTOLIC VOL/BSA (35-75): 38.3 CM2
BH CV ECHO MEAS - LV MASS(C)D: 253.7 GRAMS
BH CV ECHO MEAS - LV MAX PG: 3.1 MMHG
BH CV ECHO MEAS - LV MEAN PG: 2 MMHG
BH CV ECHO MEAS - LV SYSTOLIC VOL/BSA (12-30): 18.5 CM2
BH CV ECHO MEAS - LV V1 MAX: 88.6 CM/SEC
BH CV ECHO MEAS - LV V1 VTI: 18.3 CM
BH CV ECHO MEAS - LVIDD: 5.3 CM
BH CV ECHO MEAS - LVIDS: 3.9 CM
BH CV ECHO MEAS - LVOT AREA: 3.5 CM2
BH CV ECHO MEAS - LVOT DIAM: 2.1 CM
BH CV ECHO MEAS - LVPWD: 1.07 CM
BH CV ECHO MEAS - MED PEAK E' VEL: 5.6 CM/SEC
BH CV ECHO MEAS - MV A MAX VEL: 53.5 CM/SEC
BH CV ECHO MEAS - MV DEC SLOPE: 319 CM/SEC2
BH CV ECHO MEAS - MV DEC TIME: 0.14 SEC
BH CV ECHO MEAS - MV E MAX VEL: 92.7 CM/SEC
BH CV ECHO MEAS - MV E/A: 1.73
BH CV ECHO MEAS - MV MAX PG: 5.7 MMHG
BH CV ECHO MEAS - MV MEAN PG: 2 MMHG
BH CV ECHO MEAS - MV P1/2T: 101.9 MSEC
BH CV ECHO MEAS - MV V2 VTI: 37.4 CM
BH CV ECHO MEAS - MVA(P1/2T): 2.16 CM2
BH CV ECHO MEAS - MVA(VTI): 1.69 CM2
BH CV ECHO MEAS - PA V2 MAX: 74.6 CM/SEC
BH CV ECHO MEAS - RAP SYSTOLE: 10 MMHG
BH CV ECHO MEAS - RV MAX PG: 1.36 MMHG
BH CV ECHO MEAS - RV V1 MAX: 58.3 CM/SEC
BH CV ECHO MEAS - RV V1 VTI: 12.7 CM
BH CV ECHO MEAS - RVDD: 2.8 CM
BH CV ECHO MEAS - RVSP: 47.7 MMHG
BH CV ECHO MEAS - SV(LVOT): 63.4 ML
BH CV ECHO MEAS - SV(MOD-SP4): 38.3 ML
BH CV ECHO MEAS - SVI(LVOT): 32.7 ML/M2
BH CV ECHO MEAS - SVI(MOD-SP4): 19.7 ML/M2
BH CV ECHO MEAS - TR MAX PG: 37.7 MMHG
BH CV ECHO MEAS - TR MAX VEL: 307 CM/SEC
BH CV ECHO MEASUREMENTS AVERAGE E/E' RATIO: 13.63
LEFT ATRIUM VOLUME INDEX: 27.1 ML/M2
LEFT ATRIUM VOLUME: 53 ML

## 2024-06-03 ENCOUNTER — HOSPITAL ENCOUNTER (OUTPATIENT)
Dept: CARDIOLOGY | Facility: HOSPITAL | Age: 79
Discharge: HOME | End: 2024-06-03
Payer: MEDICARE

## 2024-06-07 ENCOUNTER — TELEPHONE (OUTPATIENT)
Dept: CARDIOLOGY | Facility: CLINIC | Age: 79
End: 2024-06-07
Payer: MEDICARE

## 2024-06-07 NOTE — TELEPHONE ENCOUNTER
----- Message from Anay Headley sent at 6/4/2024  8:21 AM EDT -----  No significant change in EF.  Grade 2 diastolic dysfunction and pulm HTN could explain shortness of breath.  No significant valvular abnormality.  He has some mild mitral regurgitation and trivial to mild tricuspid regurgitation.  Can you please see   how he is doing?  If the shortness of breath has not improved I will need to make some med adjustments.

## 2024-06-07 NOTE — TELEPHONE ENCOUNTER
Called pt, he was driving and stated he could hardly hear me. Stated we could talk Monday when we reopen.

## 2024-06-10 NOTE — TELEPHONE ENCOUNTER
Called and informed pt of his results and the message from gustabo. Pt states that he feels worse most days, but definitely not any better. Pt denies any med changes since last seen  Px: Ayla

## 2024-07-10 NOTE — PROGRESS NOTES
Keyon Olivas  [id number]  1945  [phone]      07/12/2024      [facility] CARDIOLOGY     Referring Provider: No ref. provider found     Osman Olivas MD  [address] [place] [id number]    Chief Complaint   Patient presents with    Paroxysmal atrial fibrillation       Problem List  Paroxysmal atrial fibrillation  [id number] 4, anticoagulated Eliquis  Brief episode 8/2022, anticoagulation was not started  Monitor, showed atrial fibrillation started on Eliquis by Dr. Samaniego (data deficit)  Monitor, 9/2023 NSR with SVT and ST  PVA, RFA of typical AVNRT, 1/9/2024, by Dr. Murdock  22 mm Amulet LAAO device, 4/29/24 by Dr. Murdock  Coronary artery disease  Coronary artery bypass grafting x3, 5/2000.  By report, the patient was grafted with LIMA to LAD, left radial artery to the RCA, and left radial artery as a T graft from  internal mammary artery to the OM 2.   Reported stenting to the RCA and OM 2, 8/2006   Repeat catheterization, 8/2022.  The patient demonstrated patent LIMA to LAD but with diffuse disease distally.  The free radial artery to the RCA was patent but felt to be very small.  No grafting of the OM was noted.  The patient had diffuse disease to the distal LAD, RCA, and circumflex.  Eventually, he did have angioplasty only to the OM.    Repeat catheterization, 3/2023, with stenting of the left main and of the first OM.  Anatomy was stable to that mentioned above   HFpEF  Echo, 2/2020: EF 50%  Echo, 7/2022: EF 50 to 60%  Echo, 8/2022: EF 50%, aortic valve is calcified with trace to mild aortic insufficiency no significant stenosis   SHALONDA, 5/29/24: EF 45-50%, grade 2 diastolic dysfunction, moderate biatrial enlargement, pulmonary HTN  Hypertension  Dyslipidemia  Ulcerative colitis, with ileostomy  History of GI bleed  Nonobstructive carotid artery disease, 8/2022      History of Present Illness   Keyon Olivas is a 78 y.o. male who presents to my electrophysiology  clinic for follow up of PAF/HFpEF/HTN. It has been 6 months since PVA and RFA of typical AVNRT and 3 months since Amulet LAAO device implantation.     Missed his SHALONDA due to severe tinnitus. Overall doing well. Off of AC.     Outpatient Medications Marked as Taking for the 7/12/24 encounter (Office Visit) with Nilesh Murdock MD   Medication Sig Dispense Refill    aspirin 81 MG EC tablet Take 1 tablet by mouth Daily. 90 tablet 1    atorvastatin (LIPITOR) 80 MG tablet Take 1 tablet by mouth Every Night.      Cholecalciferol 25 MCG (1000 UT) tablet Take 1 tablet by mouth Daily.      clopidogrel (PLAVIX) 75 MG tablet Take 1 tablet by mouth Daily. 90 tablet 3    ezetimibe (ZETIA) 10 MG tablet Take 1 tablet by mouth Daily. 30 tablet 11    loperamide (IMODIUM) 2 MG capsule Take 1 capsule by mouth 2 (Two) Times a Day. (Patient taking differently: Take 1 capsule by mouth As Needed.) 30 capsule     metoprolol tartrate (LOPRESSOR) 25 MG tablet Take 1 tablet by mouth 2 (Two) Times a Day. (Patient taking differently: Take 0.5 tablets by mouth 2 (Two) Times a Day.) 180 tablet 3    nitroglycerin (NITROSTAT) 0.4 MG SL tablet Place 1 tablet under the tongue Every 5 (Five) Minutes As Needed for Chest Pain. Take no more than 3 doses in 15 minutes. 35 tablet 5    tamsulosin (FLOMAX) 0.4 MG capsule 24 hr capsule Take 1 capsule by mouth Every Night.      traMADol (ULTRAM) 50 MG tablet Take 1 tablet by mouth Every 6 (Six) Hours As Needed for Moderate Pain. prn      valsartan (DIOVAN) 40 MG tablet Take 0.5 tablets by mouth Daily. 45 tablet 3    venlafaxine (EFFEXOR) 75 MG tablet Take 1 tablet by mouth 2 (Two) Times a Day.      vitamin B-12 (CYANOCOBALAMIN) 1000 MCG tablet Take 1 tablet by mouth Daily.      zolpidem (AMBIEN) 10 MG tablet Take 1 tablet by mouth At Night As Needed.              Physical Exam  Vitals:    07/12/24 1300   BP: 112/66   BP Location: Right arm   Patient Position: Sitting   Pulse: 72   SpO2: 98%   Weight: 84.4 kg (186  "lb)   Height: 167.6 cm (66\")     Body mass index is 30.02 kg/m².  GENERAL: Well-developed, well-nourished patient in no acute distress.  HEENT: NC, AC, PERRLA. MMM  NECK: No JVD. No carotid bruits auscultated.  LUNGS: Clear to auscultation bilaterally.  CARDIOVASCULAR: RRR No murmurs, gallops or rubs noted.   ABDOMEN: Soft, nontender. Positive bowel sounds.  MUSCULOSKELETAL: No gross deformities. No clubbing, cyanosis  EXT: pulses intact, No edema  SKIN: Pink, warm  Neuro: Nonfocal exam. Gait intact    Diagnostic Data  Procedures    Lab Results   Component Value Date    GLUCOSE 64 (L) 04/26/2024    CALCIUM 9.4 04/26/2024     04/26/2024    K 4.1 04/29/2024    CO2 26.0 04/26/2024     04/26/2024    BUN 20 04/26/2024    CREATININE 1.47 (H) 04/26/2024    EGFRIFNONA 63 01/29/2020    BCR 13.6 04/26/2024    ANIONGAP 9.0 04/26/2024     Lab Results   Component Value Date    WBC 6.37 04/26/2024    HGB 12.1 (L) 04/26/2024    HCT 37.3 (L) 04/26/2024    MCV 86.3 04/26/2024     04/26/2024     Lab Results   Component Value Date    INR 1.1 12/07/2022     Lab Results   Component Value Date    TSH 2.910 01/08/2024       I personally viewed and interpreted the patient's EKG/Telemetry/lab data    Keyon Olivas  reports that he quit smoking about 41 years ago. His smoking use included cigarettes. He started smoking about 59 years ago. He has a 18 pack-year smoking history. He has quit using smokeless tobacco.  His smokeless tobacco use included chew. I have educated him on the risk of diseases from using tobacco products such as cancer, COPD, and heart disease.     I spent 3  minutes counseling the patient.           ACP discussion was declined by the patient. Patient has an advance directive (not in EMR), copy requested.    Assessment and Plan  Diagnoses and all orders for this visit:    1. AF (paroxysmal atrial fibrillation) (Primary)    2. Coronary artery disease involving native coronary artery of native heart with " angina pectoris    3. Essential hypertension        Paroxysmal atrial fibrillation  -s/p PVA, RFA of typical AVNRT, 1/9/2024  -s/p 22 mm Amulet LAAO device, 4/29/24   -will reschedule his SHALONDA  -follow up as scheduled    Coronary artery disease  -Stable, continue Plavix, metoprolol, and Lipitor  -Follows with Dr. Samaniego's office    Hypertension  -Well controlled, continue current medication regimen    Follow-Up  Return for Next scheduled follow up.      Thank you for allowing me to participate in the care of your patient. Please to not hesitate to contact me with additional questions or concerns.     Nilesh Murdock MD [facility]  Cardiac Electrophysiologist  [facility]

## 2024-07-12 ENCOUNTER — OFFICE VISIT (OUTPATIENT)
Dept: CARDIOLOGY | Facility: CLINIC | Age: 79
End: 2024-07-12
Payer: MEDICARE

## 2024-07-12 VITALS
HEIGHT: 66 IN | BODY MASS INDEX: 29.89 KG/M2 | WEIGHT: 186 LBS | HEART RATE: 72 BPM | OXYGEN SATURATION: 98 % | SYSTOLIC BLOOD PRESSURE: 112 MMHG | DIASTOLIC BLOOD PRESSURE: 66 MMHG

## 2024-07-12 DIAGNOSIS — I48.0 AF (PAROXYSMAL ATRIAL FIBRILLATION): Primary | Chronic | ICD-10-CM

## 2024-07-12 DIAGNOSIS — I10 ESSENTIAL HYPERTENSION: Chronic | ICD-10-CM

## 2024-07-12 DIAGNOSIS — I25.119 CORONARY ARTERY DISEASE INVOLVING NATIVE CORONARY ARTERY OF NATIVE HEART WITH ANGINA PECTORIS: Chronic | ICD-10-CM

## 2024-07-29 ENCOUNTER — HOSPITAL ENCOUNTER (OUTPATIENT)
Dept: CARDIOLOGY | Facility: HOSPITAL | Age: 79
Discharge: HOME | End: 2024-07-29
Admitting: INTERNAL MEDICINE
Payer: MEDICARE

## 2024-07-29 VITALS
DIASTOLIC BLOOD PRESSURE: 73 MMHG | HEART RATE: 67 BPM | SYSTOLIC BLOOD PRESSURE: 124 MMHG | OXYGEN SATURATION: 96 % | RESPIRATION RATE: 16 BRPM

## 2024-07-29 DIAGNOSIS — I48.0 PAROXYSMAL ATRIAL FIBRILLATION: ICD-10-CM

## 2024-07-29 DIAGNOSIS — Z95.818 PRESENCE OF AMULET LEFT ATRIAL APPENDAGE CLOSURE DEVICE: ICD-10-CM

## 2024-07-29 LAB
BH CV ECHO MEAS - IVSD: 1.3 CM
LV EF 2D ECHO EST: 55 %

## 2024-07-29 PROCEDURE — 93321 DOPPLER ECHO F-UP/LMTD STD: CPT | Performed by: INTERNAL MEDICINE

## 2024-07-29 PROCEDURE — 76376 3D RENDER W/INTRP POSTPROCES: CPT | Performed by: INTERNAL MEDICINE

## 2024-07-29 PROCEDURE — 25010000002 MIDAZOLAM PER 1 MG: Performed by: INTERNAL MEDICINE

## 2024-07-29 PROCEDURE — 99152 MOD SED SAME PHYS/QHP 5/>YRS: CPT | Performed by: INTERNAL MEDICINE

## 2024-07-29 PROCEDURE — S0260 H&P FOR SURGERY: HCPCS | Performed by: NURSE PRACTITIONER

## 2024-07-29 PROCEDURE — 93312 ECHO TRANSESOPHAGEAL: CPT | Performed by: INTERNAL MEDICINE

## 2024-07-29 PROCEDURE — 99152 MOD SED SAME PHYS/QHP 5/>YRS: CPT

## 2024-07-29 PROCEDURE — 25010000002 FENTANYL CITRATE (PF) 50 MCG/ML SOLUTION: Performed by: INTERNAL MEDICINE

## 2024-07-29 PROCEDURE — 93321 DOPPLER ECHO F-UP/LMTD STD: CPT

## 2024-07-29 PROCEDURE — 93312 ECHO TRANSESOPHAGEAL: CPT

## 2024-07-29 PROCEDURE — 93325 DOPPLER ECHO COLOR FLOW MAPG: CPT

## 2024-07-29 PROCEDURE — 76376 3D RENDER W/INTRP POSTPROCES: CPT

## 2024-07-29 PROCEDURE — 93325 DOPPLER ECHO COLOR FLOW MAPG: CPT | Performed by: INTERNAL MEDICINE

## 2024-07-29 RX ORDER — FENTANYL CITRATE 50 UG/ML
INJECTION, SOLUTION INTRAMUSCULAR; INTRAVENOUS
Status: COMPLETED | OUTPATIENT
Start: 2024-07-29 | End: 2024-07-29

## 2024-07-29 RX ORDER — MIDAZOLAM HYDROCHLORIDE 1 MG/ML
INJECTION INTRAMUSCULAR; INTRAVENOUS
Status: COMPLETED | OUTPATIENT
Start: 2024-07-29 | End: 2024-07-29

## 2024-07-29 RX ADMIN — FENTANYL CITRATE 25 MCG: 50 INJECTION, SOLUTION INTRAMUSCULAR; INTRAVENOUS at 09:15

## 2024-07-29 RX ADMIN — FENTANYL CITRATE 25 MCG: 50 INJECTION, SOLUTION INTRAMUSCULAR; INTRAVENOUS at 09:10

## 2024-07-29 RX ADMIN — MIDAZOLAM 1 MG: 1 INJECTION INTRAMUSCULAR; INTRAVENOUS at 09:07

## 2024-07-29 RX ADMIN — FENTANYL CITRATE 25 MCG: 50 INJECTION, SOLUTION INTRAMUSCULAR; INTRAVENOUS at 09:07

## 2024-07-29 RX ADMIN — MIDAZOLAM 1 MG: 1 INJECTION INTRAMUSCULAR; INTRAVENOUS at 09:10

## 2024-07-29 RX ADMIN — MIDAZOLAM 1 MG: 1 INJECTION INTRAMUSCULAR; INTRAVENOUS at 09:15

## 2024-07-29 RX ADMIN — METHOHEXITAL SODIUM 20 MG: 500 INJECTION, POWDER, LYOPHILIZED, FOR SOLUTION INTRAMUSCULAR; INTRAVENOUS; RECTAL at 09:18

## 2024-07-29 NOTE — H&P
Keyon Olivas  [id number]  1945   LOS: 0 days   Patient Care Team:  Osman Olivas MD as PCP - General  Osman Olivas MD as Referring Physician (Family Medicine)  Christianne Ga APRN as Nurse Practitioner (Cardiology)  Juan C Samaniego MD as Consulting Physician (Cardiology)  Wesley Cash MD as Consulting Physician (Gastroenterology)    Mr. Olivas is a 78-year-old  white male from [place], [place].    Chief Complaint: 3-month SHALONDA status post Amulet LAAO    Problem List:  Paroxysmal atrial fibrillation  [id number] 4, anticoagulated Eliquis  Brief episode 8/2022, anticoagulation was not started  Monitor, showed atrial fibrillation started on Eliquis by Dr. Samaniego (data deficit)  Monitor, 9/2023 NSR with SVT and ST  PVA, RFA of typical AVNRT, 1/9/2024, by Dr. Murdock  22 mm Amulet LAAO device, 4/29/24 by Dr. Murdock  Coronary artery disease  Coronary artery bypass grafting x3, 5/2000.  By report, the patient was grafted with LIMA to LAD, left radial artery to the RCA, and left radial artery as a T graft from  internal mammary artery to the OM 2.   Reported stenting to the RCA and OM 2, 8/2006   Repeat catheterization, 8/2022.  The patient demonstrated patent LIMA to LAD but with diffuse disease distally.  The free radial artery to the RCA was patent but felt to be very small.  No grafting of the OM was noted.  The patient had diffuse disease to the distal LAD, RCA, and circumflex.  Eventually, he did have angioplasty only to the OM.    Repeat catheterization, 3/2023, with stenting of the left main and of the first OM.  Anatomy was stable to that mentioned above   HFpEF  Echo, 2/2020: EF 50%  Echo, 7/2022: EF 50 to 60%  Echo, 8/2022: EF 50%, aortic valve is calcified with trace to mild aortic insufficiency no significant stenosis   SHALONDA, 5/29/24: EF 45-50%, grade 2 diastolic dysfunction, moderate biatrial enlargement, pulmonary HTN  Hypertension  Dyslipidemia  Ulcerative colitis, with  ileostomy  History of GI bleed  Nonobstructive carotid artery disease, 2022    Allergies   Allergen Reactions    Crestor [Rosuvastatin] Myalgia    Isosorbide Dinitrate Other (See Comments) and Headache     Weakness, fatigue    Lescol [Fluvastatin Sodium] Myalgia    Lipitor [Atorvastatin] Myalgia    Niacin And Related Rash    Penicillins Rash     (Not in a hospital admission)    Scheduled Meds:  Continuous Infusions:No current facility-administered medications for this encounter.    PRN Meds:.       History of Present Illness:   The patient is here for a 3-month SHALONDA status post Amulet LAAO.  He says he was supposed to be back a couple weeks ago but was very sick at that time and had to reschedule.  Patient also had a PVA, RFA of typical AVNRT in 2024.  The patient denies any chest pain, shortness of breath, palpitations, dizziness, presyncope, or syncope.  He has an upper partial.  He has been taking half of his valsartan.  He has an upcoming appointment with Dr. Samanieog next month.  Awaiting son to arrive so patient can have procedure/.  Patient understands that he cannot drive for 24 hours after sedation.      Cardiac risk factors: advanced age (older than 55 for men, 65 for women), dyslipidemia, hypertension, and male gender.    Social History     Socioeconomic History    Marital status:    Tobacco Use    Smoking status: Former     Current packs/day: 0.00     Average packs/day: 1 pack/day for 18.0 years (18.0 ttl pk-yrs)     Types: Cigarettes     Start date: 1965     Quit date: 1983     Years since quittin.6    Smokeless tobacco: Former     Types: Chew    Tobacco comments:     smoked 10-20 years, < last 1 PPD   Vaping Use    Vaping status: Never Used   Substance and Sexual Activity    Alcohol use: No    Drug use: No    Sexual activity: Defer     Family History   Problem Relation Age of Onset    Emphysema Mother     Heart failure Mother     Heart attack Father     Memory loss  Brother        Review of Systems  10 point review of systems was completed, positives outlined in the HPI, and otherwise all other systems are negative.      Objective:       Physical Exam  /72   Pulse 73   Resp 16   SpO2 99%   There were no vitals filed for this visit.  There is no height or weight on file to calculate BMI.  No intake or output data in the 24 hours ending 07/29/24 0831    General Appearance:  Alert, cooperative, no distress, appears stated age   Head:  Normocephalic, without obvious abnormality, atraumatic   Neck: Supple, symmetrical, trachea midline, no adenopathy, thyroid: not enlarged, symmetric, no tenderness/mass/nodules, no carotid bruit or JVD   Lungs:   Clear to auscultation bilaterally, respirations unlabored   Heart:  Regular rate and rhythm, S1, S2 normal, no murmur, rub or gallop   Abdomen:   Soft, nontender, no masses, no organomegaly, bowel sounds audible x4   Extremities: No edema, normal range of motion   Pulses: 2+ and symmetric   Skin: Skin color, texture, turgor normal, no rashes or lesions   Neurologic: Normal       Cardiographics  EKG: Sinus rhythm on telemetry    Imaging  Chest x-ray: No new chest x-ray to review    Lab Review                           Assessment:    The patient is here for a 3-month SHALONDA status post Amulet LAAO; procedure, risks, and complications discussed with the patient and he is agreeable to proceed.          Plan:   SHAOLNDA  Follow-up with Dr. Samaniego for previously scheduled appointment on 8/20/2024  Follow-up with Dr. Murdock for previously scheduled appointment on 2/14/2025         Electronically signed by LISSET Tam, 07/29/24, 8:31 AM EDT.

## 2024-08-20 ENCOUNTER — OFFICE VISIT (OUTPATIENT)
Dept: CARDIOLOGY | Facility: CLINIC | Age: 79
End: 2024-08-20
Payer: MEDICARE

## 2024-08-20 VITALS
DIASTOLIC BLOOD PRESSURE: 67 MMHG | HEIGHT: 66 IN | OXYGEN SATURATION: 97 % | WEIGHT: 189.2 LBS | BODY MASS INDEX: 30.41 KG/M2 | SYSTOLIC BLOOD PRESSURE: 118 MMHG | HEART RATE: 83 BPM

## 2024-08-20 DIAGNOSIS — I48.0 AF (PAROXYSMAL ATRIAL FIBRILLATION): Primary | ICD-10-CM

## 2024-08-20 DIAGNOSIS — I10 ESSENTIAL HYPERTENSION: ICD-10-CM

## 2024-08-20 DIAGNOSIS — I25.119 CORONARY ARTERY DISEASE INVOLVING NATIVE CORONARY ARTERY OF NATIVE HEART WITH ANGINA PECTORIS: ICD-10-CM

## 2024-08-20 DIAGNOSIS — I65.23 BILATERAL CAROTID ARTERY STENOSIS: ICD-10-CM

## 2024-08-20 DIAGNOSIS — E78.5 HYPERLIPIDEMIA LDL GOAL <70: ICD-10-CM

## 2024-08-20 DIAGNOSIS — G47.33 OSA (OBSTRUCTIVE SLEEP APNEA): ICD-10-CM

## 2024-08-20 PROBLEM — I48.91 AF (ATRIAL FIBRILLATION): Status: RESOLVED | Noted: 2024-01-09 | Resolved: 2024-08-20

## 2024-08-20 PROBLEM — I48.91 ATRIAL FIBRILLATION: Status: RESOLVED | Noted: 2022-08-31 | Resolved: 2024-08-20

## 2024-08-20 PROCEDURE — 99214 OFFICE O/P EST MOD 30 MIN: CPT | Performed by: INTERNAL MEDICINE

## 2024-08-20 PROCEDURE — 3078F DIAST BP <80 MM HG: CPT | Performed by: INTERNAL MEDICINE

## 2024-08-20 PROCEDURE — 3074F SYST BP LT 130 MM HG: CPT | Performed by: INTERNAL MEDICINE

## 2024-08-20 RX ORDER — VALSARTAN 40 MG/1
20 TABLET ORAL DAILY
COMMUNITY

## 2024-08-20 NOTE — PROGRESS NOTES
Subjective   Keyon Olivas is a 78 y.o. male     Chief Complaint   Patient presents with    Follow-up     Here for yearly f/u    Coronary Artery Disease    Hyperlipidemia    Hypertension    Atrial Fibrillation    Carotid Artery Disease    Sleep Apnea       PROBLEM LIST:     1.  Coronary artery disease.  1.1.  Coronary artery bypass grafting x3, 5/2000.  By report, the patient was grafted with LIMA to LAD, left radial artery to the RCA, and left radial artery as a T graft from  internal mammary artery to the OM 2.  1.2.  Reported stenting to the RCA and OM 2, 8/2006.  1.3.  Repeat catheterization, 8/2022.  The patient demonstrated patent LIMA to LAD but with diffuse disease distally.  The free radial artery to the RCA was patent but felt to be very small.  No grafting of the OM was noted.  The patient had diffuse disease to the distal LAD, RCA, and circumflex.  Eventually, he did have angioplasty only to the OM.  1.4.  Repeat catheterization, 3/2023, with stenting of the left main and of the first OM.  Anatomy was stable to that mentioned above.  2.  Low normal, but preserved systolic function, estimated EF at 50% by recent echo.  3.  Hypertension  4.  Dyslipidemia.  5.  Ulcerative colitis, apparently with ileostomy, remote.  6.  Nonobstructive carotid artery disease per duplex, 8/2022.  7.PAF, brief episode of RVR per 8- [facility] admit. Never started on anti-coag.   8.  Alert received on event monitor indicating episode of paroxysmal atrial fibrillation.  Reviewed by Dr. Samaniego and patient started on Eliquis for long-term anticoagulation  9. 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 Murdock   10. 22 mm Amulet LAAO device, 4/29/24 by Dr. Murdock     Specialty Problems          Cardiology Problems    Coronary artery disease involving native coronary artery of native heart with angina pectoris        Essential hypertension        Hyperlipidemia LDL goal <70        Bilateral carotid artery stenosis        Atrial  "fibrillation        AF (atrial fibrillation)        AF (paroxysmal atrial fibrillation)             HPI:    Mr. Olivas returns for follow-up on the above.    He states he is felt better over the past several months than he has in \"quite some time\".  He feels he has strength, better stamina, and less exertional dyspnea.  He continues to be without chest pain, orthopnea, PND, or lower extremity edema.  He senses no palpitations.  He describes mild orthostatic lightheadedness but no presyncope or syncope.  He has had no symptoms to suggest TIA or stroke    Blood pressures have been well-controlled.  We have no recent fasting lipid profile data.  Mr Arias has had no intercurrent illnesses, injuries, or hospitalizations.                    PRIOR MEDICATIONS    Current Outpatient Medications on File Prior to Visit   Medication Sig Dispense Refill    aspirin 81 MG EC tablet Take 1 tablet by mouth Daily. 90 tablet 1    atorvastatin (LIPITOR) 80 MG tablet Take 1 tablet by mouth Every Night.      Cholecalciferol 25 MCG (1000 UT) tablet Take 1 tablet by mouth Daily.      clopidogrel (PLAVIX) 75 MG tablet Take 1 tablet by mouth Daily. 90 tablet 3    ezetimibe (ZETIA) 10 MG tablet Take 1 tablet by mouth Daily. 30 tablet 11    loperamide (IMODIUM) 2 MG capsule Take 1 capsule by mouth 2 (Two) Times a Day. (Patient taking differently: Take 1 capsule by mouth As Needed.) 30 capsule     metoprolol tartrate (LOPRESSOR) 25 MG tablet Take 1 tablet by mouth 2 (Two) Times a Day. (Patient taking differently: Take 0.5 tablets by mouth 2 (Two) Times a Day.) 180 tablet 3    tamsulosin (FLOMAX) 0.4 MG capsule 24 hr capsule Take 1 capsule by mouth Every Night.      traMADol (ULTRAM) 50 MG tablet Take 1 tablet by mouth Every 6 (Six) Hours As Needed for Moderate Pain. prn      valsartan (DIOVAN) 40 MG tablet Take 0.5 tablets by mouth Daily.      venlafaxine (EFFEXOR) 75 MG tablet Take 1 tablet by mouth 2 (Two) Times a Day.      vitamin B-12 " (CYANOCOBALAMIN) 1000 MCG tablet Take 1 tablet by mouth Daily.      zolpidem (AMBIEN) 10 MG tablet Take 1 tablet by mouth At Night As Needed.      nitroglycerin (NITROSTAT) 0.4 MG SL tablet Place 1 tablet under the tongue Every 5 (Five) Minutes As Needed for Chest Pain. Take no more than 3 doses in 15 minutes. (Patient not taking: Reported on 8/20/2024) 35 tablet 5     No current facility-administered medications on file prior to visit.       ALLERGIES:    Crestor [rosuvastatin], Isosorbide dinitrate, Lescol [fluvastatin sodium], Lipitor [atorvastatin], Niacin and related, and Penicillins    PAST MEDICAL HISTORY:    Past Medical History:   Diagnosis Date    ANNA MARIE (acute kidney injury) 08/31/2022    Anxiety and depression 03/09/2017    Atrial fibrillation 08/31/2022    Bilateral carotid artery stenosis 08/23/2022    BPH (benign prostatic hyperplasia) 03/09/2017    CAD (coronary artery disease) 03/09/2017    COVID-19     02/2022    COVID-19 vaccine administered     02/26/2021, 03/26/2021, 03/10/2022 - Moderna    DJD (degenerative joint disease) 03/09/2017    Dyslipidemia 03/09/2017    Elevated cholesterol     Hepatitis C     History of hepatitis C; followed by Dr. Cash in [place].    History of transfusion [address] [place] [address], NO REACTION; PT STATED HE CONTRACTED HEP C    Hyperlipidemia     Hypertension 03/09/2017    Insomnia 03/09/2017    Neuropathy     DEACON (obstructive sleep apnea) 03/09/2017    NOT USING CPAP    Poor short term memory 03/09/2017    Small fiber neuropathy     Ulcerative colitis 03/09/2017       SURGICAL HISTORY:    Past Surgical History:   Procedure Laterality Date    ATRIAL APPENDAGE EXCLUSION LEFT WITH TRANSESOPHAGEAL ECHOCARDIOGRAM N/A 4/29/2024    Procedure: Atrial Appendage Occlusion;  Surgeon: Nilesh Murdock MD;  Location: [facility] INVASIVE LOCATION;  Service: Cardiovascular;  Laterality: N/A;    CARDIAC CATHETERIZATION  05/2000    CARDIAC CATHETERIZATION  2023    PCI    CARDIAC  ELECTROPHYSIOLOGY PROCEDURE N/A 2024    Procedure: PVA (paroxysmal), Carto, hold Eliquis morning of;  Surgeon: Nilesh Murdock MD;  Location: [facility] INVASIVE LOCATION;  Service: Cardiovascular;  Laterality: N/A;    COLECTOMY TOTAL      Ulcerative colitis, status post total colectomy with ileostomy.     COLONOSCOPY      CORONARY ARTERY BYPASS GRAFT  05/11/2000    x3    CORONARY STENT PLACEMENT  2006    ENDOSCOPY      MUSCLE FLAP      ON BOTTOM    PILONIDAL CYSTECTOMY         SOCIAL HISTORY:    Social History     Socioeconomic History    Marital status:    Tobacco Use    Smoking status: Former     Current packs/day: 0.00     Average packs/day: 1 pack/day for 18.0 years (18.0 ttl pk-yrs)     Types: Cigarettes     Start date: 1965     Quit date: 1983     Years since quittin.6    Smokeless tobacco: Former     Types: Chew    Tobacco comments:     smoked 10-20 years, < last 1 PPD   Vaping Use    Vaping status: Never Used   Substance and Sexual Activity    Alcohol use: No    Drug use: No    Sexual activity: Defer       FAMILY HISTORY:    Family History   Problem Relation Age of Onset    Emphysema Mother     Heart failure Mother     Heart attack Father     Memory loss Brother        Review of Systems   Constitutional: Negative.    HENT:  Positive for tinnitus.    Eyes:  Positive for visual disturbance (glasses prn).   Respiratory:  Positive for shortness of breath (easily).    Cardiovascular: Negative.    Gastrointestinal: Negative.         Iliestomy in place   Endocrine: Negative.    Genitourinary: Negative.    Musculoskeletal:  Positive for arthralgias and myalgias.   Skin: Negative.    Allergic/Immunologic: Negative.    Neurological:  Positive for dizziness and light-headedness. Negative for syncope.   Hematological: Negative.    Psychiatric/Behavioral:  Positive for sleep disturbance.        VISIT VITALS:  Vitals:    24 1350   BP: 118/67   BP Location: Left arm   Patient Position:  "Sitting   Pulse: 83   SpO2: 97%   Weight: 85.8 kg (189 lb 3.2 oz)   Height: 167.6 cm (65.98\")      /67 (BP Location: Left arm, Patient Position: Sitting)   Pulse 83   Ht 167.6 cm (65.98\")   Wt 85.8 kg (189 lb 3.2 oz)   SpO2 97%   BMI 30.55 kg/m²     RECENT LABS:    Objective       Physical Exam  Vitals and nursing note reviewed.   Constitutional:       General: He is not in acute distress.     Appearance: He is well-developed.   HENT:      Head: Normocephalic and atraumatic.   Eyes:      Conjunctiva/sclera: Conjunctivae normal.      Pupils: Pupils are equal, round, and reactive to light.   Neck:      Vascular: No carotid bruit, hepatojugular reflux or JVD.      Trachea: No tracheal deviation.      Comments: Nl. Carotid upstrokes  Cardiovascular:      Rate and Rhythm: Normal rate and regular rhythm.      Pulses:           Radial pulses are 2+ on the right side.      Heart sounds: Normal heart sounds, S1 normal and S2 normal. No murmur heard.     No friction rub. S4 sounds present. No S3 sounds.      Comments: No palpable lt. Radial,  used during CABG    Pulmonary:      Effort: Pulmonary effort is normal.      Breath sounds: Normal breath sounds. No wheezing, rhonchi or rales.      Comments: Nl. Expir. Phase  Nl. Breath sound intensity    Abdominal:      General: Bowel sounds are normal. There is no distension or abdominal bruit.      Palpations: Abdomen is soft. There is no mass.      Tenderness: There is no abdominal tenderness. There is no guarding or rebound.      Comments: No organomegaly   Musculoskeletal:         General: No tenderness or deformity. Normal range of motion.      Cervical back: Normal range of motion and neck supple.      Right lower leg: No edema.      Left lower leg: No edema.      Comments: LLE, Sock indention, palpable PT pedal pulse  RLE, Sock indention, palpable pedal pulses   Skin:     General: Skin is warm and dry.      Coloration: Skin is not pale.      Findings: No erythema " or rash.   Neurological:      Mental Status: He is alert and oriented to person, place, and time.   Psychiatric:         Behavior: Behavior normal.         Thought Content: Thought content normal.         Judgment: Judgment normal.         Procedures      Assessment & Plan   #1.  Coronary artery disease.  This patient is status post bypass grafting in the distant past with interim stenting and PTCA.  He is now asymptomatic of ischemia at moderate levels of physical activity and he has improved functional capacity.  No further testing is indicated at this time we will continue to follow clinically.    2.  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  Mr Arias also underwent percutaneous left atrial appendage ablation.  He is asymptomatic regarding conduction system abnormalities.  We will continue his present.    3.  Controlled systemic hypertension.    4.  Treated dyslipidemia.  The patient is on high-dose statin.  We will recheck fasting lipid profile and liver enzymes.    5.  Mr. Olivas will follow with Dr. Olivas as instructed, with   Diagnosis Plan   1. AF (paroxysmal atrial fibrillation)        2. Bilateral carotid artery stenosis        3. Coronary artery disease involving native coronary artery of native heart with angina pectoris        4. Essential hypertension        5. Hyperlipidemia LDL goal <70        6. DEACON (obstructive sleep apnea)            No follow-ups on file.         Keyon Olivas  reports that he quit smoking about 41 years ago. His smoking use included cigarettes. He started smoking about 59 years ago. He has a 18 pack-year smoking history. He has quit using smokeless tobacco.  His smokeless tobacco use included chew. I have educated him on the risk of diseases from using tobacco products such as cancer, COPD, and heart disease.       Advance Care Planning   ACP discussion was held with the patient during this  visit. Patient has an advance directive (not in EMR), copy requested.            BMI is >= 30 and <35. (Class 1 Obesity). The following options were offered after discussion;: pcp addressing               Electronically signed by:    Scribed for Juan C Samaniego MD by Greer Grullon LPN on August 20, 2024  at 14:27 EDT    I, Juan C Samaniego MD personally performed the services described in this documentation as scribed by the above named individual in my presence, and it is both accurate and complete. August 20, 2024 14:27 EDT      Dictated Utilizing Dragon Dictation: Part of this note may be an electronic transcription/translation of spoken language to printed text using the Dragon Dictation System.

## 2024-08-28 DIAGNOSIS — I25.810 CORONARY ARTERY DISEASE INVOLVING CORONARY BYPASS GRAFT OF NATIVE HEART WITHOUT ANGINA PECTORIS: ICD-10-CM

## 2024-08-28 DIAGNOSIS — I10 PRIMARY HYPERTENSION: ICD-10-CM

## 2024-08-28 RX ORDER — METOPROLOL TARTRATE 25 MG/1
25 TABLET, FILM COATED ORAL 2 TIMES DAILY
Qty: 180 TABLET | Refills: 3 | Status: SHIPPED | OUTPATIENT
Start: 2024-08-28

## 2024-08-28 RX ORDER — VALSARTAN 40 MG/1
20 TABLET ORAL DAILY
Qty: 45 TABLET | Refills: 3 | Status: SHIPPED | OUTPATIENT
Start: 2024-08-28

## 2024-11-08 ENCOUNTER — TELEMEDICINE (OUTPATIENT)
Dept: CARDIOLOGY | Facility: HOSPITAL | Age: 79
End: 2024-11-08
Payer: MEDICARE

## 2024-11-08 DIAGNOSIS — Z95.818 PRESENCE OF AMULET LEFT ATRIAL APPENDAGE CLOSURE DEVICE: ICD-10-CM

## 2024-11-08 DIAGNOSIS — I48.0 AF (PAROXYSMAL ATRIAL FIBRILLATION): Primary | ICD-10-CM

## 2024-11-08 RX ORDER — ASPIRIN 81 MG/1
81 TABLET, CHEWABLE ORAL DAILY
COMMUNITY

## 2024-11-08 NOTE — PATIENT INSTRUCTIONS
- Continue Aspirin 81mg daily.   Discontinue clopidogrel    - Office will call to schedule 1 year final esophageal echocardiogram to check Amulet device.

## 2024-11-08 NOTE — PROGRESS NOTES
6 Month Amulet Follow-up Note    Patient presents today for follow-up s/p Amulet placement by Dr. Murdock on 4/29/24.  Patient has completed follow-up SHALONDA that revealed a well-positioned Amulet device with no significant wesly-prosthetic leak present. Patient has continued DAPT until this point.      Instructed to discontinue clopidogrel (verifies no other cardiac implants in the past year), continue ASA 81mg daily as monotherapy. Discussed 1 year Amulet follow-up with planned SHALONDA around 1 year anniversary date of implant.  1 year SHALONDA ordered per LAAO protocol.  Patient receptive of plan, appreciative of call.

## 2024-11-27 ENCOUNTER — TELEPHONE (OUTPATIENT)
Dept: CARDIOLOGY | Facility: CLINIC | Age: 79
End: 2024-11-27
Payer: MEDICARE

## 2025-01-02 ENCOUNTER — LAB (OUTPATIENT)
Dept: LAB | Facility: HOSPITAL | Age: 80
End: 2025-01-02
Payer: MEDICARE

## 2025-01-02 DIAGNOSIS — E78.5 HYPERLIPIDEMIA LDL GOAL <70: ICD-10-CM

## 2025-01-02 DIAGNOSIS — I65.23 BILATERAL CAROTID ARTERY STENOSIS: ICD-10-CM

## 2025-01-02 DIAGNOSIS — I25.119 CORONARY ARTERY DISEASE INVOLVING NATIVE CORONARY ARTERY OF NATIVE HEART WITH ANGINA PECTORIS: ICD-10-CM

## 2025-01-02 LAB
ALBUMIN SERPL-MCNC: 4.3 G/DL (ref 3.5–5.2)
ALP SERPL-CCNC: 120 U/L (ref 39–117)
ALT SERPL W P-5'-P-CCNC: 32 U/L (ref 1–41)
AST SERPL-CCNC: 29 U/L (ref 1–40)
BILIRUB CONJ SERPL-MCNC: 0.2 MG/DL (ref 0–0.3)
BILIRUB INDIRECT SERPL-MCNC: 0.6 MG/DL
BILIRUB SERPL-MCNC: 0.8 MG/DL (ref 0–1.2)
CHOLEST SERPL-MCNC: 107 MG/DL (ref 0–200)
HDLC SERPL-MCNC: 41 MG/DL (ref 40–60)
LDLC SERPL CALC-MCNC: 43 MG/DL (ref 0–100)
LDLC/HDLC SERPL: 0.99 {RATIO}
PROT SERPL-MCNC: 7.6 G/DL (ref 6–8.5)
TRIGL SERPL-MCNC: 128 MG/DL (ref 0–150)
VLDLC SERPL-MCNC: 23 MG/DL (ref 5–40)

## 2025-01-02 PROCEDURE — 36415 COLL VENOUS BLD VENIPUNCTURE: CPT

## 2025-01-02 PROCEDURE — 80076 HEPATIC FUNCTION PANEL: CPT

## 2025-01-02 PROCEDURE — 80061 LIPID PANEL: CPT

## 2025-01-15 ENCOUNTER — TELEPHONE (OUTPATIENT)
Dept: CARDIOLOGY | Facility: CLINIC | Age: 80
End: 2025-01-15
Payer: MEDICARE

## 2025-01-15 DIAGNOSIS — E78.5 HYPERLIPIDEMIA LDL GOAL <70: ICD-10-CM

## 2025-01-15 DIAGNOSIS — I25.810 CORONARY ARTERY DISEASE INVOLVING CORONARY BYPASS GRAFT OF NATIVE HEART WITHOUT ANGINA PECTORIS: Primary | ICD-10-CM

## 2025-01-15 RX ORDER — ATORVASTATIN CALCIUM 40 MG/1
40 TABLET, FILM COATED ORAL NIGHTLY
Qty: 30 TABLET | Refills: 11 | Status: SHIPPED | OUTPATIENT
Start: 2025-01-15

## 2025-01-15 NOTE — TELEPHONE ENCOUNTER
CHOL. RESULTS BRIEFLY DISCUSSED AND AWARE TO DECREASE LIPITOR TO 40 MG AND WILL COME TO LAB DOWNSTAIRS FOR FASTING LABS IN 8 WEEKS. VERBALIZED HE UNDERSTOOD. CHACHA,LPN        ----- Message from Juan C Samaniego sent at 1/14/2025 12:58 PM EST -----  Decrease lipitor to 40 mg daily with fasting lipid / hepatic in 8 weeks  ----- Message -----  From: Lab, Background User  Sent: 1/2/2025   6:31 PM EST  To: Juan C Samaniego MD

## 2025-04-17 ENCOUNTER — LAB (OUTPATIENT)
Dept: LAB | Facility: HOSPITAL | Age: 80
End: 2025-04-17
Payer: MEDICARE

## 2025-04-17 DIAGNOSIS — I25.810 CORONARY ARTERY DISEASE INVOLVING CORONARY BYPASS GRAFT OF NATIVE HEART WITHOUT ANGINA PECTORIS: ICD-10-CM

## 2025-04-17 DIAGNOSIS — E78.5 HYPERLIPIDEMIA LDL GOAL <70: ICD-10-CM

## 2025-04-17 LAB
ALBUMIN SERPL-MCNC: 4.2 G/DL (ref 3.5–5.2)
ALP SERPL-CCNC: 92 U/L (ref 39–117)
ALT SERPL W P-5'-P-CCNC: 29 U/L (ref 1–41)
AST SERPL-CCNC: 30 U/L (ref 1–40)
BILIRUB CONJ SERPL-MCNC: 0.2 MG/DL (ref 0–0.3)
BILIRUB INDIRECT SERPL-MCNC: 0.4 MG/DL
BILIRUB SERPL-MCNC: 0.6 MG/DL (ref 0–1.2)
CHOLEST SERPL-MCNC: 112 MG/DL (ref 0–200)
HDLC SERPL-MCNC: 45 MG/DL (ref 40–60)
LDLC SERPL CALC-MCNC: 43 MG/DL (ref 0–100)
LDLC/HDLC SERPL: 0.89 {RATIO}
PROT SERPL-MCNC: 7.5 G/DL (ref 6–8.5)
TRIGL SERPL-MCNC: 135 MG/DL (ref 0–150)
VLDLC SERPL-MCNC: 24 MG/DL (ref 5–40)

## 2025-04-17 PROCEDURE — 80061 LIPID PANEL: CPT

## 2025-04-17 PROCEDURE — 80076 HEPATIC FUNCTION PANEL: CPT

## 2025-04-17 PROCEDURE — 36415 COLL VENOUS BLD VENIPUNCTURE: CPT

## 2025-05-27 ENCOUNTER — HOSPITAL ENCOUNTER (OUTPATIENT)
Dept: CARDIOLOGY | Facility: HOSPITAL | Age: 80
Discharge: HOME | End: 2025-05-27
Payer: MEDICARE

## 2025-05-30 ENCOUNTER — TELEPHONE (OUTPATIENT)
Dept: CARDIOLOGY | Facility: CLINIC | Age: 80
End: 2025-05-30
Payer: MEDICARE

## 2025-05-30 NOTE — TELEPHONE ENCOUNTER
LVM to check in on pt for 1 year Amulet SHALONDA and see how he was doing. Asked he return call to [phone].  Sandra Ding RN

## 2025-08-26 ENCOUNTER — OFFICE VISIT (OUTPATIENT)
Dept: CARDIOLOGY | Facility: CLINIC | Age: 80
End: 2025-08-26
Payer: MEDICARE

## 2025-08-26 VITALS
HEIGHT: 66 IN | SYSTOLIC BLOOD PRESSURE: 119 MMHG | WEIGHT: 192.8 LBS | OXYGEN SATURATION: 97 % | BODY MASS INDEX: 30.98 KG/M2 | DIASTOLIC BLOOD PRESSURE: 70 MMHG | HEART RATE: 80 BPM

## 2025-08-26 DIAGNOSIS — I48.0 AF (PAROXYSMAL ATRIAL FIBRILLATION): ICD-10-CM

## 2025-08-26 DIAGNOSIS — E78.5 HYPERLIPIDEMIA LDL GOAL <70: Primary | ICD-10-CM

## 2025-08-26 DIAGNOSIS — G47.33 OSA (OBSTRUCTIVE SLEEP APNEA): ICD-10-CM

## 2025-08-26 DIAGNOSIS — I10 ESSENTIAL HYPERTENSION: ICD-10-CM

## 2025-08-26 DIAGNOSIS — I65.23 BILATERAL CAROTID ARTERY STENOSIS: ICD-10-CM

## 2025-08-26 DIAGNOSIS — I25.119 CORONARY ARTERY DISEASE INVOLVING NATIVE CORONARY ARTERY OF NATIVE HEART WITH ANGINA PECTORIS: ICD-10-CM

## 2025-08-26 PROCEDURE — 93000 ELECTROCARDIOGRAM COMPLETE: CPT | Performed by: INTERNAL MEDICINE

## 2025-08-26 PROCEDURE — 3078F DIAST BP <80 MM HG: CPT | Performed by: INTERNAL MEDICINE

## 2025-08-26 PROCEDURE — 3074F SYST BP LT 130 MM HG: CPT | Performed by: INTERNAL MEDICINE

## 2025-08-26 PROCEDURE — 99214 OFFICE O/P EST MOD 30 MIN: CPT | Performed by: INTERNAL MEDICINE

## 2025-08-26 RX ORDER — GABAPENTIN 100 MG/1
100 CAPSULE ORAL 4 TIMES DAILY PRN
COMMUNITY
Start: 2024-10-28

## (undated) DEVICE — PERCLOSE™ PROSTYLE™ SUTURE-MEDIATED CLOSURE AND REPAIR SYSTEM: Brand: PERCLOSE™ PROSTYLE™

## (undated) DEVICE — Device: Brand: SMARTABLATE

## (undated) DEVICE — PINNACLE INTRODUCER SHEATH: Brand: PINNACLE

## (undated) DEVICE — ADULT, W/LG. BACK PAD, RADIOTRANSPARENT ELEMENT AND LEAD WIRE COMPATIBLE W/: Brand: DEFIBRILLATION ELECTRODES

## (undated) DEVICE — INTRO STEER AGILIS NXT SM/CURL 8.5F

## (undated) DEVICE — Device: Brand: MEDEX

## (undated) DEVICE — Device: Brand: SOUNDSTAR

## (undated) DEVICE — SET PRIMARY GRVTY 10DP MALE LL 104IN

## (undated) DEVICE — INTRO SHEATH FAST/CATH LG/LUM 11F .038IN 12CM

## (undated) DEVICE — KT MANIFLD EP

## (undated) DEVICE — DRSNG SURESITE123 4X4.8IN

## (undated) DEVICE — GW DIAG .032

## (undated) DEVICE — DRSNG PRESS SAFEGUARD

## (undated) DEVICE — CATH DIAG EXPO .052 PIG145 6F 110CM

## (undated) DEVICE — 1 X VERSACROSS TRANSSEPTAL SHEATH (INCLUDING  1 X J-TIP GUIDEWIRE); 1 X VERSACROSS RF WIRE (INCLUDING 1 X CONNECTOR CABLE (SINGLE USE)); 1 X DISPERSIVE ELECTRODE: Brand: VERSACROSS ACCESS SOLUTION

## (undated) DEVICE — LEX ELECTRO PHYSIOLOGY: Brand: MEDLINE INDUSTRIES, INC.

## (undated) DEVICE — ST INF PRI SMRTSTE 20DRP 2VLV 24ML 117

## (undated) DEVICE — STPCK 3/WY HP M/RA W/OFF/HNDL 1050PSI STRL

## (undated) DEVICE — BI-DIRECTIONAL NAVIGATION CATHETER, NAV, F-J: Brand: QDOT MICRO

## (undated) DEVICE — Device: Brand: WEBSTER CS

## (undated) DEVICE — Device: Brand: REFERENCE PATCH CARTO 3

## (undated) DEVICE — SOL NACL 0.9PCT 1000ML

## (undated) DEVICE — STERILE (15.2 TAPERED TO 7.6 X 183CM) POLYETHYLENE ACCORDION-FOLDED COVER FOR USE WITH SIEMENS ACUNAV ULTRASOUND CATHETER FAMILY CONNECTOR: Brand: SWIFTLINK TRANSDUCER COVER

## (undated) DEVICE — PAD GRND REM POLYHESIVE A/ DISP

## (undated) DEVICE — ST EXT IV SMRTSTE 2VLV FIX M LL 6ML 41

## (undated) DEVICE — COPILOT BLEEDBACK CONTROL VALVE: Brand: COPILOT

## (undated) DEVICE — CATH ULTRASND ECHOCARDIOGRAPHY ACUNAV

## (undated) DEVICE — TBG SXN ESOPH ENSOETM FOR/BLANETROL/1/2

## (undated) DEVICE — DECANT BG O JET

## (undated) DEVICE — PK CATH CARD 10

## (undated) DEVICE — INTRO SHEATH ENGAGE W/50 GW .038 8F12

## (undated) DEVICE — TBG PRESS MON 48IN M/F L/L: Brand: MEDLINE INDUSTRIES, INC.

## (undated) DEVICE — ELECTRD RETRN/GRND MEGADYNE SGL/PLT W/CORD 9FT DISP

## (undated) DEVICE — OCTA,GALAXY,3-3-3-3-3,F-CURVE: Brand: OCTARAY MAPPING CATHETER